# Patient Record
Sex: MALE | Race: BLACK OR AFRICAN AMERICAN | Employment: UNEMPLOYED | URBAN - METROPOLITAN AREA
[De-identification: names, ages, dates, MRNs, and addresses within clinical notes are randomized per-mention and may not be internally consistent; named-entity substitution may affect disease eponyms.]

---

## 2017-10-13 ENCOUNTER — APPOINTMENT (OUTPATIENT)
Dept: CT IMAGING | Age: 47
End: 2017-10-13
Attending: EMERGENCY MEDICINE
Payer: MEDICAID

## 2017-10-13 ENCOUNTER — APPOINTMENT (OUTPATIENT)
Dept: GENERAL RADIOLOGY | Age: 47
End: 2017-10-13
Attending: EMERGENCY MEDICINE
Payer: MEDICAID

## 2017-10-13 ENCOUNTER — HOSPITAL ENCOUNTER (EMERGENCY)
Age: 47
Discharge: HOME | End: 2017-10-13
Attending: EMERGENCY MEDICINE
Payer: MEDICAID

## 2017-10-13 VITALS
RESPIRATION RATE: 9 BRPM | TEMPERATURE: 98.2 F | DIASTOLIC BLOOD PRESSURE: 69 MMHG | WEIGHT: 258 LBS | BODY MASS INDEX: 36.12 KG/M2 | OXYGEN SATURATION: 98 % | HEIGHT: 71 IN | SYSTOLIC BLOOD PRESSURE: 123 MMHG | HEART RATE: 66 BPM

## 2017-10-13 DIAGNOSIS — J18.9 PNEUMONIA OF RIGHT LUNG DUE TO INFECTIOUS ORGANISM, UNSPECIFIED PART OF LUNG: Primary | ICD-10-CM

## 2017-10-13 LAB
ANION GAP SERPL CALC-SCNC: 8 MMOL/L (ref 3–18)
ATRIAL RATE: 56 BPM
ATRIAL RATE: 74 BPM
BASOPHILS # BLD: 0 K/UL (ref 0–0.06)
BASOPHILS NFR BLD: 0 % (ref 0–2)
BUN SERPL-MCNC: 14 MG/DL (ref 7–18)
BUN/CREAT SERPL: 9 (ref 12–20)
CALCIUM SERPL-MCNC: 8.9 MG/DL (ref 8.5–10.1)
CALCULATED P AXIS, ECG09: 36 DEGREES
CALCULATED P AXIS, ECG09: 37 DEGREES
CALCULATED R AXIS, ECG10: -3 DEGREES
CALCULATED R AXIS, ECG10: 6 DEGREES
CALCULATED T AXIS, ECG11: 10 DEGREES
CALCULATED T AXIS, ECG11: 8 DEGREES
CHLORIDE SERPL-SCNC: 107 MMOL/L (ref 100–108)
CK MB CFR SERPL CALC: NORMAL % (ref 0–4)
CK MB CFR SERPL CALC: NORMAL % (ref 0–4)
CK MB SERPL-MCNC: <1 NG/ML (ref 5–25)
CK MB SERPL-MCNC: <1 NG/ML (ref 5–25)
CK SERPL-CCNC: 85 U/L (ref 39–308)
CK SERPL-CCNC: 99 U/L (ref 39–308)
CO2 SERPL-SCNC: 28 MMOL/L (ref 21–32)
CREAT SERPL-MCNC: 1.55 MG/DL (ref 0.6–1.3)
D DIMER PPP FEU-MCNC: 0.97 UG/ML(FEU)
DIAGNOSIS, 93000: NORMAL
DIAGNOSIS, 93000: NORMAL
DIFFERENTIAL METHOD BLD: ABNORMAL
EOSINOPHIL # BLD: 0.3 K/UL (ref 0–0.4)
EOSINOPHIL NFR BLD: 3 % (ref 0–5)
ERYTHROCYTE [DISTWIDTH] IN BLOOD BY AUTOMATED COUNT: 15.3 % (ref 11.6–14.5)
GLUCOSE SERPL-MCNC: 97 MG/DL (ref 74–99)
HCT VFR BLD AUTO: 32.9 % (ref 36–48)
HGB BLD-MCNC: 11.1 G/DL (ref 13–16)
LYMPHOCYTES # BLD: 1.8 K/UL (ref 0.9–3.6)
LYMPHOCYTES NFR BLD: 20 % (ref 21–52)
MAGNESIUM SERPL-MCNC: 1.6 MG/DL (ref 1.6–2.6)
MCH RBC QN AUTO: 27.8 PG (ref 24–34)
MCHC RBC AUTO-ENTMCNC: 33.7 G/DL (ref 31–37)
MCV RBC AUTO: 82.3 FL (ref 74–97)
MONOCYTES # BLD: 0.8 K/UL (ref 0.05–1.2)
MONOCYTES NFR BLD: 8 % (ref 3–10)
NEUTS SEG # BLD: 6.1 K/UL (ref 1.8–8)
NEUTS SEG NFR BLD: 69 % (ref 40–73)
P-R INTERVAL, ECG05: 176 MS
P-R INTERVAL, ECG05: 188 MS
PLATELET # BLD AUTO: 182 K/UL (ref 135–420)
PMV BLD AUTO: 10 FL (ref 9.2–11.8)
POTASSIUM SERPL-SCNC: 4.3 MMOL/L (ref 3.5–5.5)
Q-T INTERVAL, ECG07: 364 MS
Q-T INTERVAL, ECG07: 408 MS
QRS DURATION, ECG06: 82 MS
QRS DURATION, ECG06: 86 MS
QTC CALCULATION (BEZET), ECG08: 393 MS
QTC CALCULATION (BEZET), ECG08: 404 MS
RBC # BLD AUTO: 4 M/UL (ref 4.7–5.5)
SODIUM SERPL-SCNC: 143 MMOL/L (ref 136–145)
TROPONIN I SERPL-MCNC: <0.02 NG/ML (ref 0–0.06)
TROPONIN I SERPL-MCNC: <0.02 NG/ML (ref 0–0.06)
VENTRICULAR RATE, ECG03: 56 BPM
VENTRICULAR RATE, ECG03: 74 BPM
WBC # BLD AUTO: 9 K/UL (ref 4.6–13.2)

## 2017-10-13 PROCEDURE — 71020 XR CHEST PA LAT: CPT

## 2017-10-13 PROCEDURE — 83735 ASSAY OF MAGNESIUM: CPT | Performed by: EMERGENCY MEDICINE

## 2017-10-13 PROCEDURE — 82550 ASSAY OF CK (CPK): CPT | Performed by: EMERGENCY MEDICINE

## 2017-10-13 PROCEDURE — 93005 ELECTROCARDIOGRAM TRACING: CPT

## 2017-10-13 PROCEDURE — 74011250637 HC RX REV CODE- 250/637: Performed by: EMERGENCY MEDICINE

## 2017-10-13 PROCEDURE — 80048 BASIC METABOLIC PNL TOTAL CA: CPT | Performed by: EMERGENCY MEDICINE

## 2017-10-13 PROCEDURE — 99284 EMERGENCY DEPT VISIT MOD MDM: CPT

## 2017-10-13 PROCEDURE — 85025 COMPLETE CBC W/AUTO DIFF WBC: CPT | Performed by: EMERGENCY MEDICINE

## 2017-10-13 PROCEDURE — 74011250636 HC RX REV CODE- 250/636: Performed by: EMERGENCY MEDICINE

## 2017-10-13 PROCEDURE — 96361 HYDRATE IV INFUSION ADD-ON: CPT

## 2017-10-13 PROCEDURE — 74011636320 HC RX REV CODE- 636/320: Performed by: EMERGENCY MEDICINE

## 2017-10-13 PROCEDURE — 71275 CT ANGIOGRAPHY CHEST: CPT

## 2017-10-13 PROCEDURE — 96374 THER/PROPH/DIAG INJ IV PUSH: CPT

## 2017-10-13 PROCEDURE — 85379 FIBRIN DEGRADATION QUANT: CPT | Performed by: EMERGENCY MEDICINE

## 2017-10-13 RX ORDER — GUAIFENESIN 100 MG/5ML
162 LIQUID (ML) ORAL
Status: DISCONTINUED | OUTPATIENT
Start: 2017-10-13 | End: 2017-10-13

## 2017-10-13 RX ORDER — DOXYCYCLINE 100 MG/1
100 CAPSULE ORAL 2 TIMES DAILY
Qty: 14 CAP | Refills: 0 | Status: SHIPPED | OUTPATIENT
Start: 2017-10-13 | End: 2017-10-20

## 2017-10-13 RX ORDER — DOXYCYCLINE 100 MG/1
100 CAPSULE ORAL
Status: COMPLETED | OUTPATIENT
Start: 2017-10-13 | End: 2017-10-13

## 2017-10-13 RX ORDER — MORPHINE SULFATE 4 MG/ML
4 INJECTION, SOLUTION INTRAMUSCULAR; INTRAVENOUS
Status: COMPLETED | OUTPATIENT
Start: 2017-10-13 | End: 2017-10-13

## 2017-10-13 RX ORDER — ASPIRIN 325 MG
325 TABLET ORAL
Status: COMPLETED | OUTPATIENT
Start: 2017-10-13 | End: 2017-10-13

## 2017-10-13 RX ADMIN — MORPHINE SULFATE 4 MG: 4 INJECTION, SOLUTION INTRAMUSCULAR; INTRAVENOUS at 12:22

## 2017-10-13 RX ADMIN — SODIUM CHLORIDE 1000 ML: 900 INJECTION, SOLUTION INTRAVENOUS at 12:25

## 2017-10-13 RX ADMIN — ASPIRIN 325 MG ORAL TABLET 325 MG: 325 PILL ORAL at 12:58

## 2017-10-13 RX ADMIN — IOPAMIDOL 72 ML: 755 INJECTION, SOLUTION INTRAVENOUS at 12:08

## 2017-10-13 RX ADMIN — DOXYCYCLINE HYCLATE 100 MG: 100 CAPSULE ORAL at 15:15

## 2017-10-13 NOTE — DISCHARGE INSTRUCTIONS
IF YOU HAVE NEW OR WORSENING SYMPTOMS, TROUBLE BREATHING, HIGH FEVER, FEELING LIGHTHEADED OR PASSING OUT, OR ANY OTHER WORRYING SIGNS THEN RETURN TO THE ER RIGHT AWAY. Pneumonia: Care Instructions  Your Care Instructions    Pneumonia is an infection of the lungs. Most cases are caused by infections from bacteria or viruses. Pneumonia may be mild or very severe. If it is caused by bacteria, you will be treated with antibiotics. It may take a few weeks to a few months to recover fully from pneumonia, depending on how sick you were and whether your overall health is good. Follow-up care is a key part of your treatment and safety. Be sure to make and go to all appointments, and call your doctor if you are having problems. Its also a good idea to know your test results and keep a list of the medicines you take. How can you care for yourself at home? · Take your antibiotics exactly as directed. Do not stop taking the medicine just because you are feeling better. You need to take the full course of antibiotics. · Take your medicines exactly as prescribed. Call your doctor if you think you are having a problem with your medicine. · Get plenty of rest and sleep. You may feel weak and tired for a while, but your energy level will improve with time. · To prevent dehydration, drink plenty of fluids, enough so that your urine is light yellow or clear like water. Choose water and other caffeine-free clear liquids until you feel better. If you have kidney, heart, or liver disease and have to limit fluids, talk with your doctor before you increase the amount of fluids you drink. · Take care of your cough so you can rest. A cough that brings up mucus from your lungs is common with pneumonia. It is one way your body gets rid of the infection. But if coughing keeps you from resting or causes severe fatigue and chest-wall pain, talk to your doctor. He or she may suggest that you take a medicine to reduce the cough.   · Use a vaporizer or humidifier to add moisture to your bedroom. Follow the directions for cleaning the machine. · Do not smoke or allow others to smoke around you. Smoke will make your cough last longer. If you need help quitting, talk to your doctor about stop-smoking programs and medicines. These can increase your chances of quitting for good. · Take an over-the-counter pain medicine, such as acetaminophen (Tylenol), ibuprofen (Advil, Motrin), or naproxen (Aleve). Read and follow all instructions on the label. · Do not take two or more pain medicines at the same time unless the doctor told you to. Many pain medicines have acetaminophen, which is Tylenol. Too much acetaminophen (Tylenol) can be harmful. · If you were given a spirometer to measure how well your lungs are working, use it as instructed. This can help your doctor tell how your recovery is going. · To prevent pneumonia in the future, talk to your doctor about getting a flu vaccine (once a year) and a pneumococcal vaccine (one time only for most people). When should you call for help? Call 911 anytime you think you may need emergency care. For example, call if:  · You have severe trouble breathing. Call your doctor now or seek immediate medical care if:  · You cough up dark brown or bloody mucus (sputum). · You have new or worse trouble breathing. · You are dizzy or lightheaded, or you feel like you may faint. Watch closely for changes in your health, and be sure to contact your doctor if:  · You have a new or higher fever. · You are coughing more deeply or more often. · You are not getting better after 2 days (48 hours). · You do not get better as expected. Where can you learn more? Go to http://martha-margaret.info/. Enter [id number] in the search box to learn more about \"Pneumonia: Care Instructions. \"  Current as of: March 25, 2017  Content Version: 11.3  © 8927-6314 [employer].  Care instructions adapted under license by [address] (which disclaims liability or warranty for this information). If you have questions about a medical condition or this instruction, always ask your healthcare professional. [address] any warranty or liability for your use of this information. Chest Pain: Care Instructions  Your Care Instructions  There are many things that can cause chest pain. Some are not serious and will get better on their own in a few days. But some kinds of chest pain need more testing and treatment. Your doctor may have recommended a follow-up visit in the next 8 to 12 hours. If you are not getting better, you may need more tests or treatment. Even though your doctor has released you, you still need to watch for any problems. The doctor carefully checked you, but sometimes problems can develop later. If you have new symptoms or if your symptoms do not get better, get medical care right away. If you have worse or different chest pain or pressure that lasts more than 5 minutes or you passed out (lost consciousness), call 911 or seek other emergency help right away. A medical visit is only one step in your treatment. Even if you feel better, you still need to do what your doctor recommends, such as going to all suggested follow-up appointments and taking medicines exactly as directed. This will help you recover and help prevent future problems. How can you care for yourself at home? · Rest until you feel better. · Take your medicine exactly as prescribed. Call your doctor if you think you are having a problem with your medicine. · Do not drive after taking a prescription pain medicine. When should you call for help? Call 911 if:  · You passed out (lost consciousness). · You have severe difficulty breathing. · You have symptoms of a heart attack. These may include:  ¨ Chest pain or pressure, or a strange feeling in your chest.  ¨ Sweating. ¨ Shortness of breath.   ¨ Nausea or vomiting. ¨ Pain, pressure, or a strange feeling in your back, neck, jaw, or upper belly or in one or both shoulders or arms. ¨ Lightheadedness or sudden weakness. ¨ A fast or irregular heartbeat. After you call 911, the  may tell you to chew 1 adult-strength or 2 to 4 low-dose aspirin. Wait for an ambulance. Do not try to drive yourself. Call your doctor today if:  · You have any trouble breathing. · Your chest pain gets worse. · You are dizzy or lightheaded, or you feel like you may faint. · You are not getting better as expected. · You are having new or different chest pain. Where can you learn more? Go to http://martha-margaret.info/. Enter A120 in the search box to learn more about \"Chest Pain: Care Instructions. \"  Current as of: March 20, 2017  Content Version: 11.3  © 6160-0152 [employer]. Care instructions adapted under license by [employer] (which disclaims liability or warranty for this information). If you have questions about a medical condition or this instruction, always ask your healthcare professional. [address] any warranty or liability for your use of this information.

## 2017-10-13 NOTE — ED PROVIDER NOTES
HPI Comments: 11:10 AM Kristina Grimm is a 52 y.o. male with PMHx of GERD presenting to the ED c/o constant left chest pain and shortness of breath for the past 5 days. Describes chest pain as stabbing and states pain radiates to back and to the center of his chest.  He endorses a nonproductive cough, but denies fever or sputum. States he believed his symptoms were due to his PMHx of GERD but he took his medication with no relief. Notes symptoms worsen with eating, drinking, lying down, and walking. States he recently traveled to [place]. States he had an appointment to see his PCP 3 days ago but was rescheduled to today. [facility] he called his PCP and requested to come in earlier and described his symptoms and PCP told him to go to the ED. Reports family history of diabetes. Denies fever, PMHx of HTN or diabetes, and any other symptoms or complaints. Patient is a 52 y.o. male presenting with chest pain and shortness of breath. Chest Pain (Angina)    Associated symptoms include shortness of breath. Pertinent negatives include no abdominal pain, no back pain, no cough, no dizziness, no fever, no headaches, no nausea, no palpitations, no vomiting and no weakness. Shortness of Breath   Associated symptoms include chest pain (left). Pertinent negatives include no fever, no headaches, no rhinorrhea, no sore throat, no cough, no wheezing, no vomiting, no abdominal pain, no rash and no leg swelling. Past Medical History:   Diagnosis Date    [facility] ([facility] [address]     GERD (gastroesophageal reflux disease)     Schizophrenia ([facility] [address]     Suicidal behavior        History reviewed. No pertinent surgical history. History reviewed. No pertinent family history. Social History     Social History    Marital status: UNKNOWN     Spouse name: N/A    Number of children: N/A    Years of education: N/A     Occupational History    Not on file.      Social History Main Topics    Smoking status: Current Every Day Smoker    Smokeless tobacco: Not on file    Alcohol use No    Drug use: No    Sexual activity: Not on file     Other Topics Concern    Not on file     Social History Narrative         ALLERGIES: Onion    Review of Systems   Constitutional: Negative for chills, fatigue and fever. HENT: Negative for congestion, rhinorrhea and sore throat. Eyes: Negative for pain, redness, itching and visual disturbance. Respiratory: Positive for shortness of breath. Negative for cough, chest tightness and wheezing. Cardiovascular: Positive for chest pain (left). Negative for palpitations and leg swelling. Gastrointestinal: Negative for abdominal pain, diarrhea, nausea and vomiting. Genitourinary: Negative for decreased urine volume, dysuria, flank pain and urgency. Musculoskeletal: Negative for arthralgias, back pain, gait problem and myalgias. Skin: Negative for color change, rash and wound. Allergic/Immunologic: Negative for environmental allergies, food allergies and immunocompromised state. Neurological: Negative for dizziness, weakness and headaches. All other systems reviewed and are negative. Vitals:    10/13/17 1207 10/13/17 1212 10/13/17 1214 10/13/17 1215   BP:  110/63  123/69   Pulse: 72  67 66   Resp: 18  12 9   Temp:       SpO2: 97%  97% 98%   Weight:       Height:                Physical Exam   Constitutional: He appears well-developed and well-nourished. No distress. HENT:   Head: Normocephalic and atraumatic. Mouth/Throat: Oropharynx is clear and moist.   Eyes: Conjunctivae and EOM are normal. Pupils are equal, round, and reactive to light. Neck: Normal range of motion. Neck supple. Cardiovascular: Normal rate, regular rhythm and normal heart sounds. No murmur heard. Pulmonary/Chest: Effort normal and breath sounds normal. He has no wheezes. He has no rales. Abdominal: Soft. Bowel sounds are normal. He exhibits no distension. There is no tenderness.    Musculoskeletal: Normal range of motion. He exhibits no edema or deformity. Lymphadenopathy:     He has no cervical adenopathy. Neurological: He is alert. He exhibits normal muscle tone. Coordination normal.   Skin: Skin is warm and dry. No rash noted. No erythema. Psychiatric: He has a normal mood and affect. His behavior is normal.   Nursing note and vitals reviewed.        [facility]  ED Course       Procedures      Vitals:  Patient Vitals for the past 12 hrs:   Temp Pulse Resp BP SpO2   10/13/17 1215 - 66 9 123/69 98 %   10/13/17 1214 - 67 12 - 97 %   10/13/17 1212 - - - 110/63 -   10/13/17 1207 - 72 18 - 97 %   10/13/17 1206 - 71 16 - 97 %   10/13/17 1205 - 72 18 - 97 %   10/13/17 1204 - 76 21 - 97 %   10/13/17 1203 - 77 20 - 97 %   10/13/17 1100 98.2 °F (36.8 °C) 80 19 140/80 97 %       Medications Ordered:  Medications   morphine injection 4 mg (4 mg IntraVENous Given 10/13/17 1222)   sodium chloride 0.9 % bolus infusion 1,000 mL (1,000 mL IntraVENous New Bag 10/13/17 1225)   iopamidol (ISOVUE-370) 76 % injection  mL (72 mL IntraVENous Given 10/13/17 1208)   aspirin (ASPIRIN) tablet 325 mg (325 mg Oral Given 10/13/17 1258)       Lab Findings:  Recent Results (from the past 12 hour(s))   EKG, 12 LEAD, INITIAL    Collection Time: 10/13/17 11:05 AM   Result Value Ref Range    Ventricular Rate 74 BPM    Atrial Rate 74 BPM    P-R Interval 176 ms    QRS Duration 82 ms    Q-T Interval 364 ms    QTC Calculation (Bezet) 404 ms    Calculated P Axis 36 degrees    Calculated R Axis -3 degrees    Calculated T Axis 8 degrees    Diagnosis       Normal sinus rhythm  Cannot rule out Anterior infarct (cited on or before 25-DEC-2016)  Abnormal ECG  When compared with ECG of 25-DEC-2016 21:03,  T wave inversion less evident in Inferior leads  Nonspecific T wave abnormality no longer evident in Lateral leads     METABOLIC PANEL, BASIC    Collection Time: 10/13/17 11:20 AM   Result Value Ref Range    Sodium 143 136 - 145 mmol/L    Potassium 4.3 3.5 - 5.5 mmol/L    Chloride 107 100 - 108 mmol/L    CO2 28 21 - 32 mmol/L    Anion gap 8 3.0 - 18 mmol/L    Glucose 97 74 - 99 mg/dL    BUN 14 7.0 - 18 MG/DL    Creatinine 1.55 (H) 0.6 - 1.3 MG/DL    BUN/Creatinine ratio 9 (L) 12 - 20      GFR est AA 59 (L) >60 ml/min/1.73m2    GFR est non-AA 48 (L) >60 ml/min/1.73m2    Calcium 8.9 8.5 - 10.1 MG/DL   MAGNESIUM    Collection Time: 10/13/17 11:20 AM   Result Value Ref Range    Magnesium 1.6 1.6 - 2.6 mg/dL   CARDIAC PANEL,(CK, CKMB & TROPONIN)    Collection Time: 10/13/17 11:20 AM   Result Value Ref Range    CK 99 39 - 308 U/L    CK - MB <1.0 <3.6 ng/ml    CK-MB Index  0.0 - 4.0 %     CALCULATION NOT PERFORMED WHEN RESULT IS BELOW LINEAR LIMIT    Troponin-I, Qt. <0.02 0.00 - 0.06 NG/ML   D DIMER    Collection Time: 10/13/17 11:20 AM   Result Value Ref Range    D DIMER 0.97 (H) <0.46 ug/ml(FEU)       EKG Interpretation by ED physician:    (11:05) NSR 74, no acute ST changes, TWI in lead III, similar to previous from 12/2016 and 07/2016    (14:27) Sinus bradycardia 54, no acute ST changes, TWI in lead III, unchanged from initial study    X-ray, CT or radiology findings or impressions:  XR CHEST PA LAT   Final Result   IMPRESSION:     No acute diagnostic abnormality. CTA CHEST W OR W WO CONT    IMPRESSION:     Negative for pulmonary emboli. Danny Spies Patchy groundglass infiltrate in the posterior segment right upper lobe. Probable pneumonia; pulmonary hemorrhage and edema would be the primary  differential considerations       Progress notes, consult notes, or additional procedure notes:    3:18 PM HEART Score 3, cardiac enzymes neg x 2 sets with ongoing sx. EKG unchanged from prior. CTA shows likely pneumonia, no other acute pathology. No evidence of ACS/PE/dissection. Oxygenating well, no sepsis, no significant medical comorbidities. Will treat for PNA, instructed to f/u with primary care, return if symptoms worsen or progress. Diagnosis:   1.  Pneumonia of right lung due to infectious organism, unspecified part of lung        Disposition: Discharge    Follow-up Information     None           Patient's Medications   Start Taking    No medications on file   Continue Taking    CITALOPRAM HYDROBROMIDE (CELEXA PO)    Take  by mouth. DIVALPROEX ER (DEPAKOTE ER) 250 MG ER TABLET    Take 3 Tabs by mouth nightly. Indications: MIXED BIPOLAR I DISORDER    DOXAZOSIN (CARDURA) 1 MG TABLET    Take 1 mg by mouth nightly. GABAPENTIN (NEURONTIN) 400 MG CAPSULE    Take 400 mg by mouth two (2) times a day. HYDROXYZINE PAMOATE (VISTARIL) 50 MG CAPSULE    Take 50 mg by mouth two (2) times daily as needed for Itching. OMEPRAZOLE DELAYED RELEASE (PRILOSEC D/R) 20 MG TABLET    1 tab daily to BID    PALIPERIDONE (INVEGA) 6 MG SR TABLET    Take 1 Tab by mouth daily. POLYETHYLENE GLYCOL (MIRALAX) 17 GRAM PACKET    Take 1 Packet by mouth daily. TRAZODONE (DESYREL) 50 MG TABLET    Take 1 Tab by mouth nightly. Indications: Insomnia   These Medications have changed    No medications on file   Stop Taking    No medications on file       Scribe Attestation     Crystal Ruperto acting as a scribe for and in the presence of Lawyer Pily MD      October 13, 2017 at 1:35 PM       Provider Attestation:      I personally performed the services described in the documentation, reviewed the documentation, as recorded by the scribe in my presence, and it accurately and completely records my words and actions.  October 13, 2017 at 1:35 PM - Lawyer Pily MD

## 2017-10-13 NOTE — ED NOTES
Pt presents to the ED with left chest pain onset x1 week. Pt reports \"feels like my chest is going to explode. \" Pt states pain constant and radiating to the back. Pt states feeling SOB.

## 2017-11-01 ENCOUNTER — OFFICE VISIT (OUTPATIENT)
Dept: PULMONOLOGY | Age: 47
End: 2017-11-01

## 2017-11-01 VITALS
WEIGHT: 268 LBS | BODY MASS INDEX: 37.52 KG/M2 | SYSTOLIC BLOOD PRESSURE: 120 MMHG | RESPIRATION RATE: 18 BRPM | DIASTOLIC BLOOD PRESSURE: 70 MMHG | OXYGEN SATURATION: 97 % | HEART RATE: 96 BPM | TEMPERATURE: 98.4 F | HEIGHT: 71 IN

## 2017-11-01 DIAGNOSIS — R93.89 ABNORMAL CT OF THE CHEST: Primary | ICD-10-CM

## 2017-11-01 DIAGNOSIS — J41.8 MIXED SIMPLE AND MUCOPURULENT CHRONIC BRONCHITIS (HCC): ICD-10-CM

## 2017-11-01 DIAGNOSIS — F31.9 BIPOLAR 1 DISORDER (HCC): ICD-10-CM

## 2017-11-01 DIAGNOSIS — R06.02 SOB (SHORTNESS OF BREATH) ON EXERTION: ICD-10-CM

## 2017-11-01 PROBLEM — Z72.0 BRONCHITIS DUE TO TOBACCO USE: Status: ACTIVE | Noted: 2017-11-01

## 2017-11-01 PROBLEM — R91.8 ABNORMAL CT SCAN OF LUNG: Status: ACTIVE | Noted: 2017-11-01

## 2017-11-01 PROBLEM — J40 BRONCHITIS DUE TO TOBACCO USE: Status: ACTIVE | Noted: 2017-11-01

## 2017-11-01 NOTE — LETTER
2017 4:22 PM 
 
Patient:  Maya Marcus YOB: 1970 Date of Visit: 2017 Dear Gabriella Buenrostro MD 
[address] [place] [address] [address] [address] VIA Facsimile: [phone] 
 : Thank you for referring Mr. Zack Ocasio to me for evaluation/treatment. Below are the relevant portions of my assessment and plan of care. [facility] Pulmonary, Critical Care, and Sleep Medicine Pulmonary Office Initial Consultation Name: Maya Marcus : 1970 Date: 2017 Subjective:  
Patient has been referred for evaluation of:  RUL pneumonia. Patient is a 52 y.o. male  with PMHx of GERD presenting to the ED on 10/13/2017 with c/o constant left chest pain and shortness of breath for the past 5 days. Describes chest pain as stabbing and states pain radiates to back and to the center of his chest.  He endorses a nonproductive cough, but denies fever or sputum. States he believed his symptoms were due to his PMHx of GERD but he took his medication with no relief. Notes symptoms worsen with eating, drinking, lying down, and walking. States he recently traveled to [place]. A CTA done to evaluate for PE- no PE but noted to have RUL infiltrate. Was diagnosed with Pneumonia- based on CTA and discharged on doxycycline which he completed. Feels better overall. SOB:  
Symptoms occur at rest and exertion. Able to walk on flat ground but cannot climb stairs. Activities of daily living are not affected Denies orthopnea/ paroxysmal nocturnal dyspnea SOB relieved with rest and now improved after treatment Cough: 
Has been present since 10/13/2017 Cough is described as productive of mucus. Mucus- white to green. Does not have hemoptysis. Cough occurs daily No c/o Associated wheezing, chest pain, fever, chills, night sweats dyspepsia, reflux. Treatment so far - doxycycline x10 days Imaging studies- Ct scan chest CTA Smoker- 3 packs/week Occupational exposure-none. Disabled. Past Medical History:  
Diagnosis Date  Bipolar disorder ([facility] [address]  GERD (gastroesophageal reflux disease)  Schizophrenia ([facility]  Suicidal behavior History reviewed. No pertinent surgical history. Social History Social History  Marital status: UNKNOWN Spouse name: N/A  
 Number of children: N/A  
 Years of education: N/A Social History Main Topics  Smoking status: Current Every Day Smoker  Smokeless tobacco: None  Alcohol use No  
 Drug use: No  
 Sexual activity: Not Asked Other Topics Concern  None Social History Narrative History reviewed. No pertinent family history. Allergies Allergen Reactions  Onion Rash Current Outpatient Prescriptions Medication Sig Dispense Refill  divalproex ER (DEPAKOTE ER) 250 mg ER tablet Take 3 Tabs by mouth nightly. Indications: MIXED BIPOLAR I DISORDER 90 Tab 1  
 paliperidone (INVEGA) 6 mg SR tablet Take 1 Tab by mouth daily. 30 Tab 1  
 traZODone (DESYREL) 50 mg tablet Take 1 Tab by mouth nightly. Indications: Insomnia 30 Tab 0  
 gabapentin (NEURONTIN) 400 mg capsule Take 400 mg by mouth two (2) times a day.  hydrOXYzine pamoate (VISTARIL) 50 mg capsule Take 50 mg by mouth two (2) times daily as needed for Itching.  CITALOPRAM HYDROBROMIDE (CELEXA PO) Take  by mouth.  Omeprazole delayed release (PRILOSEC D/R) 20 mg tablet 1 tab daily to BID 60 Tab 1  polyethylene glycol (MIRALAX) 17 gram packet Take 1 Packet by mouth daily. 30 Packet 1  
 doxazosin (CARDURA) 1 mg tablet Take 1 mg by mouth nightly. Review of Systems: 
HEENT: No epistaxis, no nasal drainage, no difficulty in swallowing, no redness in eyes Respiratory: as above Cardiovascular: no chest pain, no palpitations, no chronic leg edema, no syncope Gastrointestinal: no abd pain, no vomiting, no diarrhea, no bleeding symptoms Genitourinary: No urinary symptoms or hematuria Integument/breast: No ulcers or rashes Musculoskeletal:Neg 
Neurological: No focal weakness, no seizures, no headaches Behvioral/Psych: No anxiety, no depression Constitutional: No fever, no chills, no weight loss, no night sweats Objective:  
 
Visit Vitals  /70 (BP 1 Location: Left arm, BP Patient Position: Sitting)  Pulse 96  Temp 98.4 °F (36.9 °C) (Oral)  Resp 18  Ht 5' 11\" (1.803 m)  Wt 121.6 kg (268 lb)  SpO2 97% Comment: RA Rest  
 BMI 37.38 kg/m2 Physical Exam:  
General: comfortable, no acute distress HEENT: pupils reactive, sclera anicteric, EOM intact Neck: No adenopathy or thyroid swelling, no lymphadenopathy or JVD, supple CVS: S1S2 no murmurs RS: Mod AE bilaterally, no tactile fremitus or egophony, no accessory muscle use Abd: soft, non tender, no hepatosplenomegaly Neuro: non focal, awake, alert Extrm: no leg edema, clubbing or cyanosis Skin: no rash Data review:  
Pertinent labs: CBC, BMP, LFT's 
 
Imaging: 
I have personally reviewed the patients radiographs and have reviewed the reports: CTA chest 10/13/2017 The lungs show patchy infiltrate in the posterior right upper lobe. The lungs are otherwise clear. Pulmonary vascular opacification is satisfactory. There are no pulmonary emboli. There is no aneurysm or dissection of the thoracic aorta. Julieta Rashaad No pleural fluid is evident. Julieta Rashaad There is no mediastinal mass or lymphadenopathy. 
  
IMPRESSION: 
Negative for pulmonary emboli. Julieta Rashaad Patchy groundglass infiltrate in the posterior segment right upper lobe. Probable pneumonia; pulmonary hemorrhage and edema would be the primary 
differential considerations CXR Results  (Last 48 hours) None CT Results  (Last 48 hours) None Julieta Rashaad Patient Active Problem List  
Diagnosis Code  Debility R53.81  Bipolar 1 disorder (HCC) F31.9 IMPRESSION:  
 RUL infiltrate -Patchy groundglass infiltrate in the posterior segment right upper lobe. Probable pneumonia; had some nausea ? Aspiration. less likely pulmonary hemorrhage. Clinically still with bronchitis symptoms Acute on chronic bronchitis Current cigarette smoker- committed to quit after discussion Bipolar disorder RECOMMENDATIONS:  
· Continue with airway clearance measures- no indication for additional antibiotics · No indication for bronchodilators · Will check PFT's · Pneumovax- declined by patient Smoking cessation-The patient was counseled on the dangers of tobacco use, and was advised to quit. Reviewed strategies to maximize success, including removing cigarettes and smoking materials from environment, stress management, substitution of other forms of reinforcement, support of family/friends and written materials Will follow up in 3-4 months with PFT Discussed questions concerns with patient and mother Health maintenance screens deferred to Primary care provider. Mulugeta Walton MD 
 
 
 
 
 
If you have questions, please do not hesitate to call me. I look forward to following Mr. Sujata Cota along with you.  
 
 
 
Sincerely, 
 
 
Mulugeta Walton MD

## 2017-11-01 NOTE — PROGRESS NOTES
[facility]  Pulmonary, Critical Care, and Sleep Medicine      Pulmonary Office Initial Consultation    Name: Bria Montero     : 1970     Date: 2017        Subjective:   Patient has been referred for evaluation of:  RUL pneumonia. Patient is a 52 y.o. male  with PMHx of GERD presenting to the ED on 10/13/2017 with c/o constant left chest pain and shortness of breath for the past 5 days. Describes chest pain as stabbing and states pain radiates to back and to the center of his chest.  He endorses a nonproductive cough, but denies fever or sputum. States he believed his symptoms were due to his PMHx of GERD but he took his medication with no relief. Notes symptoms worsen with eating, drinking, lying down, and walking. States he recently traveled to [place]. A CTA done to evaluate for PE- no PE but noted to have RUL infiltrate. Was diagnosed with Pneumonia- based on CTA and discharged on doxycycline which he completed. Feels better overall. SOB:   Symptoms occur at rest and exertion. Able to walk on flat ground but cannot climb stairs. Activities of daily living are not affected  Denies orthopnea/ paroxysmal nocturnal dyspnea  SOB relieved with rest and now improved after treatment    Cough:  Has been present since 10/13/2017  Cough is described as productive of mucus. Mucus- white to green. Does not have hemoptysis. Cough occurs daily    No c/o Associated wheezing, chest pain, fever, chills, night sweats dyspepsia, reflux. Treatment so far - doxycycline x10 days  Imaging studies- Ct scan chest CTA   Smoker- 3 packs/week  Occupational exposure-none. Disabled. Past Medical History:   Diagnosis Date    B[facility] disorder ([facility]     GERD (gastroesophageal reflux disease)     Schizophrenia ([facility]     Suicidal behavior      History reviewed. No pertinent surgical history.     Social History     Social History    Marital status: UNKNOWN     Spouse name: N/A    Number of children: N/A    Years of education: N/A     Social History Main Topics    Smoking status: Current Every Day Smoker    Smokeless tobacco: None    Alcohol use No    Drug use: No    Sexual activity: Not Asked     Other Topics Concern    None     Social History Narrative       History reviewed. No pertinent family history. Allergies   Allergen Reactions    Onion Rash     Current Outpatient Prescriptions   Medication Sig Dispense Refill    divalproex ER (DEPAKOTE ER) 250 mg ER tablet Take 3 Tabs by mouth nightly. Indications: MIXED BIPOLAR I DISORDER 90 Tab 1    paliperidone (INVEGA) 6 mg SR tablet Take 1 Tab by mouth daily. 30 Tab 1    traZODone (DESYREL) 50 mg tablet Take 1 Tab by mouth nightly. Indications: Insomnia 30 Tab 0    gabapentin (NEURONTIN) 400 mg capsule Take 400 mg by mouth two (2) times a day.  hydrOXYzine pamoate (VISTARIL) 50 mg capsule Take 50 mg by mouth two (2) times daily as needed for Itching.  CITALOPRAM HYDROBROMIDE (CELEXA PO) Take  by mouth.  Omeprazole delayed release (PRILOSEC D/R) 20 mg tablet 1 tab daily to BID 60 Tab 1    polyethylene glycol (MIRALAX) 17 gram packet Take 1 Packet by mouth daily. 30 Packet 1    doxazosin (CARDURA) 1 mg tablet Take 1 mg by mouth nightly.            Review of Systems:  HEENT: No epistaxis, no nasal drainage, no difficulty in swallowing, no redness in eyes  Respiratory: as above  Cardiovascular: no chest pain, no palpitations, no chronic leg edema, no syncope  Gastrointestinal: no abd pain, no vomiting, no diarrhea, no bleeding symptoms  Genitourinary: No urinary symptoms or hematuria  Integument/breast: No ulcers or rashes  Musculoskeletal:Neg  Neurological: No focal weakness, no seizures, no headaches  Behvioral/Psych: No anxiety, no depression  Constitutional: No fever, no chills, no weight loss, no night sweats     Objective:     Visit Vitals    /70 (BP 1 Location: Left arm, BP Patient Position: Sitting)    Pulse 96  Temp 98.4 °F (36.9 °C) (Oral)    Resp 18    Ht 5' 11\" (1.803 m)    Wt 121.6 kg (268 lb)    SpO2 97%  Comment: RA Rest    BMI 37.38 kg/m2        Physical Exam:   General: comfortable, no acute distress  HEENT: pupils reactive, sclera anicteric, EOM intact  Neck: No adenopathy or thyroid swelling, no lymphadenopathy or JVD, supple  CVS: S1S2 no murmurs  RS: Mod AE bilaterally, no tactile fremitus or egophony, no accessory muscle use  Abd: soft, non tender, no hepatosplenomegaly  Neuro: non focal, awake, alert  Extrm: no leg edema, clubbing or cyanosis  Skin: no rash    Data review:   Pertinent labs: CBC, BMP, LFT's    Imaging:  I have personally reviewed the patients radiographs and have reviewed the reports:    CTA chest 10/13/2017  The lungs show patchy infiltrate in the posterior right upper lobe. The lungs are otherwise clear. Pulmonary vascular opacification is satisfactory. There are no pulmonary emboli. There is no aneurysm or dissection of the thoracic aorta. .  No pleural fluid is evident. .  There is no mediastinal mass or lymphadenopathy.     IMPRESSION:  Negative for pulmonary emboli. [place] Rm Patchy groundglass infiltrate in the posterior segment right upper lobe. Probable pneumonia; pulmonary hemorrhage and edema would be the primary  differential considerations    CXR Results  (Last 48 hours)    None        CT Results  (Last 48 hours)    None        . Patient Active Problem List   Diagnosis Code    Debility R53.81    Bipolar 1 disorder ([facility] F31.9     IMPRESSION:   RUL infiltrate -Patchy groundglass infiltrate in the posterior segment right upper lobe. Probable pneumonia; had some nausea ? Aspiration. less likely pulmonary hemorrhage.  Clinically still with bronchitis symptoms  Acute on chronic bronchitis  Current cigarette smoker- committed to quit after discussion  Bipolar disorder      RECOMMENDATIONS:   · Continue with airway clearance measures- no indication for additional antibiotics  · No indication for bronchodilators  · Will check PFT's  · Pneumovax- declined by patient  Smoking cessation-The patient was counseled on the dangers of tobacco use, and was advised to quit. Reviewed strategies to maximize success, including removing cigarettes and smoking materials from environment, stress management, substitution of other forms of reinforcement, support of family/friends and written materials   Will follow up in 3-4 months with PFT  Discussed questions concerns with patient and mother     Health maintenance screens deferred to Primary care provider.      Lawanda Snow MD

## 2017-11-01 NOTE — PATIENT INSTRUCTIONS

## 2017-11-01 NOTE — COMMUNICATION BODY
[facility]  Pulmonary, Critical Care, and Sleep Medicine      Pulmonary Office Initial Consultation    Name: Bashir Murphy     : 1970     Date: 2017        Subjective:   Patient has been referred for evaluation of:  RUL pneumonia. Patient is a 52 y.o. male  with PMHx of GERD presenting to the ED on 10/13/2017 with c/o constant left chest pain and shortness of breath for the past 5 days. Describes chest pain as stabbing and states pain radiates to back and to the center of his chest.  He endorses a nonproductive cough, but denies fever or sputum. States he believed his symptoms were due to his PMHx of GERD but he took his medication with no relief. Notes symptoms worsen with eating, drinking, lying down, and walking. States he recently traveled to [place]. A CTA done to evaluate for PE- no PE but noted to have RUL infiltrate. Was diagnosed with Pneumonia- based on CTA and discharged on doxycycline which he completed. Feels better overall. SOB:   Symptoms occur at rest and exertion. Able to walk on flat ground but cannot climb stairs. Activities of daily living are not affected  Denies orthopnea/ paroxysmal nocturnal dyspnea  SOB relieved with rest and now improved after treatment    Cough:  Has been present since 10/13/2017  Cough is described as productive of mucus. Mucus- white to green. Does not have hemoptysis. Cough occurs daily    No c/o Associated wheezing, chest pain, fever, chills, night sweats dyspepsia, reflux. Treatment so far - doxycycline x10 days  Imaging studies- Ct scan chest CTA   Smoker- 3 packs/week  Occupational exposure-none. Disabled. Past Medical History:   Diagnosis Date    B[facility] ([facility]     GERD (gastroesophageal reflux disease)     Schizophrenia ([facility]     Suicidal behavior      History reviewed. No pertinent surgical history.     Social History     Social History    Marital status: UNKNOWN     Spouse name: N/A    Number of children: N/A    Years of education: N/A     Social History Main Topics    Smoking status: Current Every Day Smoker    Smokeless tobacco: None    Alcohol use No    Drug use: No    Sexual activity: Not Asked     Other Topics Concern    None     Social History Narrative       History reviewed. No pertinent family history. Allergies   Allergen Reactions    Onion Rash     Current Outpatient Prescriptions   Medication Sig Dispense Refill    divalproex ER (DEPAKOTE ER) 250 mg ER tablet Take 3 Tabs by mouth nightly. Indications: MIXED BIPOLAR I DISORDER 90 Tab 1    paliperidone (INVEGA) 6 mg SR tablet Take 1 Tab by mouth daily. 30 Tab 1    traZODone (DESYREL) 50 mg tablet Take 1 Tab by mouth nightly. Indications: Insomnia 30 Tab 0    gabapentin (NEURONTIN) 400 mg capsule Take 400 mg by mouth two (2) times a day.  hydrOXYzine pamoate (VISTARIL) 50 mg capsule Take 50 mg by mouth two (2) times daily as needed for Itching.  CITALOPRAM HYDROBROMIDE (CELEXA PO) Take  by mouth.  Omeprazole delayed release (PRILOSEC D/R) 20 mg tablet 1 tab daily to BID 60 Tab 1    polyethylene glycol (MIRALAX) 17 gram packet Take 1 Packet by mouth daily. 30 Packet 1    doxazosin (CARDURA) 1 mg tablet Take 1 mg by mouth nightly.            Review of Systems:  HEENT: No epistaxis, no nasal drainage, no difficulty in swallowing, no redness in eyes  Respiratory: as above  Cardiovascular: no chest pain, no palpitations, no chronic leg edema, no syncope  Gastrointestinal: no abd pain, no vomiting, no diarrhea, no bleeding symptoms  Genitourinary: No urinary symptoms or hematuria  Integument/breast: No ulcers or rashes  Musculoskeletal:Neg  Neurological: No focal weakness, no seizures, no headaches  Behvioral/Psych: No anxiety, no depression  Constitutional: No fever, no chills, no weight loss, no night sweats     Objective:     Visit Vitals    /70 (BP 1 Location: Left arm, BP Patient Position: Sitting)    Pulse 96  Temp 98.4 °F (36.9 °C) (Oral)    Resp 18    Ht 5' 11\" (1.803 m)    Wt 121.6 kg (268 lb)    SpO2 97%  Comment: RA Rest    BMI 37.38 kg/m2        Physical Exam:   General: comfortable, no acute distress  HEENT: pupils reactive, sclera anicteric, EOM intact  Neck: No adenopathy or thyroid swelling, no lymphadenopathy or JVD, supple  CVS: S1S2 no murmurs  RS: Mod AE bilaterally, no tactile fremitus or egophony, no accessory muscle use  Abd: soft, non tender, no hepatosplenomegaly  Neuro: non focal, awake, alert  Extrm: no leg edema, clubbing or cyanosis  Skin: no rash    Data review:   Pertinent labs: CBC, BMP, LFT's    Imaging:  I have personally reviewed the patients radiographs and have reviewed the reports:    CTA chest 10/13/2017  The lungs show patchy infiltrate in the posterior right upper lobe. The lungs are otherwise clear. Pulmonary vascular opacification is satisfactory. There are no pulmonary emboli. There is no aneurysm or dissection of the thoracic aorta. .  No pleural fluid is evident. .  There is no mediastinal mass or lymphadenopathy.     IMPRESSION:  Negative for pulmonary emboli. Peggyann Gang Patchy groundglass infiltrate in the posterior segment right upper lobe. Probable pneumonia; pulmonary hemorrhage and edema would be the primary  differential considerations    CXR Results  (Last 48 hours)    None        CT Results  (Last 48 hours)    None        . Patient Active Problem List   Diagnosis Code    Debility R53.81    Bipolar 1 disorder ([facility] F31.9     IMPRESSION:   RUL infiltrate -Patchy groundglass infiltrate in the posterior segment right upper lobe. Probable pneumonia; had some nausea ? Aspiration. less likely pulmonary hemorrhage.  Clinically still with bronchitis symptoms  Acute on chronic bronchitis  Current cigarette smoker- committed to quit after discussion  Bipolar disorder      RECOMMENDATIONS:   · Continue with airway clearance measures- no indication for additional antibiotics  · No indication for bronchodilators  · Will check PFT's  · Pneumovax- declined by patient  Smoking cessation-The patient was counseled on the dangers of tobacco use, and was advised to quit. Reviewed strategies to maximize success, including removing cigarettes and smoking materials from environment, stress management, substitution of other forms of reinforcement, support of family/friends and written materials   Will follow up in 3-4 months with PFT  Discussed questions concerns with patient and mother     Health maintenance screens deferred to Primary care provider.      Blake Wen MD

## 2017-11-01 NOTE — MR AVS SNAPSHOT
Visit Information Date & Time Provider Department Dept. Phone Encounter #  
 11/1/2017 10:00 AM Seb Sandoval MD [facility] [id number] Follow-up Instructions Return in about 3 months (around 2/1/2018). Upcoming Health Maintenance Date Due DTaP/Tdap/Td series (1 - Tdap) 5/2/1991 INFLUENZA AGE 9 TO ADULT 8/1/2017 Pneumococcal 19-64 Medium Risk (1 of 1 - PPSV23) 2/1/2018* *Topic was postponed. The date shown is not the original due date. Allergies as of 11/1/2017  Review Complete On: 11/1/2017 By: Jessica Moreno LPN Severity Noted Reaction Type Reactions Onion  12/30/2016    Rash Current Immunizations  Reviewed on 11/1/2017 Name Date Influenza Vaccine 10/1/2017 Reviewed by Jessica Moreno LPN on 75/9/3458 at 90:29 AM  
You Were Diagnosed With   
  
 Codes Comments Mixed simple and mucopurulent chronic bronchitis ([facility]    -  Primary ICD-10-CM: J41.8 ICD-9-CM: 491.1 SOB (shortness of breath) on exertion     ICD-10-CM: R06.02 
ICD-9-CM: 786.05 Bipolar 1 disorder (HCC)     ICD-10-CM: F31.9 ICD-9-CM: 296.7 Vitals BP Pulse Temp Resp Height(growth percentile) Weight(growth percentile) 120/70 (BP 1 Location: Left arm, BP Patient Position: Sitting) 96 98.4 °F (36.9 °C) (Oral) 18 5' 11\" (1.803 m) 268 lb (121.6 kg) SpO2 BMI Smoking Status 97% 37.38 kg/m2 Current Every Day Smoker BMI and BSA Data Body Mass Index Body Surface Area  
 37.38 kg/m 2 2.47 m 2 Preferred Pharmacy Pharmacy Name Phone [facility] [address] [facility] [address] [facility] [phone] Your Updated Medication List  
  
   
This list is accurate as of: 11/1/17 10:36 AM.  Always use your most recent med list.  
  
  
  
  
 CARDURA 1 mg tablet Generic drug:  doxazosin Take 1 mg by mouth nightly. CELEXA PO Take  by mouth. divalproex  mg ER tablet Commonly known as:  DEPAKOTE ER Take 3 Tabs by mouth nightly. Indications: MIXED BIPOLAR I DISORDER  
  
 gabapentin 400 mg capsule Commonly known as:  NEURONTIN Take 400 mg by mouth two (2) times a day. Omeprazole delayed release 20 mg tablet Commonly known as:  PRILOSEC D/R  
1 tab daily to BID  
  
 paliperidone 6 mg SR tablet Commonly known as:  INVEGA Take 1 Tab by mouth daily. polyethylene glycol 17 gram packet Commonly known as:  Ronne Lois Take 1 Packet by mouth daily. traZODone 50 mg tablet Commonly known as:  [place] Cower Take 1 Tab by mouth nightly. Indications: Insomnia VISTARIL 50 mg capsule Generic drug:  hydrOXYzine pamoate Take 50 mg by mouth two (2) times daily as needed for Itching. Follow-up Instructions Return in about 3 months (around 2/1/2018). To-Do List   
 11/02/2017 Procedures: AMB POC PFT COMPLETE W/BRONCHODILATOR   
  
 11/02/2017 Procedures: AMB POC PFT COMPLETE W/O BRONCHODILATOR   
  
 11/02/2017 Procedures:  DIFFUSING CAPACITY   
  
 11/02/2017 Procedures:  GAS DILUT/WASHOUT LUNG VOL W/WO DISTRIB VENT&VOL Patient Instructions Stopping Smoking: Care Instructions Your Care Instructions Cigarette smokers crave the nicotine in cigarettes. Giving it up is much harder than simply changing a habit. Your body has to stop craving the nicotine. It is hard to quit, but you can do it. There are many tools that people use to quit smoking. You may find that combining tools works best for you. There are several steps to quitting. First you get ready to quit. Then you get support to help you. After that, you learn new skills and behaviors to become a nonsmoker. For many people, a necessary step is getting and using medicine. Your doctor will help you set up the plan that best meets your needs.  You may want to attend a smoking cessation program to help you quit smoking. When you choose a program, look for one that has proven success. Ask your doctor for ideas. You will greatly increase your chances of success if you take medicine as well as get counseling or join a cessation program. 
Some of the changes you feel when you first quit tobacco are uncomfortable. Your body will miss the nicotine at first, and you may feel short-tempered and grumpy. You may have trouble sleeping or concentrating. Medicine can help you deal with these symptoms. You may struggle with changing your smoking habits and rituals. The last step is the tricky one: Be prepared for the smoking urge to continue for a time. This is a lot to deal with, but keep at it. You will feel better. Follow-up care is a key part of your treatment and safety. Be sure to make and go to all appointments, and call your doctor if you are having problems. It's also a good idea to know your test results and keep a list of the medicines you take. How can you care for yourself at home? · Ask your family, friends, and coworkers for support. You have a better chance of quitting if you have help and support. · Join a support group, such as Nicotine Anonymous, for people who are trying to quit smoking. · Consider signing up for a smoking cessation program, such as the American Lung Association's Freedom from Smoking program. 
· Set a quit date. Pick your date carefully so that it is not right in the middle of a big deadline or stressful time. Once you quit, do not even take a puff. Get rid of all ashtrays and lighters after your last cigarette. Clean your house and your clothes so that they do not smell of smoke. · Learn how to be a nonsmoker. Think about ways you can avoid those things that make you reach for a cigarette. ¨ Avoid situations that put you at greatest risk for smoking. For some people, it is hard to have a drink with friends without smoking.  For others, they might skip a coffee break with coworkers who smoke. ¨ Change your daily routine. Take a different route to work or eat a meal in a different place. · Cut down on stress. Calm yourself or release tension by doing an activity you enjoy, such as reading a book, taking a hot bath, or gardening. · Talk to your doctor or pharmacist about nicotine replacement therapy, which replaces the nicotine in your body. You still get nicotine but you do not use tobacco. Nicotine replacement products help you slowly reduce the amount of nicotine you need. These products come in several forms, many of them available over-the-counter: ¨ Nicotine patches ¨ Nicotine gum and lozenges ¨ Nicotine inhaler · Ask your doctor about bupropion (Wellbutrin) or varenicline (Chantix), which are prescription medicines. They do not contain nicotine. They help you by reducing withdrawal symptoms, such as stress and anxiety. · Some people find hypnosis, acupuncture, and massage helpful for ending the smoking habit. · Eat a healthy diet and get regular exercise. Having healthy habits will help your body move past its craving for nicotine. · Be prepared to keep trying. Most people are not successful the first few times they try to quit. Do not get mad at yourself if you smoke again. Make a list of things you learned and think about when you want to try again, such as next week, next month, or next year. Where can you learn more? Go to http://martha-margaret.info/. Enter A491 in the search box to learn more about \"Stopping Smoking: Care Instructions. \" Current as of: March 20, 2017 Content Version: 11.4 © 9009-4485 [employer]. Care instructions adapted under license by [employer] (which disclaims liability or warranty for this information).  If you have questions about a medical condition or this instruction, always ask your healthcare professional. [employer] disclaims any warranty or liability for your use of this information. Introducing [facility]! Cristobal Moreno introduces [employer] patient portal. Now you can access parts of your medical record, email your doctor's office, and request medication refills online. 1. In your internet browser, go to [email or web address] 2. Click on the First Time User? Click Here link in the Sign In box. You will see the New Member Sign Up page. 3. Enter your [employer] Access Code exactly as it appears below. You will not need to use this code after youve completed the sign-up process. If you do not sign up before the expiration date, you must request a new code. · [employer] Access Code: [email or web address] Expires: 11/26/2017  9:33 AM 
 
4. Enter the last four digits of your Social Security Number (xxxx) and Date of Birth (mm/dd/yyyy) as indicated and click Submit. You will be taken to the next sign-up page. 5. Create a [employer] ID. This will be your [employer] login ID and cannot be changed, so think of one that is secure and easy to remember. 6. Create a [employer] password. You can change your password at any time. 7. Enter your Password Reset Question and Answer. This can be used at a later time if you forget your password. 8. Enter your e-mail address. You will receive e-mail notification when new information is available in [address] 9. Click Sign Up. You can now view and download portions of your medical record. 10. Click the Download Summary menu link to download a portable copy of your medical information. If you have questions, please visit the Frequently Asked Questions section of the [employer] website. Remember, [employer] is NOT to be used for urgent needs. For medical emergencies, dial 911. Now available from your iPhone and Android! Please provide this summary of care documentation to your next provider. Your primary care clinician is listed as Saúl Almaraz. If you have any questions after today's visit, please call [phone].

## 2017-12-16 ENCOUNTER — HOSPITAL ENCOUNTER (EMERGENCY)
Age: 47
Discharge: HOME | End: 2017-12-16
Attending: EMERGENCY MEDICINE
Payer: MEDICAID

## 2017-12-16 ENCOUNTER — APPOINTMENT (OUTPATIENT)
Dept: CT IMAGING | Age: 47
End: 2017-12-16
Attending: EMERGENCY MEDICINE
Payer: MEDICAID

## 2017-12-16 VITALS
SYSTOLIC BLOOD PRESSURE: 109 MMHG | WEIGHT: 258 LBS | DIASTOLIC BLOOD PRESSURE: 54 MMHG | RESPIRATION RATE: 20 BRPM | TEMPERATURE: 97.9 F | HEART RATE: 65 BPM | BODY MASS INDEX: 36.12 KG/M2 | OXYGEN SATURATION: 95 % | HEIGHT: 71 IN

## 2017-12-16 DIAGNOSIS — K42.9 UMBILICAL HERNIA WITHOUT OBSTRUCTION AND WITHOUT GANGRENE: ICD-10-CM

## 2017-12-16 DIAGNOSIS — K40.90 UNILATERAL INGUINAL HERNIA WITHOUT OBSTRUCTION OR GANGRENE, RECURRENCE NOT SPECIFIED: ICD-10-CM

## 2017-12-16 DIAGNOSIS — K44.9 HIATAL HERNIA: Primary | ICD-10-CM

## 2017-12-16 LAB
ALBUMIN SERPL-MCNC: 3.5 G/DL (ref 3.4–5)
ALBUMIN/GLOB SERPL: 0.9 {RATIO} (ref 0.8–1.7)
ALP SERPL-CCNC: 116 U/L (ref 45–117)
ALT SERPL-CCNC: 30 U/L (ref 16–61)
ANION GAP SERPL CALC-SCNC: 8 MMOL/L (ref 3–18)
APPEARANCE UR: CLEAR
AST SERPL-CCNC: 16 U/L (ref 15–37)
BASOPHILS # BLD: 0 K/UL (ref 0–0.1)
BASOPHILS NFR BLD: 0 % (ref 0–2)
BILIRUB SERPL-MCNC: 0.3 MG/DL (ref 0.2–1)
BILIRUB UR QL: NEGATIVE
BUN SERPL-MCNC: 17 MG/DL (ref 7–18)
BUN/CREAT SERPL: 12 (ref 12–20)
CALCIUM SERPL-MCNC: 8.5 MG/DL (ref 8.5–10.1)
CHLORIDE SERPL-SCNC: 107 MMOL/L (ref 100–108)
CO2 SERPL-SCNC: 25 MMOL/L (ref 21–32)
COLOR UR: YELLOW
CREAT SERPL-MCNC: 1.41 MG/DL (ref 0.6–1.3)
DIFFERENTIAL METHOD BLD: ABNORMAL
EOSINOPHIL # BLD: 0.3 K/UL (ref 0–0.4)
EOSINOPHIL NFR BLD: 4 % (ref 0–5)
ERYTHROCYTE [DISTWIDTH] IN BLOOD BY AUTOMATED COUNT: 14.6 % (ref 11.6–14.5)
GLOBULIN SER CALC-MCNC: 3.9 G/DL (ref 2–4)
GLUCOSE SERPL-MCNC: 81 MG/DL (ref 74–99)
GLUCOSE UR STRIP.AUTO-MCNC: NEGATIVE MG/DL
HCT VFR BLD AUTO: 37.6 % (ref 36–48)
HGB BLD-MCNC: 13.1 G/DL (ref 13–16)
HGB UR QL STRIP: NEGATIVE
KETONES UR QL STRIP.AUTO: NEGATIVE MG/DL
LEUKOCYTE ESTERASE UR QL STRIP.AUTO: NEGATIVE
LIPASE SERPL-CCNC: 108 U/L (ref 73–393)
LYMPHOCYTES # BLD: 2.3 K/UL (ref 0.9–3.6)
LYMPHOCYTES NFR BLD: 30 % (ref 21–52)
MCH RBC QN AUTO: 28.3 PG (ref 24–34)
MCHC RBC AUTO-ENTMCNC: 34.8 G/DL (ref 31–37)
MCV RBC AUTO: 81.2 FL (ref 74–97)
MONOCYTES # BLD: 0.7 K/UL (ref 0.05–1.2)
MONOCYTES NFR BLD: 9 % (ref 3–10)
NEUTS SEG # BLD: 4.4 K/UL (ref 1.8–8)
NEUTS SEG NFR BLD: 57 % (ref 40–73)
NITRITE UR QL STRIP.AUTO: NEGATIVE
PH UR STRIP: 6 [PH] (ref 5–8)
PLATELET # BLD AUTO: 188 K/UL (ref 135–420)
PMV BLD AUTO: 10.6 FL (ref 9.2–11.8)
POTASSIUM SERPL-SCNC: 4 MMOL/L (ref 3.5–5.5)
PROT SERPL-MCNC: 7.4 G/DL (ref 6.4–8.2)
PROT UR STRIP-MCNC: NEGATIVE MG/DL
RBC # BLD AUTO: 4.63 M/UL (ref 4.7–5.5)
SODIUM SERPL-SCNC: 140 MMOL/L (ref 136–145)
SP GR UR REFRACTOMETRY: 1.02 (ref 1–1.03)
UROBILINOGEN UR QL STRIP.AUTO: 1 EU/DL (ref 0.2–1)
WBC # BLD AUTO: 7.7 K/UL (ref 4.6–13.2)

## 2017-12-16 PROCEDURE — 85025 COMPLETE CBC W/AUTO DIFF WBC: CPT | Performed by: EMERGENCY MEDICINE

## 2017-12-16 PROCEDURE — 99284 EMERGENCY DEPT VISIT MOD MDM: CPT

## 2017-12-16 PROCEDURE — 74011250636 HC RX REV CODE- 250/636: Performed by: EMERGENCY MEDICINE

## 2017-12-16 PROCEDURE — 74177 CT ABD & PELVIS W/CONTRAST: CPT

## 2017-12-16 PROCEDURE — 80053 COMPREHEN METABOLIC PANEL: CPT | Performed by: EMERGENCY MEDICINE

## 2017-12-16 PROCEDURE — 96374 THER/PROPH/DIAG INJ IV PUSH: CPT

## 2017-12-16 PROCEDURE — 81003 URINALYSIS AUTO W/O SCOPE: CPT | Performed by: EMERGENCY MEDICINE

## 2017-12-16 PROCEDURE — 96375 TX/PRO/DX INJ NEW DRUG ADDON: CPT

## 2017-12-16 PROCEDURE — 83690 ASSAY OF LIPASE: CPT | Performed by: EMERGENCY MEDICINE

## 2017-12-16 PROCEDURE — 74011636320 HC RX REV CODE- 636/320: Performed by: EMERGENCY MEDICINE

## 2017-12-16 RX ORDER — MORPHINE SULFATE 4 MG/ML
4 INJECTION, SOLUTION INTRAMUSCULAR; INTRAVENOUS
Status: COMPLETED | OUTPATIENT
Start: 2017-12-16 | End: 2017-12-16

## 2017-12-16 RX ORDER — OXYCODONE AND ACETAMINOPHEN 5; 325 MG/1; MG/1
1 TABLET ORAL
Qty: 12 TAB | Refills: 0 | Status: SHIPPED | OUTPATIENT
Start: 2017-12-16 | End: 2017-12-18

## 2017-12-16 RX ORDER — OXYCODONE AND ACETAMINOPHEN 5; 325 MG/1; MG/1
1 TABLET ORAL
Qty: 12 TAB | Refills: 0 | Status: SHIPPED | OUTPATIENT
Start: 2017-12-16 | End: 2017-12-16

## 2017-12-16 RX ORDER — ONDANSETRON 2 MG/ML
4 INJECTION INTRAMUSCULAR; INTRAVENOUS
Status: COMPLETED | OUTPATIENT
Start: 2017-12-16 | End: 2017-12-16

## 2017-12-16 RX ADMIN — IOPAMIDOL 100 ML: 612 INJECTION, SOLUTION INTRAVENOUS at 10:25

## 2017-12-16 RX ADMIN — ONDANSETRON 4 MG: 2 INJECTION INTRAMUSCULAR; INTRAVENOUS at 09:28

## 2017-12-16 RX ADMIN — MORPHINE SULFATE 4 MG: 4 INJECTION, SOLUTION INTRAMUSCULAR; INTRAVENOUS at 09:28

## 2017-12-16 NOTE — DISCHARGE INSTRUCTIONS
Hernia: Care Instructions  Your Care Instructions    A hernia develops when tissue bulges through a weak spot in the wall of your belly. The groin area and the navel are common areas for a hernia. A hernia can also develop near the area of a surgery you had before. Pressure from lifting, straining, or coughing can tear the weak area, causing the hernia to bulge and be painful. If you cannot push a hernia back into place, the tissue may become trapped outside the belly wall. If the hernia gets twisted and loses its blood supply, it will swell and die. This is called a strangulated hernia. It usually causes a lot of pain. It needs treatment right away. Some hernias need to be repaired to prevent a strangulated hernia. If your hernia causes symptoms or is large, you may need surgery. Follow-up care is a key part of your treatment and safety. Be sure to make and go to all appointments, and call your doctor if you are having problems. It's also a good idea to know your test results and keep a list of the medicines you take. How can you care for yourself at home? · Take care when lifting heavy objects. · Stay at a healthy weight. · Do not smoke. Smoking can cause coughing, which can cause your hernia to bulge. If you need help quitting, talk to your doctor about stop-smoking programs and medicines. These can increase your chances of quitting for good. · Talk with your doctor before wearing a corset or truss for a hernia. These devices are not recommended for treating hernias and sometimes can do more harm than good. There may be certain situations when your doctor thinks a truss would work, but these are rare. When should you call for help? Call your doctor now or seek immediate medical care if:  ? · You have new or worse belly pain. ? · You are vomiting. ? · You cannot pass stools or gas. ? · You cannot push the hernia back into place with gentle pressure when you are lying down.    ? · The area over the hernia turns red or becomes tender. ? Watch closely for changes in your health, and be sure to contact your doctor if you have any problems. Where can you learn more? Go to http://martha-margaret.info/. Enter C129 in the search box to learn more about \"Hernia: Care Instructions. \"  Current as of: May 12, 2017  Content Version: 11.4  © 3774-3041 [employer]. Care instructions adapted under license by [employer] (which disclaims liability or warranty for this information). If you have questions about a medical condition or this instruction, always ask your healthcare professional. [address] any warranty or liability for your use of this information.

## 2017-12-16 NOTE — ED NOTES
[facility] Pt currently [facility] on stretcher with no complaints. Pt updated on plan of care. Call  Tracie Villar is in reach. Will continue to monitor.

## 2017-12-16 NOTE — ED PROVIDER NOTES
EMERGENCY DEPARTMENT HISTORY AND PHYSICAL EXAM    9:34 AM      Date: 12/16/2017  Patient Name: Deven Martinez    History of Presenting Illness     Chief Complaint   Patient presents with    Abdominal Pain         History Provided By: Patient    Chief Complaint: Abdominal pain  Duration: 6  Days  Timing:  Constant  Location: Lower abdomen  Quality: moderate  Severity: N/A  Modifying Factors: Urinating or defecating increases pain  Associated Symptoms: dysuria and nausea      Additional History (Context): Deven Martinez is a 52 y.o. male with GERD who presents with constant moderate lower abdominal pain that started last Sunday morning. Pt states that he was hospitalized roughly a year ago for abdominal pain and that this pain \"feels the same\". He denied having blood in his stool and affirmed that he was experiencing nausea and as well as dysuria. The Pt noted that upon urinating or defecating his abdominal pain increased. The last bowel movement that the Pt had was this morning and normal. No other concerns or symptoms expressed by the patient at this time. PCP: Nestor Calix MD    Current Outpatient Prescriptions   Medication Sig Dispense Refill    oxyCODONE-acetaminophen (PERCOCET) 5-325 mg per tablet Take 1 Tab by mouth every four (4) hours as needed for Pain. Max Daily Amount: 6 Tabs. 12 Tab 0    divalproex ER (DEPAKOTE ER) 250 mg ER tablet Take 3 Tabs by mouth nightly. Indications: MIXED BIPOLAR I DISORDER 90 Tab 1    paliperidone (INVEGA) 6 mg SR tablet Take 1 Tab by mouth daily. 30 Tab 1    polyethylene glycol (MIRALAX) 17 gram packet Take 1 Packet by mouth daily. 30 Packet 1    traZODone (DESYREL) 50 mg tablet Take 1 Tab by mouth nightly. Indications: Insomnia 30 Tab 0    gabapentin (NEURONTIN) 400 mg capsule Take 400 mg by mouth two (2) times a day.  doxazosin (CARDURA) 1 mg tablet Take 1 mg by mouth nightly.       hydrOXYzine pamoate (VISTARIL) 50 mg capsule Take 50 mg by mouth two (2) times daily as needed for Itching.  CITALOPRAM HYDROBROMIDE (CELEXA PO) Take  by mouth.  Omeprazole delayed release (PRILOSEC D/R) 20 mg tablet 1 tab daily to BID 60 Tab 1       Past History     Past Medical History:  Past Medical History:   Diagnosis Date    Bipolar disorder ([facility]     GERD (gastroesophageal reflux disease)     Schizophrenia ([facility]     Suicidal behavior        Past Surgical History:  No past surgical history on file. Family History:  Family History   Problem Relation Age of Onset    Diabetes Mother        Social History:  Social History   Substance Use Topics    Smoking status: Current Every Day Smoker    Smokeless tobacco: Not on file    Alcohol use No       Allergies: Allergies   Allergen Reactions    Onion Rash         Review of Systems       Review of Systems   Gastrointestinal: Positive for abdominal pain and nausea. Negative for blood in stool. Genitourinary: Positive for dysuria. All other systems reviewed and are negative. Physical Exam     Visit Vitals    /67    Pulse 65    Temp 97.9 °F (36.6 °C)    Resp 20    Ht 5' 11\" (1.803 m)    Wt 117 kg (258 lb)    SpO2 94%    BMI 35.98 kg/m2         Physical Exam   Constitutional: He is oriented to person, place, and time. He appears well-developed. HENT:   Head: Normocephalic and atraumatic. Eyes: Conjunctivae and EOM are normal.   Neck: Normal range of motion. Cardiovascular: Normal heart sounds. Exam reveals no gallop and no friction rub. No murmur heard. Pulmonary/Chest: Effort normal and breath sounds normal. No stridor. Abdominal: Soft. There is tenderness in the right lower quadrant and left lower quadrant. No hernia. Right groin pain as well. No hernia found. Musculoskeletal: Normal range of motion. He exhibits no tenderness. Neurological: He is alert and oriented to person, place, and time. Skin: Skin is warm and dry. He is not diaphoretic.    Psychiatric: He has a normal mood and affect. His behavior is normal.   Nursing note and vitals reviewed. Diagnostic Study Results     Labs -  Recent Results (from the past 12 hour(s))   CBC WITH AUTOMATED DIFF    Collection Time: 12/16/17  8:40 AM   Result Value Ref Range    WBC 7.7 4.6 - 13.2 K/uL    RBC 4.63 (L) 4.70 - 5.50 M/uL    HGB 13.1 13.0 - 16.0 g/dL    HCT 37.6 36.0 - 48.0 %    MCV 81.2 74.0 - 97.0 FL    MCH 28.3 24.0 - 34.0 PG    MCHC 34.8 31.0 - 37.0 g/dL    RDW 14.6 (H) 11.6 - 14.5 %    PLATELET 339 734 - 697 K/uL    MPV 10.6 9.2 - 11.8 FL    NEUTROPHILS 57 40 - 73 %    LYMPHOCYTES 30 21 - 52 %    MONOCYTES 9 3 - 10 %    EOSINOPHILS 4 0 - 5 %    BASOPHILS 0 0 - 2 %    ABS. NEUTROPHILS 4.4 1.8 - 8.0 K/UL    ABS. LYMPHOCYTES 2.3 0.9 - 3.6 K/UL    ABS. MONOCYTES 0.7 0.05 - 1.2 K/UL    ABS. EOSINOPHILS 0.3 0.0 - 0.4 K/UL    ABS. BASOPHILS 0.0 0.0 - 0.1 K/UL    DF AUTOMATED     METABOLIC PANEL, COMPREHENSIVE    Collection Time: 12/16/17  8:40 AM   Result Value Ref Range    Sodium 140 136 - 145 mmol/L    Potassium 4.0 3.5 - 5.5 mmol/L    Chloride 107 100 - 108 mmol/L    CO2 25 21 - 32 mmol/L    Anion gap 8 3.0 - 18 mmol/L    Glucose 81 74 - 99 mg/dL    BUN 17 7.0 - 18 MG/DL    Creatinine 1.41 (H) 0.6 - 1.3 MG/DL    BUN/Creatinine ratio 12 12 - 20      GFR est AA >60 >60 ml/min/1.73m2    GFR est non-AA 54 (L) >60 ml/min/1.73m2    Calcium 8.5 8.5 - 10.1 MG/DL    Bilirubin, total 0.3 0.2 - 1.0 MG/DL    ALT (SGPT) 30 16 - 61 U/L    AST (SGOT) 16 15 - 37 U/L    Alk.  phosphatase 116 45 - 117 U/L    Protein, total 7.4 6.4 - 8.2 g/dL    Albumin 3.5 3.4 - 5.0 g/dL    Globulin 3.9 2.0 - 4.0 g/dL    A-G Ratio 0.9 0.8 - 1.7     LIPASE    Collection Time: 12/16/17  8:40 AM   Result Value Ref Range    Lipase 108 73 - 393 U/L   URINALYSIS W/ RFLX MICROSCOPIC    Collection Time: 12/16/17  9:30 AM   Result Value Ref Range    Color YELLOW      Appearance CLEAR      Specific gravity 1.016 1.005 - 1.030      pH (UA) 6.0 5.0 - 8.0      Protein NEGATIVE  NEG mg/dL    Glucose NEGATIVE  NEG mg/dL    Ketone NEGATIVE  NEG mg/dL    Bilirubin NEGATIVE  NEG      Blood NEGATIVE  NEG      Urobilinogen 1.0 0.2 - 1.0 EU/dL    Nitrites NEGATIVE  NEG      Leukocyte Esterase NEGATIVE  NEG         Radiologic Studies -   CT ABD PELV W CONT    (Results Pending)   IMPRESSION: No acute abnormalities. Left direct inguinal hernia containing fat. Small umbilical hernia. Small hiatal hernia. Medical Decision Making   Provider Notes (Medical Decision Making):  Pt here with lower abdominal pain. Pain is similar to prior pain when he had medication issues however will need to check for infection or kidney stone and run an abdominal CT for diverticulosis vs diverticulitis. Otherwise reassuring exam, surgical pathology such as SBO, other obstruction, or abscess given lack of fever. I am the first provider for this patient. I reviewed the vital signs, available nursing notes, past medical history, past surgical history, family history and social history. Vital Signs-Reviewed the patient's vital signs. Records Reviewed: Nursing Notes and Old Medical Records (Time of Review: 9:34 AM)    ED Course: Progress Notes, Reevaluation, and Consults:  11:15 AM Progress Note: 3 hernias revealed on abdominal CT scan    -Re-evaluted the patient -feeling better after meds  -Results including CT and labs were discussed and reviewed with pt who understood the implications. Otherwise reassuring results     -We discussed the diagnosis, treatment, and plan. Next steps in close outpt care include: close PMD follow up    -They verbally convey understanding and agreement of the signs, symptoms, diagnosis, treatment and prognosis and additionally agree to follow up as discussed. All questions were answered, and we reviewed pertinent return precautions as seen in the discharge paperwork. Pt understood follow up instructions, and would return to the ED if any worsening or concerns.      Kadie Melo Gus Olivera MD  11:32 AM    Diagnosis     Clinical Impression:   1. Hiatal hernia    2. Unilateral inguinal hernia without obstruction or gangrene, recurrence not specified    3. Umbilical hernia without obstruction and without gangrene        Disposition:  Discharge    Follow-up Information     Follow up With Details Comments Contact Kaylie Najera MD Schedule an appointment as soon as possible for a visit for re evaluation of your hernias [address]      [facility] EMERGENCY DEPT  If symptoms worsen [address]  [phone]           Patient's Medications   Start Taking    OXYCODONE-ACETAMINOPHEN (PERCOCET) 5-325 MG PER TABLET    Take 1 Tab by mouth every four (4) hours as needed for Pain. Max Daily Amount: 6 Tabs. Continue Taking    CITALOPRAM HYDROBROMIDE (CELEXA PO)    Take  by mouth. DIVALPROEX ER (DEPAKOTE ER) 250 MG ER TABLET    Take 3 Tabs by mouth nightly. Indications: MIXED BIPOLAR I DISORDER    DOXAZOSIN (CARDURA) 1 MG TABLET    Take 1 mg by mouth nightly. GABAPENTIN (NEURONTIN) 400 MG CAPSULE    Take 400 mg by mouth two (2) times a day. HYDROXYZINE PAMOATE (VISTARIL) 50 MG CAPSULE    Take 50 mg by mouth two (2) times daily as needed for Itching. OMEPRAZOLE DELAYED RELEASE (PRILOSEC D/R) 20 MG TABLET    1 tab daily to BID    PALIPERIDONE (INVEGA) 6 MG SR TABLET    Take 1 Tab by mouth daily. POLYETHYLENE GLYCOL (MIRALAX) 17 GRAM PACKET    Take 1 Packet by mouth daily. TRAZODONE (DESYREL) 50 MG TABLET    Take 1 Tab by mouth nightly. Indications:  Insomnia   These Medications have changed    No medications on file   Stop Taking    No medications on file     _______________________________    Attestations:  Scribe Attestation     Deysi Winchester and Gwen Burrell acting as a scribe for and in the presence of Bolivar Garcia MD      December 16, 2017 at 9:34 AM       Provider Attestation:      I personally performed the services described in the documentation, reviewed the documentation, as recorded by the scribe in my presence, and it accurately and completely records my words and actions.  December 16, 2017 at 9:34 AM - Magdalena Ordoñez MD    _______________________________

## 2017-12-18 ENCOUNTER — HOSPITAL ENCOUNTER (EMERGENCY)
Age: 47
Discharge: HOME | End: 2017-12-18
Attending: EMERGENCY MEDICINE
Payer: MEDICAID

## 2017-12-18 VITALS
TEMPERATURE: 98.4 F | RESPIRATION RATE: 20 BRPM | OXYGEN SATURATION: 96 % | DIASTOLIC BLOOD PRESSURE: 77 MMHG | HEART RATE: 90 BPM | SYSTOLIC BLOOD PRESSURE: 114 MMHG | WEIGHT: 268 LBS | HEIGHT: 71 IN | BODY MASS INDEX: 37.52 KG/M2

## 2017-12-18 DIAGNOSIS — K42.9 UMBILICAL HERNIA WITHOUT OBSTRUCTION AND WITHOUT GANGRENE: ICD-10-CM

## 2017-12-18 DIAGNOSIS — R10.84 ABDOMINAL PAIN, GENERALIZED: Primary | ICD-10-CM

## 2017-12-18 DIAGNOSIS — K40.90 UNILATERAL INGUINAL HERNIA WITHOUT OBSTRUCTION OR GANGRENE, RECURRENCE NOT SPECIFIED: ICD-10-CM

## 2017-12-18 DIAGNOSIS — K44.9 HIATAL HERNIA: ICD-10-CM

## 2017-12-18 PROCEDURE — 99281 EMR DPT VST MAYX REQ PHY/QHP: CPT

## 2017-12-18 RX ORDER — PANTOPRAZOLE SODIUM 40 MG/1
40 TABLET, DELAYED RELEASE ORAL DAILY
COMMUNITY
End: 2018-01-19

## 2017-12-18 RX ORDER — ONDANSETRON 4 MG/1
4 TABLET, FILM COATED ORAL
Qty: 12 TAB | Refills: 0 | Status: SHIPPED | OUTPATIENT
Start: 2017-12-18 | End: 2018-06-21 | Stop reason: ALTCHOICE

## 2017-12-18 RX ORDER — FAMOTIDINE 20 MG/1
20 TABLET, FILM COATED ORAL 2 TIMES DAILY
Qty: 30 TAB | Refills: 0 | Status: SHIPPED | OUTPATIENT
Start: 2017-12-18 | End: 2018-01-02

## 2017-12-18 RX ORDER — FAMOTIDINE 40 MG/1
40 TABLET, FILM COATED ORAL DAILY
COMMUNITY
End: 2017-12-18 | Stop reason: RX

## 2017-12-18 NOTE — DISCHARGE INSTRUCTIONS
Call today to make followup appointment with surgeon and primary care physician. Return if fevers, persistent vomiting, unable to eat/drink, or other worrisome symptoms. You may require surgical repair of your hernias.

## 2017-12-18 NOTE — ED TRIAGE NOTES
Pt was seen at [facility] on Saturday and dx'd with a hiatal hernia. Saw his doctor today and states he tried to get him an appt with a GI doc but they couldn't find anyone that take s his insurance. States he was given percocet on Saturday and that helped his pain. Was given 12 and has none left.  States his stomach is still hurting

## 2017-12-18 NOTE — ED PROVIDER NOTES
EMERGENCY DEPARTMENT HISTORY AND PHYSICAL EXAM    4:09 PM      Date: 12/18/2017  Patient Name: Katlin Kent    History of Presenting Illness     Chief Complaint   Patient presents with    Abdominal Pain         History Provided By: Patient    Chief Complaint: abd pain  Duration:  Days  Timing:  Acute and Worsening  Location: diffuse  Quality: Aching  Severity: Mild  Modifying Factors: n/a  Associated Symptoms: All other sx denied. No other complaints at this time. Additional History (Context): 4:09 PM Katlin Kent is a 52 y.o. male with a h/o GERD, Bipolar disorder, and Smoking presents to the ED with a family member with c/o  abd pain onset several days ago that he is unable to schedule a f/u appointment with a GI physician that takes his insurance. Pt was evaluated at [facility] 2 days ago when a NL CBC, negative UA, CMP with mild renal insufficiency, creatinine of 1.4, CT abd pelvis that demonstrated left inguinal hernia that contained some fat, small umbilical hernia, and a small hiatal hernia were obtained. Pt stated he feels that his pain has gotten worse. Pt denied n/v/d, CP, SOB, fever, urinary sx's, numbness, weakness, or cough. All other sx denied. No other complaints at this time. PCP: Konrad Sicard, MD        Past History     Past Medical History:  Past Medical History:   Diagnosis Date    Bipolar disorder ([facility] [address]     GERD (gastroesophageal reflux disease)     Schizophrenia ([facility] [facility]     Suicidal behavior        Past Surgical History:  History reviewed. No pertinent surgical history. Family History:  Family History   Problem Relation Age of Onset    Diabetes Mother        Social History:  Social History   Substance Use Topics    Smoking status: Current Every Day Smoker    Smokeless tobacco: Never Used    Alcohol use No       Allergies: Allergies   Allergen Reactions    Onion Rash         Review of Systems     Review of Systems   Constitutional: Negative for fever.    HENT: Negative for sore throat. Eyes: Negative for redness and visual disturbance. Respiratory: Negative for shortness of breath and wheezing. Cardiovascular: Negative for chest pain. Gastrointestinal: Positive for abdominal pain. Negative for diarrhea, nausea and vomiting. Endocrine: Negative for polyuria. Genitourinary: Positive for dysuria. Musculoskeletal: Negative for arthralgias and neck stiffness. Skin: Negative for rash. Neurological: Negative for headaches. All other systems reviewed and are negative. Physical Exam     Visit Vitals    /77 (BP 1 Location: Left arm)    Pulse 90    Temp 98.4 °F (36.9 °C)    Resp 20    Ht 5' 11\" (1.803 m)    Wt 121.6 kg (268 lb)    SpO2 96%    BMI 37.38 kg/m2       Physical Exam   Constitutional: He appears well-developed and well-nourished. No distress. HENT:   Head: Normocephalic and atraumatic. Eyes: Conjunctivae are normal. Right eye exhibits no discharge. Left eye exhibits no discharge. Neck: Neck supple. Cardiovascular: Normal rate and normal heart sounds. Pulmonary/Chest: Effort normal. No respiratory distress. Abdominal: Soft. Bowel sounds are normal. He exhibits no distension. Small hernia at umbilicus, mildly tender. No evidence of incarceration or other complication  Left inguinal hernia, mildly tender, no evidence of complication  No testicular tenderness or edema. No erythema. No penile discharge. No inguinal adenopathy   Musculoskeletal: Normal range of motion. He exhibits no edema. Neurological: He is alert. He exhibits normal muscle tone. Skin: Skin is warm and dry. He is not diaphoretic. Psychiatric: He has a normal mood and affect. His behavior is normal.   Nursing note and vitals reviewed. Medical Decision Making   I am the first provider for this patient.     I reviewed the vital signs, available nursing notes, past medical history, past surgical history, family history and social history. Vital Signs-Reviewed the patient's vital signs. Pulse Oximetry Analysis -  96 on room air (Interpretation)NL. Records Reviewed: Nursing Notes and Old Medical Records (Time of Review: 4:09 PM)    Provider Notes (Medical Decision Making): MDM  Pt with abdominal pain, ongoing. Unable to establish FU appt d/t insurance issues. No evidence of acute sx abdomen, complication related to hernia, or other emergent process. Will refer to surgery. DC home with strict FU and RTED Instructions. Pt agrees and plan. Diagnosis     Clinical Impression:   1. Abdominal pain, generalized    2. Umbilical hernia without obstruction and without gangrene    3. Unilateral inguinal hernia without obstruction or gangrene, recurrence not specified    4. Hiatal hernia        Disposition: d/c    Follow-up Information     Follow up With Details Comments Perma Del Toro MD Call in 1 day  [address]            Patient's Medications   Start Taking    FAMOTIDINE (PEPCID) 20 MG TABLET    Take 1 Tab by mouth two (2) times a day for 15 days. ONDANSETRON HCL (ZOFRAN, AS HYDROCHLORIDE,) 4 MG TABLET    Take 1 Tab by mouth every six (6) hours as needed for Nausea. Continue Taking    CITALOPRAM HYDROBROMIDE (CELEXA PO)    Take  by mouth. DIVALPROEX ER (DEPAKOTE ER) 250 MG ER TABLET    Take 3 Tabs by mouth nightly. Indications: MIXED BIPOLAR I DISORDER    DOXAZOSIN (CARDURA) 1 MG TABLET    Take 1 mg by mouth nightly. GABAPENTIN (NEURONTIN) 400 MG CAPSULE    Take 400 mg by mouth two (2) times a day. HYDROXYZINE PAMOATE (VISTARIL) 50 MG CAPSULE    Take 50 mg by mouth two (2) times daily as needed for Itching. OMEPRAZOLE DELAYED RELEASE (PRILOSEC D/R) 20 MG TABLET    1 tab daily to BID    PALIPERIDONE (INVEGA) 6 MG SR TABLET    Take 1 Tab by mouth daily. PANTOPRAZOLE (PROTONIX) 40 MG TABLET    Take 40 mg by mouth daily.     POLYETHYLENE GLYCOL (MIRALAX) 17 GRAM PACKET    Take 1 Packet by mouth daily. TRAZODONE (DESYREL) 50 MG TABLET    Take 1 Tab by mouth nightly. Indications: Insomnia   These Medications have changed    No medications on file   Stop Taking    FAMOTIDINE (PEPCID) 40 MG TABLET    Take 40 mg by mouth daily. OXYCODONE-ACETAMINOPHEN (PERCOCET) 5-325 MG PER TABLET    Take 1 Tab by mouth every four (4) hours as needed for Pain. Max Daily Amount: 6 Tabs.       _______________________________    Attestations:  [facility] acting as a scribe for and in the presence of Faisal Dickinson DO      December 18, 2017 at 4:28 PM       Provider Attestation:      I personally performed the services described in the documentation, reviewed the documentation, as recorded by the scribe in my presence, and it accurately and completely records my words and actions.  December 18, 2017 at 4:28 PM - [facility], DO    _______________________________

## 2018-01-12 ENCOUNTER — OFFICE VISIT (OUTPATIENT)
Dept: SURGERY | Age: 48
End: 2018-01-12

## 2018-01-12 VITALS — RESPIRATION RATE: 20 BRPM | SYSTOLIC BLOOD PRESSURE: 118 MMHG | DIASTOLIC BLOOD PRESSURE: 84 MMHG

## 2018-01-12 DIAGNOSIS — K40.90 LEFT INGUINAL HERNIA: Primary | ICD-10-CM

## 2018-01-12 RX ORDER — FAMOTIDINE 40 MG/1
40 TABLET, FILM COATED ORAL DAILY
COMMUNITY
End: 2018-06-21 | Stop reason: ALTCHOICE

## 2018-01-12 NOTE — PROGRESS NOTES
Progress Note    Patient: Loren Maki  MRN: [id number]  SSN: [id number]   YOB: 1970  Age: 52 y.o. Sex: male     Chief Complaint   Patient presents with    Hernia (Non Specific)     ct results       HPI    Mr. Sapna Juarez is a 78-year-old gentleman who presents with his mom for multiple hernias. He has a small hiatal hernia, a very small umbilical hernia, and a nonpalpable CT discovered small left inguinal hernia. None of these hernias are symptomatic and in fact he knew of none of them prior to his CT scan. Think given this and his other mental health issues I would not elect to fix any of them at this point. I discussed all them with him and his mother and they are quite happy about that. Past Medical History:   Diagnosis Date    Bipolar disorder ([facility]     GERD (gastroesophageal reflux disease)     Schizophrenia ([facility]     Suicidal behavior      History reviewed. No pertinent surgical history. Allergies   Allergen Reactions    Onion Rash     Current Outpatient Prescriptions   Medication Sig Dispense Refill    famotidine (PEPCID) 40 mg tablet Take 40 mg by mouth daily.  pantoprazole (PROTONIX) 40 mg tablet Take 40 mg by mouth daily.  divalproex ER (DEPAKOTE ER) 250 mg ER tablet Take 3 Tabs by mouth nightly. Indications: MIXED BIPOLAR I DISORDER 90 Tab 1    paliperidone (INVEGA) 6 mg SR tablet Take 1 Tab by mouth daily. 30 Tab 1    traZODone (DESYREL) 50 mg tablet Take 1 Tab by mouth nightly. Indications: Insomnia 30 Tab 0    gabapentin (NEURONTIN) 400 mg capsule Take 400 mg by mouth two (2) times a day.  hydrOXYzine pamoate (VISTARIL) 50 mg capsule Take 50 mg by mouth two (2) times daily as needed for Itching.  ondansetron hcl (ZOFRAN, AS HYDROCHLORIDE,) 4 mg tablet Take 1 Tab by mouth every six (6) hours as needed for Nausea. 12 Tab 0    polyethylene glycol (MIRALAX) 17 gram packet Take 1 Packet by mouth daily.  30 Packet 1    doxazosin (CARDURA) 1 mg tablet Take 1 mg by mouth nightly.  CITALOPRAM HYDROBROMIDE (CELEXA PO) Take  by mouth.  Omeprazole delayed release (PRILOSEC D/R) 20 mg tablet 1 tab daily to BID 60 Tab 1     Social History     Social History    Marital status: UNKNOWN     Spouse name: N/A    Number of children: N/A    Years of education: N/A     Occupational History    Not on file. Social History Main Topics    Smoking status: Current Every Day Smoker    Smokeless tobacco: Never Used    Alcohol use No    Drug use: No    Sexual activity: Not on file     Other Topics Concern    Not on file     Social History Narrative     Family History   Problem Relation Age of Onset    Diabetes Mother          Review of systems:  Patient denies any reflux, emesis, abdominal pain, change in bowel habits, hematochezia, melena, fever, weight loss, fatigue chills, dermatitis, abnormal moles, change in vision, vertigo, epistaxis, dysphagia, hoarseness, chest pain, palpitations, hypertension, edema, cough, shortness of breath, wheezing, hemoptysis, snoring, hematuria, diabetes, thyroid disease, anemia, bruising, history of blood transfusion, dizziness, headache, or fainting.     Physical Examination    Well developed well nourished male in no apparent distress  Visit Vitals    /84    Resp 20      Head: normocephalic, atraumatic  Mouth: Clear, no overt lesions, oral mucosa pink and moist  Neck: supple, no masses, no adenopathy or carotid bruits, trachea midline  Resp: clear to auscultation bilaterally, no wheeze, rhonchi or rales, excursions normal and symmetrical  Cardio: Regular rate and rhythm, no murmurs, clicks, gallops or rubs, no edema or varicosities  Abdomen: soft, nontender, nondistended, normoactive bowel sounds, no hernias, no hepatosplenomegaly,   Back: Deferred  Extremeties: warm, well-perfused, no tenderness or swelling, normal gait/station  Neuro: sensation and strength grossly intact and symmetrical  Psych: alert and oriented to person, place and time  Breast exam deferred    IMPRESSION  Hernias as discussed above    PLAN  Orders Placed This Encounter    famotidine (PEPCID) 40 mg tablet     Sig: Take 40 mg by mouth daily.      Follow-up as needed  Karo Prakash MD

## 2018-01-12 NOTE — MR AVS SNAPSHOT
Visit Information Date & Time Provider Department Dept. Phone Encounter #  
 1/12/2018  1:00 PM [facility] [phone] Upcoming Health Maintenance Date Due DTaP/Tdap/Td series (1 - Tdap) 5/2/1991 Pneumococcal 19-64 Medium Risk (1 of 1 - PPSV23) 2/1/2018* *Topic was postponed. The date shown is not the original due date. Allergies as of 1/12/2018  Review Complete On: 1/12/2018 By: Martha Gonzalez MD  
  
 Severity Noted Reaction Type Reactions Onion  12/30/2016    Rash Current Immunizations  Reviewed on 11/1/2017 Name Date Influenza Vaccine 10/1/2017 Not reviewed this visit Vitals BP Resp Smoking Status 118/84 20 Current Every Day Smoker Preferred Pharmacy Pharmacy Name Phone [facility], [address] [place] [address] [phone] Your Updated Medication List  
  
   
This list is accurate as of: 1/12/18  1:23 PM.  Always use your most recent med list.  
  
  
  
  
 CARDURA 1 mg tablet Generic drug:  doxazosin Take 1 mg by mouth nightly. CELEXA PO Take  by mouth. divalproex  mg ER tablet Commonly known as:  DEPAKOTE ER Take 3 Tabs by mouth nightly. Indications: MIXED BIPOLAR I DISORDER  
  
 famotidine 40 mg tablet Commonly known as:  PEPCID Take 40 mg by mouth daily. gabapentin 400 mg capsule Commonly known as:  NEURONTIN Take 400 mg by mouth two (2) times a day. Omeprazole delayed release 20 mg tablet Commonly known as:  PRILOSEC D/R  
1 tab daily to BID  
  
 ondansetron hcl 4 mg tablet Commonly known as:  ZOFRAN (AS HYDROCHLORIDE) Take 1 Tab by mouth every six (6) hours as needed for Nausea. paliperidone 6 mg SR tablet Commonly known as:  INVEGA Take 1 Tab by mouth daily. pantoprazole 40 mg tablet Commonly known as:  PROTONIX Take 40 mg by mouth daily. polyethylene glycol 17 gram packet Commonly known as:  Jason Mano Take 1 Packet by mouth daily. traZODone 50 mg tablet Commonly known as:  Huey Haring Take 1 Tab by mouth nightly. Indications: Insomnia VISTARIL 50 mg capsule Generic drug:  hydrOXYzine pamoate Take 50 mg by mouth two (2) times daily as needed for Itching. Introducing [facility]! [employer] introduces [employer] patient portal. Now you can access parts of your medical record, email your doctor's office, and request medication refills online. 1. In your internet browser, go to [email or web address] 2. Click on the First Time User? Click Here link in the Sign In box. You will see the New Member Sign Up page. 3. Enter your [employer] Access Code exactly as it appears below. You will not need to use this code after youve completed the sign-up process. If you do not sign up before the expiration date, you must request a new code. · [employer] Access Code: [email or web address] Expires: 3/16/2018 11:19 AM 
 
4. Enter the last four digits of your Social Security Number (xxxx) and Date of Birth (mm/dd/yyyy) as indicated and click Submit. You will be taken to the next sign-up page. 5. Create a [employer] ID. This will be your [employer] login ID and cannot be changed, so think of one that is secure and easy to remember. 6. Create a [employer] password. You can change your password at any time. 7. Enter your Password Reset Question and Answer. This can be used at a later time if you forget your password. 8. Enter your e-mail address. You will receive e-mail notification when new information is available in [address] 9. Click Sign Up. You can now view and download portions of your medical record. 10. Click the Download Summary menu link to download a portable copy of your medical information.  
 
If you have questions, please visit the Frequently Asked Questions section of the Core[employer]. Remember, [employer] is NOT to be used for urgent needs. For medical emergencies, dial 911. Now available from your iPhone and Android! Please provide this summary of care documentation to your next provider. Your primary care clinician is listed as Teetee Avila. If you have any questions after today's visit, please call [phone].

## 2018-01-19 ENCOUNTER — APPOINTMENT (OUTPATIENT)
Dept: GENERAL RADIOLOGY | Age: 48
End: 2018-01-19
Attending: PHYSICIAN ASSISTANT
Payer: MEDICAID

## 2018-01-19 ENCOUNTER — HOSPITAL ENCOUNTER (EMERGENCY)
Age: 48
Discharge: HOME | End: 2018-01-19
Attending: EMERGENCY MEDICINE
Payer: MEDICAID

## 2018-01-19 VITALS
HEART RATE: 75 BPM | BODY MASS INDEX: 37.52 KG/M2 | DIASTOLIC BLOOD PRESSURE: 93 MMHG | OXYGEN SATURATION: 97 % | TEMPERATURE: 97.9 F | WEIGHT: 268 LBS | RESPIRATION RATE: 18 BRPM | SYSTOLIC BLOOD PRESSURE: 138 MMHG | HEIGHT: 71 IN

## 2018-01-19 DIAGNOSIS — K27.9 PUD (PEPTIC ULCER DISEASE): Primary | ICD-10-CM

## 2018-01-19 LAB
ANION GAP SERPL CALC-SCNC: 7 MMOL/L (ref 3–18)
APPEARANCE UR: CLEAR
BACTERIA URNS QL MICRO: ABNORMAL /HPF
BASOPHILS # BLD: 0 K/UL (ref 0–0.1)
BASOPHILS NFR BLD: 0 % (ref 0–2)
BILIRUB UR QL: NEGATIVE
BUN SERPL-MCNC: 5 MG/DL (ref 7–18)
BUN/CREAT SERPL: 4 (ref 12–20)
CALCIUM SERPL-MCNC: 8.6 MG/DL (ref 8.5–10.1)
CHLORIDE SERPL-SCNC: 107 MMOL/L (ref 100–108)
CO2 SERPL-SCNC: 27 MMOL/L (ref 21–32)
COLOR UR: YELLOW
CREAT SERPL-MCNC: 1.42 MG/DL (ref 0.6–1.3)
DIFFERENTIAL METHOD BLD: ABNORMAL
EOSINOPHIL # BLD: 0.3 K/UL (ref 0–0.4)
EOSINOPHIL NFR BLD: 4 % (ref 0–5)
EPITH CASTS URNS QL MICRO: ABNORMAL /LPF (ref 0–5)
ERYTHROCYTE [DISTWIDTH] IN BLOOD BY AUTOMATED COUNT: 14.9 % (ref 11.6–14.5)
GLUCOSE SERPL-MCNC: 94 MG/DL (ref 74–99)
GLUCOSE UR STRIP.AUTO-MCNC: NEGATIVE MG/DL
HCT VFR BLD AUTO: 37.7 % (ref 36–48)
HGB BLD-MCNC: 13 G/DL (ref 13–16)
HGB UR QL STRIP: NEGATIVE
KETONES UR QL STRIP.AUTO: NEGATIVE MG/DL
LEUKOCYTE ESTERASE UR QL STRIP.AUTO: ABNORMAL
LIPASE SERPL-CCNC: 81 U/L (ref 73–393)
LYMPHOCYTES # BLD: 2 K/UL (ref 0.9–3.6)
LYMPHOCYTES NFR BLD: 31 % (ref 21–52)
MCH RBC QN AUTO: 28.3 PG (ref 24–34)
MCHC RBC AUTO-ENTMCNC: 34.5 G/DL (ref 31–37)
MCV RBC AUTO: 82 FL (ref 74–97)
MONOCYTES # BLD: 0.7 K/UL (ref 0.05–1.2)
MONOCYTES NFR BLD: 11 % (ref 3–10)
NEUTS SEG # BLD: 3.4 K/UL (ref 1.8–8)
NEUTS SEG NFR BLD: 54 % (ref 40–73)
NITRITE UR QL STRIP.AUTO: NEGATIVE
PH UR STRIP: 6 [PH] (ref 5–8)
PLATELET # BLD AUTO: 173 K/UL (ref 135–420)
PMV BLD AUTO: 10.8 FL (ref 9.2–11.8)
POTASSIUM SERPL-SCNC: 4 MMOL/L (ref 3.5–5.5)
PROT UR STRIP-MCNC: NEGATIVE MG/DL
RBC # BLD AUTO: 4.6 M/UL (ref 4.7–5.5)
SODIUM SERPL-SCNC: 141 MMOL/L (ref 136–145)
SP GR UR REFRACTOMETRY: 1.01 (ref 1–1.03)
UROBILINOGEN UR QL STRIP.AUTO: 0.2 EU/DL (ref 0.2–1)
WBC # BLD AUTO: 6.4 K/UL (ref 4.6–13.2)
WBC URNS QL MICRO: ABNORMAL /HPF (ref 0–4)

## 2018-01-19 PROCEDURE — 74011250636 HC RX REV CODE- 250/636: Performed by: PHYSICIAN ASSISTANT

## 2018-01-19 PROCEDURE — 96374 THER/PROPH/DIAG INJ IV PUSH: CPT

## 2018-01-19 PROCEDURE — 74011000250 HC RX REV CODE- 250: Performed by: PHYSICIAN ASSISTANT

## 2018-01-19 PROCEDURE — 99283 EMERGENCY DEPT VISIT LOW MDM: CPT

## 2018-01-19 PROCEDURE — 96361 HYDRATE IV INFUSION ADD-ON: CPT

## 2018-01-19 PROCEDURE — 81001 URINALYSIS AUTO W/SCOPE: CPT

## 2018-01-19 PROCEDURE — 80048 BASIC METABOLIC PNL TOTAL CA: CPT

## 2018-01-19 PROCEDURE — 96375 TX/PRO/DX INJ NEW DRUG ADDON: CPT

## 2018-01-19 PROCEDURE — 74011250637 HC RX REV CODE- 250/637: Performed by: PHYSICIAN ASSISTANT

## 2018-01-19 PROCEDURE — 85025 COMPLETE CBC W/AUTO DIFF WBC: CPT

## 2018-01-19 PROCEDURE — 74018 RADEX ABDOMEN 1 VIEW: CPT

## 2018-01-19 PROCEDURE — 83690 ASSAY OF LIPASE: CPT

## 2018-01-19 RX ORDER — MORPHINE SULFATE 4 MG/ML
4 INJECTION INTRAVENOUS
Status: COMPLETED | OUTPATIENT
Start: 2018-01-19 | End: 2018-01-19

## 2018-01-19 RX ORDER — ONDANSETRON 2 MG/ML
4 INJECTION INTRAMUSCULAR; INTRAVENOUS
Status: COMPLETED | OUTPATIENT
Start: 2018-01-19 | End: 2018-01-19

## 2018-01-19 RX ORDER — LIDOCAINE HYDROCHLORIDE 20 MG/ML
15 SOLUTION OROPHARYNGEAL AS NEEDED
Status: DISCONTINUED | OUTPATIENT
Start: 2018-01-19 | End: 2018-01-19

## 2018-01-19 RX ORDER — OMEPRAZOLE 20 MG/1
20 CAPSULE, DELAYED RELEASE ORAL 2 TIMES DAILY
Qty: 60 CAP | Refills: 0 | Status: SHIPPED | OUTPATIENT
Start: 2018-01-19 | End: 2018-02-18

## 2018-01-19 RX ORDER — LIDOCAINE HYDROCHLORIDE 20 MG/ML
15 SOLUTION OROPHARYNGEAL AS NEEDED
Status: COMPLETED | OUTPATIENT
Start: 2018-01-19 | End: 2018-01-19

## 2018-01-19 RX ADMIN — LIDOCAINE HYDROCHLORIDE 15 ML: 20 SOLUTION ORAL; TOPICAL at 14:21

## 2018-01-19 RX ADMIN — SODIUM CHLORIDE 1000 ML: 900 INJECTION, SOLUTION INTRAVENOUS at 12:02

## 2018-01-19 RX ADMIN — MORPHINE SULFATE 4 MG: 4 INJECTION INTRAVENOUS at 12:27

## 2018-01-19 RX ADMIN — PHENOBARBITAL 30 ML: 16.2; .1037; .0065; .0194 ELIXIR ORAL at 14:21

## 2018-01-19 RX ADMIN — ONDANSETRON 4 MG: 2 INJECTION INTRAMUSCULAR; INTRAVENOUS at 12:02

## 2018-01-19 NOTE — DISCHARGE INSTRUCTIONS
Peptic Ulcer Disease: Care Instructions  Your Care Instructions    Peptic ulcers are sores on the inside of the stomach or the small intestine. They are usually caused by an infection with bacteria or from use of nonsteroidal anti-inflammatory drugs (NSAIDs). NSAIDs include aspirin, ibuprofen (Advil), and naproxen (Aleve). Your doctor may have prescribed medicine to reduce stomach acid. You also may need to take antibiotics if your peptic ulcers are caused by an infection. You can help your stomach heal and keep ulcers from coming back by making some changes in your lifestyle. Quit smoking, limit caffeine and alcohol, and reduce stress. Follow-up care is a key part of your treatment and safety. Be sure to make and go to all appointments, and call your doctor if you are having problems. It's also a good idea to know your test results and keep a list of the medicines you take. How can you care for yourself at home? · Take your medicines exactly as prescribed. Call your doctor if you think you are having a problem with your medicine. · Do not take aspirin or other NSAIDs such as ibuprofen (Advil or Motrin) or naproxen (Aleve). Ask your doctor what you can take for pain. · Do not smoke. Smoking can make ulcers worse. If you need help quitting, talk to your doctor about stop-smoking programs and medicines. These can increase your chances of quitting for good. · Drink in moderation or avoid drinking alcohol. · Do not drink beverages that have caffeine if they bother your stomach. These include coffee, tea, and soda. · Eat a balanced diet of small, frequent meals. Make an appointment with a dietitian if you need help planning your meals. · Reduce stress. Avoid people and places that make you feel anxious, if you can. Learn ways to reduce stress, such as biofeedback, guided imagery, and meditation. When should you call for help? Call 911 anytime you think you may need emergency care.  For example, call if:  ? · You vomit blood or what looks like coffee grounds. ? · You pass maroon or very bloody stools. ?Call your doctor now or seek immediate medical care if:  ? · You have new or worse belly pain. ? · Your stools are black and look like tar, or they have streaks of blood. ? · You are vomiting. ? Watch closely for changes in your health, and be sure to contact your doctor if:  ? · You do not feel better as expected. Where can you learn more? Go to http://martha-margaret.info/. Enter X272 in the search box to learn more about \"Peptic Ulcer Disease: Care Instructions. \"  Current as of: May 12, 2017  Content Version: 11.4  © 6326-5099 Healthwise, [employer]. Care instructions adapted under license by [employer] (which disclaims liability or warranty for this information). If you have questions about a medical condition or this instruction, always ask your healthcare professional. [place] [address] any warranty or liability for your use of this information.

## 2018-01-19 NOTE — ED PROVIDER NOTES
HPI Comments: Patient is a 51 y/o male w/ PMH GERD, Bipolar D/O, Schizophrenia who presents to the ER c/o abdominal pain and nausea. Patient states he has had intermittent abdominal pain for several months. He has been seen for the same pain several times, however states he has not gotten any better. Patient has followed up with his PCP, however has not been referred to GI yet. He reports associated feeling of feeling hungry, however he eats regularly. He has also had some nausea, but no vomiting. He rates his pain 6/10, and states it sometimes radiates to his back. He denied any fevers, chills, SOB, chest pain, palpations, vomiting, constipation and has no other complaints. Patient is a 52 y.o. male presenting with abdominal pain. The history is provided by the patient. Abdominal Pain    Associated symptoms include nausea. Pertinent negatives include no fever, no vomiting, no dysuria, no headaches and no chest pain. Past Medical History:   Diagnosis Date    Bipolar disorder ([facility] [address]     GERD (gastroesophageal reflux disease)     Schizophrenia ([facility]     Suicidal behavior        No past surgical history on file. Family History:   Problem Relation Age of Onset    Diabetes Mother        Social History     Social History    Marital status: UNKNOWN     Spouse name: N/A    Number of children: N/A    Years of education: N/A     Occupational History    Not on file. Social History Main Topics    Smoking status: Current Every Day Smoker    Smokeless tobacco: Never Used    Alcohol use No    Drug use: No    Sexual activity: Not on file     Other Topics Concern    Not on file     Social History Narrative         ALLERGIES: Onion    Review of Systems   Constitutional: Negative for chills, fatigue and fever. HENT: Negative. Negative for sore throat. Eyes: Negative. Respiratory: Negative for cough and shortness of breath. Cardiovascular: Negative for chest pain and palpitations. Gastrointestinal: Positive for abdominal pain and nausea. Negative for vomiting. Genitourinary: Negative for dysuria. Musculoskeletal: Negative. Skin: Negative. Neurological: Negative for dizziness, weakness, light-headedness and headaches. Psychiatric/Behavioral: Negative. All other systems reviewed and are negative. Vitals:    01/19/18 1034   BP: 131/84   Pulse: 87   Resp: 14   Temp: 97.9 °F (36.6 °C)   SpO2: 99%   Weight: 121.6 kg (268 lb)   Height: 5' 11\" (1.803 m)            Physical Exam   Constitutional: He is oriented to person, place, and time. He appears well-developed and well-nourished. No distress. HENT:   Head: Normocephalic and atraumatic. Mouth/Throat: Oropharynx is clear and moist.   Eyes: Conjunctivae are normal. No scleral icterus. Neck: Neck supple. No JVD present. No tracheal deviation present. Cardiovascular: Normal rate, regular rhythm and normal heart sounds. Pulmonary/Chest: Effort normal and breath sounds normal. No respiratory distress. He has no wheezes. Abdominal: Soft. Bowel sounds are normal. He exhibits no distension and no mass. There is tenderness in the epigastric area. There is no rebound, no guarding and no CVA tenderness. Musculoskeletal: Normal range of motion. Neurological: He is alert and oriented to person, place, and time. He has normal strength. Gait normal. GCS eye subscore is 4. GCS verbal subscore is 5. GCS motor subscore is 6. Skin: Skin is warm and dry. He is not diaphoretic. Psychiatric: He has a normal mood and affect. Nursing note and vitals reviewed. MDM  Number of Diagnoses or Management Options  Diagnosis management comments: 12:27 PM  53 y/o male c/o abd pain x 1 month. Previously seen with normal scan, labs. Has followed up with PCP, however not seen GI. Symptoms most likely related to possible ulcer due to hx of GERD.   Will plan on labs, meds and will reeval.  No signs of acute abd on exam.  Aleta ZAIDI Gerardo Patterson PA-C    2:41 PM  Pt feeling much better after GI cocktail. Discussed with pt his symptoms most likely consistent with suspected gastric or peptic ulcer. Will give GI follow up and prescribe GERD meds. Advised to limit diet containing acidic foods, caffeine until f/u. All questions answered and patient in agreement with plan of care. Will plan for discharge.   Kandace Monahan PA-C    Clinical Impression:  Peptic ulcer disease         Amount and/or Complexity of Data Reviewed  Clinical lab tests: ordered and reviewed  Tests in the radiology section of CPT®: ordered and reviewed    Risk of Complications, Morbidity, and/or Mortality  Presenting problems: moderate  Diagnostic procedures: moderate  Management options: moderate    Patient Progress  Patient progress: stable    ED Course       Procedures           Vitals:  Patient Vitals for the past 12 hrs:   Temp Pulse Resp BP SpO2   01/19/18 1034 97.9 °F (36.6 °C) 87 14 131/84 99 %         Medications ordered:   Medications   ondansetron (ZOFRAN) injection 4 mg (4 mg IntraVENous Given 1/19/18 1202)   sodium chloride 0.9 % bolus infusion 1,000 mL (1,000 mL IntraVENous New Bag 1/19/18 1202)   morphine 4 mg (4 mg IntraVENous Given 1/19/18 1227)   maalox/donnatal (GI COCKTAIL COMPOUND) oral liquid (30 mL Oral Given 1/19/18 1421)   lidocaine (XYLOCAINE) 2 % viscous solution 15 mL (15 mL Mouth/Throat Given 1/19/18 1421)         Lab findings:  Recent Results (from the past 12 hour(s))   URINALYSIS W/ RFLX MICROSCOPIC    Collection Time: 01/19/18 11:52 AM   Result Value Ref Range    Color YELLOW      Appearance CLEAR      Specific gravity 1.006 1.005 - 1.030      pH (UA) 6.0 5.0 - 8.0      Protein NEGATIVE  NEG mg/dL    Glucose NEGATIVE  NEG mg/dL    Ketone NEGATIVE  NEG mg/dL    Bilirubin NEGATIVE  NEG      Blood NEGATIVE  NEG      Urobilinogen 0.2 0.2 - 1.0 EU/dL    Nitrites NEGATIVE  NEG      Leukocyte Esterase TRACE (A) NEG     CBC WITH AUTOMATED DIFF    Collection Time: 01/19/18 11:52 AM   Result Value Ref Range    WBC 6.4 4.6 - 13.2 K/uL    RBC 4.60 (L) 4.70 - 5.50 M/uL    HGB 13.0 13.0 - 16.0 g/dL    HCT 37.7 36.0 - 48.0 %    MCV 82.0 74.0 - 97.0 FL    MCH 28.3 24.0 - 34.0 PG    MCHC 34.5 31.0 - 37.0 g/dL    RDW 14.9 (H) 11.6 - 14.5 %    PLATELET 023 306 - 944 K/uL    MPV 10.8 9.2 - 11.8 FL    NEUTROPHILS 54 40 - 73 %    LYMPHOCYTES 31 21 - 52 %    MONOCYTES 11 (H) 3 - 10 %    EOSINOPHILS 4 0 - 5 %    BASOPHILS 0 0 - 2 %    ABS. NEUTROPHILS 3.4 1.8 - 8.0 K/UL    ABS. LYMPHOCYTES 2.0 0.9 - 3.6 K/UL    ABS. MONOCYTES 0.7 0.05 - 1.2 K/UL    ABS. EOSINOPHILS 0.3 0.0 - 0.4 K/UL    ABS. BASOPHILS 0.0 0.0 - 0.1 K/UL    DF AUTOMATED     METABOLIC PANEL, BASIC    Collection Time: 01/19/18 11:52 AM   Result Value Ref Range    Sodium 141 136 - 145 mmol/L    Potassium 4.0 3.5 - 5.5 mmol/L    Chloride 107 100 - 108 mmol/L    CO2 27 21 - 32 mmol/L    Anion gap 7 3.0 - 18 mmol/L    Glucose 94 74 - 99 mg/dL    BUN 5 (L) 7.0 - 18 MG/DL    Creatinine 1.42 (H) 0.6 - 1.3 MG/DL    BUN/Creatinine ratio 4 (L) 12 - 20      GFR est AA >60 >60 ml/min/1.73m2    GFR est non-AA 53 (L) >60 ml/min/1.73m2    Calcium 8.6 8.5 - 10.1 MG/DL   LIPASE    Collection Time: 01/19/18 11:52 AM   Result Value Ref Range    Lipase 81 73 - 393 U/L   URINE MICROSCOPIC ONLY    Collection Time: 01/19/18 11:52 AM   Result Value Ref Range    WBC 0 to 3 0 - 4 /hpf    Epithelial cells FEW 0 - 5 /lpf    Bacteria FEW (A) NEG /hpf       EKG interpretation by ED Physician:      X-Ray, CT or other radiology findings or impressions:  XR ABD (KUB)   Final Result          Progress notes, Consult notes or additional Procedure notes:       Reevaluation of patient:       Disposition:  Diagnosis:   1.  PUD (peptic ulcer disease)        Disposition: Discharged    Follow-up Information     Follow up With Details Comments Contact Info    [facility] EMERGENCY DEPT  If symptoms worsen [address] [place] [address] [id number] [phone]    Sergey Ashley Benedict MD Call today ER follow up for abdominal pain and suspected pepcid ulcer disease [address]  [facility]  [phone]             Patient's Medications   Start Taking    OMEPRAZOLE (PRILOSEC) 20 MG CAPSULE    Take 1 Cap by mouth two (2) times a day for 30 days. Indications: Duodenal Ulcer, gastroesophageal reflux disease   Continue Taking    CITALOPRAM HYDROBROMIDE (CELEXA PO)    Take  by mouth. DIVALPROEX ER (DEPAKOTE ER) 250 MG ER TABLET    Take 3 Tabs by mouth nightly. Indications: MIXED BIPOLAR I DISORDER    DOXAZOSIN (CARDURA) 1 MG TABLET    Take 1 mg by mouth nightly. FAMOTIDINE (PEPCID) 40 MG TABLET    Take 40 mg by mouth daily. GABAPENTIN (NEURONTIN) 400 MG CAPSULE    Take 400 mg by mouth two (2) times a day. HYDROXYZINE PAMOATE (VISTARIL) 50 MG CAPSULE    Take 50 mg by mouth two (2) times daily as needed for Itching. ONDANSETRON HCL (ZOFRAN, AS HYDROCHLORIDE,) 4 MG TABLET    Take 1 Tab by mouth every six (6) hours as needed for Nausea. PALIPERIDONE (INVEGA) 6 MG SR TABLET    Take 1 Tab by mouth daily. POLYETHYLENE GLYCOL (MIRALAX) 17 GRAM PACKET    Take 1 Packet by mouth daily. TRAZODONE (DESYREL) 50 MG TABLET    Take 1 Tab by mouth nightly. Indications: Insomnia   These Medications have changed    No medications on file   Stop Taking    OMEPRAZOLE DELAYED RELEASE (PRILOSEC D/R) 20 MG TABLET    1 tab daily to BID    PANTOPRAZOLE (PROTONIX) 40 MG TABLET    Take 40 mg by mouth daily.

## 2018-01-19 NOTE — ED TRIAGE NOTES
Pt presented to ED with c/o of lower abdominal pain x 6 months. Pt stated he called PCP today with c/o pain and was told to come ED. Pt stated he has 3 hernias but there is no treatment. Pt has been seen multiple times with same c/o. Pt denies fever, N/V/D. Pt denies SOB and CP.

## 2018-02-21 ENCOUNTER — HOSPITAL ENCOUNTER (OUTPATIENT)
Dept: MAMMOGRAPHY | Age: 48
Discharge: HOME | End: 2018-02-21
Attending: FAMILY MEDICINE
Payer: MEDICAID

## 2018-02-21 ENCOUNTER — HOSPITAL ENCOUNTER (OUTPATIENT)
Dept: ULTRASOUND IMAGING | Age: 48
Discharge: HOME | End: 2018-02-21
Attending: FAMILY MEDICINE
Payer: MEDICAID

## 2018-02-21 DIAGNOSIS — N64.4 BREAST PAIN: ICD-10-CM

## 2018-02-21 DIAGNOSIS — N64.4 MASTODYNIA: ICD-10-CM

## 2018-02-21 PROCEDURE — 77066 DX MAMMO INCL CAD BI: CPT

## 2018-05-23 ENCOUNTER — OFFICE VISIT (OUTPATIENT)
Dept: SURGERY | Age: 48
End: 2018-05-23

## 2018-05-23 VITALS
SYSTOLIC BLOOD PRESSURE: 118 MMHG | DIASTOLIC BLOOD PRESSURE: 80 MMHG | HEIGHT: 71 IN | HEART RATE: 64 BPM | WEIGHT: 268 LBS | TEMPERATURE: 97.9 F | OXYGEN SATURATION: 98 % | BODY MASS INDEX: 37.52 KG/M2 | RESPIRATION RATE: 18 BRPM

## 2018-05-23 DIAGNOSIS — N62 GYNECOMASTIA, MALE: Primary | ICD-10-CM

## 2018-05-23 NOTE — PROGRESS NOTES
Progress Note    Patient: Desiree Santana  MRN: [id number]  SSN: [id number]   YOB: 1970  Age: 50 y.o. Sex: male     Chief Complaint   Patient presents with    Breast Problem     gynecomastia       HPI    Mr. Lukas Thompson is a 46-year-old gentleman on no from previous surgery who presents with gynecomastia and a normal mammogram.  He has no mass oral and has minimal pain today. We discussed gynecomastia and its treatment being symptomatic. I have discussed with him nonsteroidals and warm packs. He will return if he develops a mass. Past Medical History:   Diagnosis Date    Bipolar disorder ([facility]     GERD (gastroesophageal reflux disease)     [facility]     Suicidal behavior      History reviewed. No pertinent surgical history. Allergies   Allergen Reactions    Onion Rash     Current Outpatient Prescriptions   Medication Sig Dispense Refill    famotidine (PEPCID) 40 mg tablet Take 40 mg by mouth daily.  divalproex ER (DEPAKOTE ER) 250 mg ER tablet Take 3 Tabs by mouth nightly. Indications: MIXED BIPOLAR I DISORDER 90 Tab 1    paliperidone (INVEGA) 6 mg SR tablet Take 1 Tab by mouth daily. 30 Tab 1    polyethylene glycol (MIRALAX) 17 gram packet Take 1 Packet by mouth daily. 30 Packet 1    traZODone (DESYREL) 50 mg tablet Take 1 Tab by mouth nightly. Indications: Insomnia 30 Tab 0    gabapentin (NEURONTIN) 400 mg capsule Take 400 mg by mouth two (2) times a day.  hydrOXYzine pamoate (VISTARIL) 50 mg capsule Take 50 mg by mouth two (2) times daily as needed for Itching.  ondansetron hcl (ZOFRAN, AS HYDROCHLORIDE,) 4 mg tablet Take 1 Tab by mouth every six (6) hours as needed for Nausea. 12 Tab 0    doxazosin (CARDURA) 1 mg tablet Take 1 mg by mouth nightly.  CITALOPRAM HYDROBROMIDE (CELEXA PO) Take  by mouth.        Social History     Social History    Marital status: UNKNOWN     Spouse name: N/A    Number of children: N/A    Years of education: N/A Occupational History    Not on file. Social History Main Topics    Smoking status: Current Every Day Smoker    Smokeless tobacco: Never Used    Alcohol use No    Drug use: No    Sexual activity: Not on file     Other Topics Concern    Not on file     Social History Narrative     Family History   Problem Relation Age of Onset    Diabetes Mother          Review of systems:  Patient denies any reflux, emesis, abdominal pain, change in bowel habits, hematochezia, melena, fever, weight loss, fatigue chills, dermatitis, abnormal moles, change in vision, vertigo, epistaxis, dysphagia, hoarseness, chest pain, palpitations, hypertension, edema, cough, shortness of breath, wheezing, hemoptysis, snoring, hematuria, diabetes, thyroid disease, anemia, bruising, history of blood transfusion, dizziness, headache, or fainting. Physical Examination    Well developed well nourished male in no apparent distress  Visit Vitals    /80    Pulse 64    Temp 97.9 °F (36.6 °C) (Oral)    Resp 18    Ht 5' 11\" (1.803 m)    Wt 121.6 kg (268 lb)    SpO2 98%    BMI 37.38 kg/m2      Head: normocephalic, atraumatic  Mouth: Clear, no overt lesions, oral mucosa pink and moist  Neck: supple, no masses, no adenopathy or carotid bruits, trachea midline  Resp: clear to auscultation bilaterally, no wheeze, rhonchi or rales, excursions normal and symmetrical  Cardio: Regular rate and rhythm, no murmurs, clicks, gallops or rubs, no edema or varicosities  Abdomen: soft, nontender, nondistended, normoactive bowel sounds, no hernias, no hepatosplenomegaly,   Back: Deferred  Extremeties: warm, well-perfused, no tenderness or swelling, normal gait/station  Neuro: sensation and strength grossly intact and symmetrical  Psych: alert and oriented to person, place and time  Breast exam bilateral breast exam shows gynecomastia bilaterally left greater than right without skin change or lymphadenopathy.   There is no dominant mass in either breast    IMPRESSION  Gynecomastia    PLAN  No orders of the defined types were placed in this encounter.     follow up as needed  Hallie Garrison MD

## 2018-06-21 ENCOUNTER — OFFICE VISIT (OUTPATIENT)
Dept: FAMILY MEDICINE CLINIC | Age: 48
End: 2018-06-21

## 2018-06-21 ENCOUNTER — HOSPITAL ENCOUNTER (OUTPATIENT)
Dept: LAB | Age: 48
Discharge: HOME | End: 2018-06-21

## 2018-06-21 VITALS
HEART RATE: 63 BPM | HEIGHT: 71 IN | TEMPERATURE: 98.1 F | BODY MASS INDEX: 35.03 KG/M2 | RESPIRATION RATE: 16 BRPM | OXYGEN SATURATION: 98 % | SYSTOLIC BLOOD PRESSURE: 108 MMHG | DIASTOLIC BLOOD PRESSURE: 72 MMHG | WEIGHT: 250.2 LBS

## 2018-06-21 DIAGNOSIS — N64.4 BREAST PAIN IN MALE: ICD-10-CM

## 2018-06-21 DIAGNOSIS — N62 GYNECOMASTIA, MALE: Primary | ICD-10-CM

## 2018-06-21 DIAGNOSIS — Z12.5 SCREENING FOR PROSTATE CANCER: ICD-10-CM

## 2018-06-21 DIAGNOSIS — Z13.89 SCREENING FOR CARDIOVASCULAR, RESPIRATORY, AND GENITOURINARY DISEASES: ICD-10-CM

## 2018-06-21 DIAGNOSIS — Z79.899 CURRENT USE OF PROTON PUMP INHIBITOR: ICD-10-CM

## 2018-06-21 DIAGNOSIS — Z23 ENCOUNTER FOR IMMUNIZATION: ICD-10-CM

## 2018-06-21 DIAGNOSIS — Z13.83 SCREENING FOR CARDIOVASCULAR, RESPIRATORY, AND GENITOURINARY DISEASES: ICD-10-CM

## 2018-06-21 DIAGNOSIS — Z13.6 SCREENING FOR CARDIOVASCULAR, RESPIRATORY, AND GENITOURINARY DISEASES: ICD-10-CM

## 2018-06-21 PROCEDURE — 99001 SPECIMEN HANDLING PT-LAB: CPT | Performed by: NURSE PRACTITIONER

## 2018-06-21 RX ORDER — DIVALPROEX SODIUM 250 MG/1
250 TABLET, DELAYED RELEASE ORAL
COMMUNITY
End: 2018-10-09

## 2018-06-21 RX ORDER — TRAZODONE HYDROCHLORIDE 100 MG/1
100 TABLET ORAL
Refills: 0 | COMMUNITY
Start: 2018-06-01 | End: 2018-10-09

## 2018-06-21 RX ORDER — OMEPRAZOLE 20 MG/1
CAPSULE, DELAYED RELEASE ORAL
Refills: 0 | COMMUNITY
Start: 2018-06-01 | End: 2018-06-21 | Stop reason: ALTCHOICE

## 2018-06-21 RX ORDER — PALIPERIDONE 3 MG/1
TABLET, EXTENDED RELEASE ORAL
Refills: 0 | COMMUNITY
Start: 2018-06-13 | End: 2018-06-21 | Stop reason: ALTCHOICE

## 2018-06-21 RX ORDER — GABAPENTIN 600 MG/1
TABLET ORAL
Refills: 0 | COMMUNITY
Start: 2018-06-01 | End: 2018-10-09

## 2018-06-21 RX ORDER — MELOXICAM 15 MG/1
15 TABLET ORAL
Qty: 30 TAB | Refills: 1 | Status: SHIPPED | OUTPATIENT
Start: 2018-06-21 | End: 2018-10-09

## 2018-06-21 RX ORDER — HYDROXYZINE 50 MG/1
50 TABLET, FILM COATED ORAL
Refills: 0 | COMMUNITY
Start: 2018-06-01 | End: 2018-10-09

## 2018-06-21 RX ORDER — PANTOPRAZOLE SODIUM 40 MG/1
TABLET, DELAYED RELEASE ORAL
Refills: 0 | COMMUNITY
Start: 2018-06-01 | End: 2018-10-09

## 2018-06-21 NOTE — MR AVS SNAPSHOT
[address] [place] [address] 
 
 
 [address] [address] [address] [facility] [address] 
[phone] Patient: Fernando Christianson MRN: [id number] Visit Information Date & Time Provider Department Dept. Phone Encounter #  
 6/21/2018  8:30 AM [facility] [phone] [id number] Follow-up Instructions Return in about 4 weeks (around 7/19/2018) for pain since change in medication . Upcoming Health Maintenance Date Due Pneumococcal 19-64 Medium Risk (1 of 1 - PPSV23) 10/4/2018* Influenza Age 5 to Adult 8/1/2018 DTaP/Tdap/Td series (2 - Td) 6/21/2028 *Topic was postponed. The date shown is not the original due date. Allergies as of 6/21/2018  Review Complete On: 6/21/2018 By: Radha Burton NP Severity Noted Reaction Type Reactions Onion  12/30/2016    Rash Current Immunizations  Reviewed on 11/1/2017 Name Date Influenza Vaccine 10/1/2017 Tdap 6/21/2018 Not reviewed this visit You Were Diagnosed With   
  
 Codes Comments Gynecomastia, male    -  Primary ICD-10-CM: N62 
ICD-9-CM: 611.1 Encounter for immunization     ICD-10-CM: B95 ICD-9-CM: V03.89 Screening for cardiovascular, respiratory, and genitourinary diseases     ICD-10-CM: Z13.6, Z13.89, Z13.83 ICD-9-CM: V81.2, V81.6, V81.4 Current use of proton pump inhibitor     ICD-10-CM: I14.598 ICD-9-CM: V58.69 Screening for prostate cancer     ICD-10-CM: Z12.5 ICD-9-CM: V76.44 Breast pain in male     ICD-10-CM: N64.4 ICD-9-CM: 611.71 Vitals BP Pulse Temp Resp Height(growth percentile) Weight(growth percentile) 108/72 (BP 1 Location: Left arm, BP Patient Position: Sitting) 63 98.1 °F (36.7 °C) (Oral) 16 5' 11\" (1.803 m) 250 lb 3.2 oz (113.5 kg) SpO2 BMI Smoking Status 98% 34.9 kg/m2 Current Every Day Smoker Vitals History BMI and BSA Data Body Mass Index Body Surface Area 34.9 kg/m 2 2.38 m 2 Preferred Pharmacy Pharmacy Name Phone [phone], [address] [phone] Your Updated Medication List  
  
   
This list is accurate as of 6/21/18  8:45 AM.  Always use your most recent med list.  
  
  
  
  
 DEPAKOTE 250 mg tablet Generic drug:  divalproex DR Take 250 mg by mouth nightly. Indications: 3 tabs  
  
 gabapentin 600 mg tablet Commonly known as:  NEURONTIN  
take 1 tablet by mouth twice a day  
  
 hydrOXYzine HCl 50 mg tablet Commonly known as:  ATARAX Take 50 mg by mouth nightly. meloxicam 15 mg tablet Commonly known as:  MOBIC Take 1 Tab by mouth daily as needed for Pain.  
  
 pantoprazole 40 mg tablet Commonly known as:  PROTONIX  
take 1 tablet by mouth at bedtime  
  
 traZODone 100 mg tablet Commonly known as:   Screven Take 100 mg by mouth nightly. Prescriptions Sent to Pharmacy Refills  
 meloxicam (MOBIC) 15 mg tablet 1 Sig: Take 1 Tab by mouth daily as needed for Pain. Class: Normal  
 Pharmacy: [id number] [address] [place] [address], [facility] Ph #: [phone] Route: Oral  
  
We Performed the Following TETANUS, DIPHTHERIA TOXOIDS AND ACELLULAR PERTUSSIS VACCINE (TDAP), IN INDIVIDS. >=7, IM [id number] CPT(R)] Follow-up Instructions Return in about 4 weeks (around 7/19/2018) for pain since change in medication . To-Do List   
 06/21/2018 Lab:  HEMOGLOBIN A1C WITH EAG   
  
 06/21/2018 Lab:  LIPID PANEL   
  
 06/21/2018 Lab:  MAGNESIUM   
  
 06/21/2018 Lab:  METABOLIC PANEL, COMPREHENSIVE   
  
 06/21/2018 Lab:  PSA, TOTAL &  FREE   
  
 06/21/2018 Lab:  TSH 3RD GENERATION Patient Instructions A Healthy Lifestyle: Care Instructions Your Care Instructions A healthy lifestyle can help you feel good, stay at a healthy weight, and have plenty of energy for both work and play. A healthy lifestyle is something you can share with your whole family. A healthy lifestyle also can lower your risk for serious health problems, such as high blood pressure, heart disease, and diabetes. You can follow a few steps listed below to improve your health and the health of your family. Follow-up care is a key part of your treatment and safety. Be sure to make and go to all appointments, and call your doctor if you are having problems. It's also a good idea to know your test results and keep a list of the medicines you take. How can you care for yourself at home? · Do not eat too much sugar, fat, or fast foods. You can still have dessert and treats now and then. The goal is moderation. · Start small to improve your eating habits. Pay attention to portion sizes, drink less juice and soda pop, and eat more fruits and vegetables. ¨ Eat a healthy amount of food. A 3-ounce serving of meat, for example, is about the size of a deck of cards. Fill the rest of your plate with vegetables and whole grains. ¨ Limit the amount of soda and sports drinks you have every day. Drink more water when you are thirsty. ¨ Eat at least 5 servings of fruits and vegetables every day. It may seem like a lot, but it is not hard to reach this goal. A serving or helping is 1 piece of fruit, 1 cup of vegetables, or 2 cups of leafy, raw vegetables. Have an apple or some carrot sticks as an afternoon snack instead of a candy bar. Try to have fruits and/or vegetables at every meal. 
· Make exercise part of your daily routine. You may want to start with simple activities, such as walking, bicycling, or slow swimming. Try to be active 30 to 60 minutes every day. You do not need to do all 30 to 60 minutes all at once. For example, you can exercise 3 times a day for 10 or 20 minutes.  Moderate exercise is safe for most people, but it is always a good idea to talk to your doctor before starting an exercise program. 
· Keep moving. Isabel Banchikis the lawn, work in the garden, or [employer]. Take the stairs instead of the elevator at work. · If you smoke, quit. People who smoke have an increased risk for heart attack, stroke, cancer, and other lung illnesses. Quitting is hard, but there are ways to boost your chance of quitting tobacco for good. ¨ Use nicotine gum, patches, or lozenges. ¨ Ask your doctor about stop-smoking programs and medicines. ¨ Keep trying. In addition to reducing your risk of diseases in the future, you will notice some benefits soon after you stop using tobacco. If you have shortness of breath or asthma symptoms, they will likely get better within a few weeks after you quit. · Limit how much alcohol you drink. Moderate amounts of alcohol (up to 2 drinks a day for men, 1 drink a day for women) are okay. But drinking too much can lead to liver problems, high blood pressure, and other health problems. Family health If you have a family, there are many things you can do together to improve your health. · Eat meals together as a family as often as possible. · Eat healthy foods. This includes fruits, vegetables, lean meats and dairy, and whole grains. · Include your family in your fitness plan. Most people think of activities such as jogging or tennis as the way to fitness, but there are many ways you and your family can be more active. Anything that makes you breathe hard and gets your heart pumping is exercise. Here are some tips: 
¨ Walk to do errands or to take your child to school or the bus. ¨ Go for a family bike ride after dinner instead of watching TV. Where can you learn more? Go to http://martha-margaret.info/. Enter T241 in the search box to learn more about \"A Healthy Lifestyle: Care Instructions. \" Current as of: May 12, 2017 Content Version: 11.4 © 4204-0916 Healthwise, Incorporated. Care instructions adapted under license by [employer] (which disclaims liability or warranty for this information). If you have questions about a medical condition or this instruction, always ask your healthcare professional. [address] any warranty or liability for your use of this information. Introducing \[facility]\"" & HEALTH SERVICES! [employer] introduces [employer] patient portal. Now you can access parts of your medical record, email your doctor's office, and request medication refills online. 1. In your internet browser, go to [email or web address] 2. Click on the First Time User? Click Here link in the Sign In box. You will see the New Member Sign Up page. 3. Enter your [employer] Access Code exactly as it appears below. You will not need to use this code after youve completed the sign-up process. If you do not sign up before the expiration date, you must request a new code. · [employer] Access Code: [email or web address] Expires: 9/19/2018  7:52 AM 
 
4. Enter the last four digits of your Social Security Number (xxxx) and Date of Birth (mm/dd/yyyy) as indicated and click Submit. You will be taken to the next sign-up page. 5. Create a [employer] ID. This will be your [employer] login ID and cannot be changed, so think of one that is secure and easy to remember. 6. Create a [employer] password. You can change your password at any time. 7. Enter your Password Reset Question and Answer. This can be used at a later time if you forget your password. 8. Enter your e-mail address. You will receive e-mail notification when new information is available in [address] 9. Click Sign Up. You can now view and download portions of your medical record. 10. Click the Download Summary menu link to download a portable copy of your medical information.  
 
If you have questions, please visit the Frequently Asked Questions section of the [employer]. Remember, [employer] is NOT to be used for urgent needs. For medical emergencies, dial 911. Now available from your iPhone and Android! Please provide this summary of care documentation to your next provider. Your primary care clinician is listed as Andra Mauricio. If you have any questions after today's visit, please call [phone].

## 2018-06-21 NOTE — PROGRESS NOTES
Chief Complaint   Patient presents with   [facility].     Patient is here today as a New Patient.

## 2018-06-21 NOTE — PATIENT INSTRUCTIONS

## 2018-06-21 NOTE — PROGRESS NOTES
HISTORY OF PRESENT ILLNESS    Vane Salas is a 50y.o. year old male who comes in today as a new patient to our practice to be seen for: left sided breast pain    HPI:     Patients symptoms have been present for 2 years. Pain level 8/10 left breast,  It is described as hurting to touch and also to lay on the affected side. It worsen with increased pain. Patient has tried:  Taking ibuprofen 800 mg without improvement. States he has had a mammogram and informed he had gynecomastia. Comments he was seen by the surgeon and per patient was informed he didn't need surgery at this time and keep an eye on the area. Also advised to use heat, comments he did try heat without improvement. Patient also reports he would like to have prostate and diabetes screening. Current Outpatient Prescriptions   Medication Sig Dispense Refill    gabapentin (NEURONTIN) 600 mg tablet take 1 tablet by mouth twice a day  0    hydrOXYzine HCl (ATARAX) 50 mg tablet Take 50 mg by mouth nightly. 0    pantoprazole (PROTONIX) 40 mg tablet take 1 tablet by mouth at bedtime  0    divalproex DR (DEPAKOTE) 250 mg tablet Take 250 mg by mouth nightly. Indications: 3 tabs      traZODone (DESYREL) 100 mg tablet Take 100 mg by mouth nightly.   0     Past Medical History:   Diagnosis Date    Bipolar disorder ([address]     GERD (gastroesophageal reflux disease)     Schizophrenia ([facility]     Suicidal behavior      Family History   Problem Relation Age of Onset    Diabetes Mother      Social History     Social History    Marital status: UNKNOWN     Spouse name: N/A    Number of children: N/A    Years of education: N/A     Social History Main Topics    Smoking status: Current Every Day Smoker    Smokeless tobacco: Never Used    Alcohol use No    Drug use: No    Sexual activity: Not on file     Other Topics Concern    Not on file     Social History Narrative       ROS:  Review of Systems - General ROS: negative for - chills or fever  Endocrine ROS: positive for - malaise/lethargy and polydipsia/polyuria  Breast ROS: positive for - left sided breast pain   Respiratory ROS: no cough, shortness of breath, or wheezing  Cardiovascular ROS: no chest pain or dyspnea on exertion  Gastrointestinal ROS: positive for - abdominal pain, change in bowel habits and heartburn      Objective:  Visit Vitals    /72 (BP 1 Location: Left arm, BP Patient Position: Sitting)    Pulse 63    Temp 98.1 °F (36.7 °C) (Oral)    Resp 16    Ht 5' 11\" (1.803 m)    Wt 250 lb 3.2 oz (113.5 kg)    SpO2 98%    BMI 34.9 kg/m2     General appearance - alert, well appearing, and in no distress  Neck - supple, no significant adenopathy  Chest - clear to auscultation, no wheezes, rales or rhonchi, symmetric air entry  Heart - normal rate, regular rhythm, normal S1, S2, no murmurs, rubs, clicks or gallops  Abdomen: soft, non-tender. Bowel sounds normal. No masses,  no organomegaly, abnormal findings:  tenderness mild in the epigastrium  Extremities: extremities normal, atraumatic, no cyanosis or edema  Breasts: breasts appear normal, no suspicious masses, no skin or nipple changes or axillary nodes, generalized tenderness on left        Assessment/Plan:     ICD-10-CM ICD-9-CM    1. Gynecomastia, male N62 611.1    2. Encounter for immunization Z23 V03.89 TETANUS, DIPHTHERIA TOXOIDS AND ACELLULAR PERTUSSIS VACCINE (TDAP), IN INDIVIDS. >=7, IM   3. Screening for cardiovascular, respiratory, and genitourinary diseases Z13.6 V81.2 LIPID PANEL    Z13.89 V81.6 HEMOGLOBIN A1C WITH EAG    Q36.37 V54.4 METABOLIC PANEL, COMPREHENSIVE      TSH 3RD GENERATION      LIPID PANEL      HEMOGLOBIN A1C WITH EAG      METABOLIC PANEL, COMPREHENSIVE      TSH 3RD GENERATION   4. Current use of proton pump inhibitor Z79.899 V58.69 MAGNESIUM      MAGNESIUM   5. Screening for prostate cancer Z12.5 V76.44 PSA, TOTAL &  FREE      PSA, TOTAL &  FREE   6.  Breast pain in male N64.4 611.71 meloxicam (MOBIC) 15 mg tablet   reinforced use of warm compress to area and begin mobic as above for pain   I have discussed the diagnosis with the patient and the intended plan as seen in the above orders. The patient has received an after-visit summary and questions were answered concerning future plans. I have discussed medication side effects and warnings with the patient as well. Follow-up Disposition:  Return in about 4 weeks (around 7/19/2018) for pain since change in medication .

## 2018-06-21 NOTE — LETTER
6/22/2018 2:49 PM 
 
Mr. Abdi Britton [address] [address] Dear Abdi Britton I've reviewed your test results as listed below. Results are abnormal unless otherwise noted. Lipids: Total Cholesterol: NORMAL    
       HDL (good Cholesterol): NORMAL    
       LDL (bad Cholesterol): ABNORMAL Triglycerides (bad Cholesterol): NORMAL Kidney function is slightly decreased but stable Please call me if you have any questions: [phone] Sincerely, 
 
 
Jose Landa NP

## 2018-06-22 LAB
ALBUMIN SERPL-MCNC: 4.5 G/DL (ref 3.5–5.5)
ALBUMIN/GLOB SERPL: 1.7 {RATIO} (ref 1.2–2.2)
ALP SERPL-CCNC: 110 IU/L (ref 39–117)
ALT SERPL-CCNC: 18 IU/L (ref 0–44)
AST SERPL-CCNC: 21 IU/L (ref 0–40)
BILIRUB SERPL-MCNC: 0.6 MG/DL (ref 0–1.2)
BUN SERPL-MCNC: 13 MG/DL (ref 6–24)
BUN/CREAT SERPL: 8 (ref 9–20)
CALCIUM SERPL-MCNC: 9.8 MG/DL (ref 8.7–10.2)
CHLORIDE SERPL-SCNC: 102 MMOL/L (ref 96–106)
CHOLEST SERPL-MCNC: 201 MG/DL (ref 100–199)
CO2 SERPL-SCNC: 23 MMOL/L (ref 20–29)
CREAT SERPL-MCNC: 1.55 MG/DL (ref 0.76–1.27)
EST. AVERAGE GLUCOSE BLD GHB EST-MCNC: 105 MG/DL
GFR SERPLBLD CREATININE-BSD FMLA CKD-EPI: 52 ML/MIN/1.73
GFR SERPLBLD CREATININE-BSD FMLA CKD-EPI: 60 ML/MIN/1.73
GLOBULIN SER CALC-MCNC: 2.7 G/DL (ref 1.5–4.5)
GLUCOSE SERPL-MCNC: 85 MG/DL (ref 65–99)
HBA1C MFR BLD: 5.3 % (ref 4.8–5.6)
HDLC SERPL-MCNC: 46 MG/DL
LDLC SERPL CALC-MCNC: 137 MG/DL (ref 0–99)
MAGNESIUM SERPL-MCNC: 2 MG/DL (ref 1.6–2.3)
POTASSIUM SERPL-SCNC: 5 MMOL/L (ref 3.5–5.2)
PROT SERPL-MCNC: 7.2 G/DL (ref 6–8.5)
PSA FREE MFR SERPL: 17.1 %
PSA FREE SERPL-MCNC: 0.12 NG/ML
PSA SERPL-MCNC: 0.7 NG/ML (ref 0–4)
SODIUM SERPL-SCNC: 139 MMOL/L (ref 134–144)
TRIGL SERPL-MCNC: 91 MG/DL (ref 0–149)
TSH SERPL DL<=0.005 MIU/L-ACNC: 1.04 UIU/ML (ref 0.45–4.5)
VLDLC SERPL CALC-MCNC: 18 MG/DL (ref 5–40)

## 2018-06-22 NOTE — PROGRESS NOTES
Please advise patient his total cholesterol and LDL are elevated, he needs to decrease his intake of cholesterol/fatty foods. Also his kidney function is slightly decreased but has been stable over the last year. I will monitor this closely.   Thanks, Do Meredith, ADRIANE

## 2018-06-22 NOTE — PROGRESS NOTES
I called Pt in regards to lab results. Home# and Mobile# are D/C. Letter sent  In regards to lab results.

## 2018-10-04 ENCOUNTER — HOSPITAL ENCOUNTER (INPATIENT)
Age: 48
LOS: 5 days | Discharge: HOME | DRG: 750 | End: 2018-10-09
Attending: EMERGENCY MEDICINE | Admitting: PSYCHIATRY & NEUROLOGY
Payer: MEDICAID

## 2018-10-04 DIAGNOSIS — R45.851 SUICIDAL IDEATION: Primary | ICD-10-CM

## 2018-10-04 PROBLEM — F32.A DEPRESSION: Status: ACTIVE | Noted: 2018-10-04

## 2018-10-04 LAB
ALBUMIN SERPL-MCNC: 3.6 G/DL (ref 3.4–5)
ALBUMIN/GLOB SERPL: 1 {RATIO} (ref 0.8–1.7)
ALP SERPL-CCNC: 112 U/L (ref 45–117)
ALT SERPL-CCNC: 25 U/L (ref 16–61)
AMPHET UR QL SCN: NEGATIVE
ANION GAP SERPL CALC-SCNC: 9 MMOL/L (ref 3–18)
APAP SERPL-MCNC: <2 UG/ML (ref 10–30)
APPEARANCE UR: ABNORMAL
APPEARANCE UR: CLEAR
AST SERPL-CCNC: 17 U/L (ref 15–37)
ATRIAL RATE: 66 BPM
BARBITURATES UR QL SCN: NEGATIVE
BASOPHILS # BLD: 0 K/UL (ref 0–0.1)
BASOPHILS NFR BLD: 0 % (ref 0–2)
BENZODIAZ UR QL: NEGATIVE
BILIRUB SERPL-MCNC: 0.8 MG/DL (ref 0.2–1)
BILIRUB UR QL: ABNORMAL
BILIRUB UR QL: NEGATIVE
BUN SERPL-MCNC: 16 MG/DL (ref 7–18)
BUN/CREAT SERPL: 12 (ref 12–20)
CALCIUM SERPL-MCNC: 9.3 MG/DL (ref 8.5–10.1)
CALCULATED P AXIS, ECG09: 31 DEGREES
CALCULATED R AXIS, ECG10: 20 DEGREES
CALCULATED T AXIS, ECG11: 15 DEGREES
CANNABINOIDS UR QL SCN: NEGATIVE
CHLORIDE SERPL-SCNC: 108 MMOL/L (ref 100–108)
CO2 SERPL-SCNC: 27 MMOL/L (ref 21–32)
COCAINE UR QL SCN: POSITIVE
COLOR UR: ABNORMAL
COLOR UR: YELLOW
CREAT SERPL-MCNC: 1.39 MG/DL (ref 0.6–1.3)
DIAGNOSIS, 93000: NORMAL
DIFFERENTIAL METHOD BLD: ABNORMAL
EOSINOPHIL # BLD: 0.3 K/UL (ref 0–0.4)
EOSINOPHIL NFR BLD: 4 % (ref 0–5)
ERYTHROCYTE [DISTWIDTH] IN BLOOD BY AUTOMATED COUNT: 15 % (ref 11.6–14.5)
ETHANOL SERPL-MCNC: <3 MG/DL (ref 0–3)
GLOBULIN SER CALC-MCNC: 3.6 G/DL (ref 2–4)
GLUCOSE SERPL-MCNC: 94 MG/DL (ref 74–99)
GLUCOSE UR STRIP.AUTO-MCNC: ABNORMAL MG/DL
GLUCOSE UR STRIP.AUTO-MCNC: NEGATIVE MG/DL
HCT VFR BLD AUTO: 40 % (ref 36–48)
HDSCOM,HDSCOM: ABNORMAL
HGB BLD-MCNC: 14.4 G/DL (ref 13–16)
HGB UR QL STRIP: ABNORMAL
HGB UR QL STRIP: NEGATIVE
KETONES UR QL STRIP.AUTO: ABNORMAL MG/DL
KETONES UR QL STRIP.AUTO: NEGATIVE MG/DL
LEUKOCYTE ESTERASE UR QL STRIP.AUTO: ABNORMAL
LEUKOCYTE ESTERASE UR QL STRIP.AUTO: NEGATIVE
LYMPHOCYTES # BLD: 1.9 K/UL (ref 0.9–3.6)
LYMPHOCYTES NFR BLD: 25 % (ref 21–52)
MCH RBC QN AUTO: 29.1 PG (ref 24–34)
MCHC RBC AUTO-ENTMCNC: 36 G/DL (ref 31–37)
MCV RBC AUTO: 80.8 FL (ref 74–97)
METHADONE UR QL: NEGATIVE
MONOCYTES # BLD: 0.7 K/UL (ref 0.05–1.2)
MONOCYTES NFR BLD: 9 % (ref 3–10)
NEUTS SEG # BLD: 4.7 K/UL (ref 1.8–8)
NEUTS SEG NFR BLD: 62 % (ref 40–73)
NITRITE UR QL STRIP.AUTO: ABNORMAL
NITRITE UR QL STRIP.AUTO: NEGATIVE
OPIATES UR QL: NEGATIVE
P-R INTERVAL, ECG05: 154 MS
PCP UR QL: NEGATIVE
PH UR STRIP: 6 [PH] (ref 5–8)
PH UR STRIP: ABNORMAL [PH] (ref 5–8)
PLATELET # BLD AUTO: 201 K/UL (ref 135–420)
PMV BLD AUTO: 10.4 FL (ref 9.2–11.8)
POTASSIUM SERPL-SCNC: 3.9 MMOL/L (ref 3.5–5.5)
PROT SERPL-MCNC: 7.2 G/DL (ref 6.4–8.2)
PROT UR STRIP-MCNC: ABNORMAL MG/DL
PROT UR STRIP-MCNC: NEGATIVE MG/DL
Q-T INTERVAL, ECG07: 398 MS
QRS DURATION, ECG06: 80 MS
QTC CALCULATION (BEZET), ECG08: 417 MS
RBC # BLD AUTO: 4.95 M/UL (ref 4.7–5.5)
SALICYLATES SERPL-MCNC: <2.8 MG/DL (ref 2.8–20)
SODIUM SERPL-SCNC: 144 MMOL/L (ref 136–145)
SP GR UR REFRACTOMETRY: 1.01 (ref 1–1.03)
SP GR UR REFRACTOMETRY: ABNORMAL (ref 1–1.03)
SP GR UR REFRACTOMETRY: ABNORMAL (ref 1–1.03)
UROBILINOGEN UR QL STRIP.AUTO: 1 EU/DL (ref 0.2–1)
UROBILINOGEN UR QL STRIP.AUTO: ABNORMAL EU/DL (ref 0.2–1)
VALPROATE SERPL-MCNC: 34 UG/ML (ref 50–100)
VENTRICULAR RATE, ECG03: 66 BPM
WBC # BLD AUTO: 7.6 K/UL (ref 4.6–13.2)

## 2018-10-04 PROCEDURE — 80164 ASSAY DIPROPYLACETIC ACD TOT: CPT | Performed by: EMERGENCY MEDICINE

## 2018-10-04 PROCEDURE — 80307 DRUG TEST PRSMV CHEM ANLYZR: CPT | Performed by: EMERGENCY MEDICINE

## 2018-10-04 PROCEDURE — 65220000005 HC RM SEMIPRIVATE PSYCH 3 OR 4 BED

## 2018-10-04 PROCEDURE — 93005 ELECTROCARDIOGRAM TRACING: CPT

## 2018-10-04 PROCEDURE — 81003 URINALYSIS AUTO W/O SCOPE: CPT | Performed by: EMERGENCY MEDICINE

## 2018-10-04 PROCEDURE — 80053 COMPREHEN METABOLIC PANEL: CPT | Performed by: EMERGENCY MEDICINE

## 2018-10-04 PROCEDURE — 85025 COMPLETE CBC W/AUTO DIFF WBC: CPT | Performed by: EMERGENCY MEDICINE

## 2018-10-04 PROCEDURE — 99285 EMERGENCY DEPT VISIT HI MDM: CPT

## 2018-10-04 PROCEDURE — 74011250637 HC RX REV CODE- 250/637: Performed by: EMERGENCY MEDICINE

## 2018-10-04 RX ORDER — LORAZEPAM 1 MG/1
1 TABLET ORAL
Status: DISCONTINUED | OUTPATIENT
Start: 2018-10-04 | End: 2018-10-09 | Stop reason: HOSPADM

## 2018-10-04 RX ORDER — HALOPERIDOL 5 MG/1
5 TABLET ORAL
Status: DISCONTINUED | OUTPATIENT
Start: 2018-10-04 | End: 2018-10-09 | Stop reason: HOSPADM

## 2018-10-04 RX ORDER — HALOPERIDOL 5 MG/ML
5 INJECTION INTRAMUSCULAR
Status: DISCONTINUED | OUTPATIENT
Start: 2018-10-04 | End: 2018-10-05

## 2018-10-04 RX ORDER — IBUPROFEN 400 MG/1
400 TABLET ORAL
Status: DISCONTINUED | OUTPATIENT
Start: 2018-10-04 | End: 2018-10-09 | Stop reason: HOSPADM

## 2018-10-04 RX ORDER — PANTOPRAZOLE SODIUM 40 MG/1
40 TABLET, DELAYED RELEASE ORAL
Status: COMPLETED | OUTPATIENT
Start: 2018-10-04 | End: 2018-10-04

## 2018-10-04 RX ORDER — LORAZEPAM 2 MG/ML
2 INJECTION INTRAMUSCULAR
Status: DISCONTINUED | OUTPATIENT
Start: 2018-10-04 | End: 2018-10-09 | Stop reason: HOSPADM

## 2018-10-04 RX ADMIN — PANTOPRAZOLE SODIUM 40 MG: 40 TABLET, DELAYED RELEASE ORAL at 11:03

## 2018-10-04 NOTE — ED NOTES
Patient laying in bed alert and oriented x3, no signs of distress noted.  Sitter at bedside Will continue to monitor,

## 2018-10-04 NOTE — IP AVS SNAPSHOT
[address] 66 Patient: Chasity Cardoso MRN: [id number] OYE:[birth year] About your hospitalization You were admitted on:  October 4, 2018 You last received care in the:  [facility] 1 ADULT CHEM DEP You were discharged on:  October 9, 2018 Why you were hospitalized Your primary diagnosis was:  Bipolar 1 Disorder (Hcc) Your diagnoses also included:  Depression, Cocaine Abuse With Cocaine-Induced Mood Disorder (Hcc) Follow-up Information Follow up With Details Comments Contact Info [address] [place]   [address] [place]  [address] [address] 
[phone] The patient has a appointment at [address] [place] [address] on 10/26/18 at 8:20 am with Dr. Marcy Blue. The address and contact number is [address] [place] [address]; Phone: [phone]. Numbers to remember [facility] 36 Emergency Services [phone] Suicide Prevention [phone](talk) The patient has a appointment at [address] [place] [address] on 10/26/18 at 8:20 am with Dr. Marcy Blue. The address and contact number is [address] [place] [address]; Phone: [phone]. Discharge Orders None A check mariza indicates which time of day the medication should be taken. My Medications START taking these medications Instructions Each Dose to Equal  
 Morning Noon Evening Bedtime ARIPiprazole 10 mg tablet Commonly known as:  ABILIFY Start taking on:  10/10/2018 Your last dose was: Your next dose is: Take 1 Tab by mouth daily. Indications: Bipolar illness. 10 mg  
    
   
   
   
  
 divalproex  mg ER tablet Commonly known as:  DEPAKOTE ER  
Replaces:  DEPAKOTE 250 mg tablet Your last dose was: Your next dose is: Take 2 Tabs by mouth nightly. Indications: Bipolar disorder. 1000 mg  
    
   
   
   
  
 famotidine 40 mg tablet Commonly known as:  PEPCID Your last dose was: Your next dose is: Take 1 Tab by mouth two (2) times a day. Indications: HEARTBURN PREVENTION  
 40 mg CHANGE how you take these medications Instructions Each Dose to Equal  
 Morning Noon Evening Bedtime  
 traZODone 100 mg tablet Commonly known as:  Martín Mendez What changed:  how much to take Your last dose was: Your next dose is: Take 2 Tabs by mouth nightly. Indications: insomnia. 200 mg  
    
   
   
   
  
  
STOP taking these medications DEPAKOTE 250 mg tablet Generic drug:  divalproex DR Replaced by:  divalproex  mg ER tablet  
   
  
 gabapentin 600 mg tablet Commonly known as:  NEURONTIN  
   
  
 hydrOXYzine HCl 50 mg tablet Commonly known as:  ATARAX  
   
  
 meloxicam 15 mg tablet Commonly known as:  MOBIC  
   
  
 pantoprazole 40 mg tablet Commonly known as:  PROTONIX Where to Get Your Medications These medications were sent to [address] [place] [address] [place] [address] Phone:  [phone]  
  traZODone 100 mg tablet Information on where to get these meds will be given to you by the nurse or doctor. ! Ask your nurse or doctor about these medications ARIPiprazole 10 mg tablet  
 divalproex  mg ER tablet  
 famotidine 40 mg tablet Discharge Instructions BEHAVIORAL HEALTH NURSING DISCHARGE NOTE The following personal items collected during your admission are returned to you:  
Dental Appliance: Dental Appliances: None Vision: Visual Aid: Glasses (did not bring to hospital) Hearing Aid:   
Jewelry: Jewelry: None Clothing: Other Valuables: Other Valuables:  (Glasses, wallet,Boots Hat.sweat-Pants) Valuables sent to safe: Personal Items Sent to Safe:  (Birthcertitificate,social security,debit,2 healthcards ,id) PATIENT INSTRUCTIONS: 
 
 
Follow-up The patient has a appointment at [address] [place] [address] on 10/26/18 at 8:20 am with Dr. Christelle Lemus. The address and contact number is [address] [place] [address], [address] [place] [address] Street; Phone: [phone]. Numbers to remember [facility] [phone] Suicide Prevention [phone](talk) The discharge information has been reviewed with the patient. The patient verbalized understanding. Pensqr Activation Thank you for requesting access to Pensqr. Please follow the instructions below to securely access and download your online medical record. Pensqr allows you to send messages to your doctor, view your test results, renew your prescriptions, schedule appointments, and more. How Do I Sign Up? 1. In your internet browser, go to [email or web address] 
2. Click on the First Time User? Click Here link in the Sign In box. You will be redirect to the New Member Sign Up page. 3. Enter your Pensqr Access Code exactly as it appears below. You will not need to use this code after youve completed the sign-up process. If you do not sign up before the expiration date, you must request a new code. Pensqr Access Code: [email or web address] Expires: 2019  9:25 AM (This is the date your Pensqr access code will ) 4. Enter the last four digits of your Social Security Number (xxxx) and Date of Birth (mm/dd/yyyy) as indicated and click Submit. You will be taken to the next sign-up page. 5. Create a Pensqr ID. This will be your Pensqr login ID and cannot be changed, so think of one that is secure and easy to remember. 6. Create a Pensqr password. You can change your password at any time. 7. Enter your Password Reset Question and Answer. This can be used at a later time if you forget your password. 8. Enter your e-mail address. You will receive e-mail notification when new information is available in [address] 9. Click Sign Up. You can now view and download portions of your medical record. 10. Click the Download Summary menu link to download a portable copy of your medical information. Additional Information If you have questions, please visit the Frequently Asked Questions section of the [employer] website at [email or web address] Remember, [employer] is NOT to be used for urgent needs. For medical emergencies, dial 911. Patient armband removed and shredded Introducing [facility]! [address] introduces Epi[employer]ax patient portal. Now you can access parts of your medical record, email your doctor's office, and request medication refills online. 1. In your internet browser, go to [email or web address] 2. Click on the First Time User? Click Here link in the Sign In box. You will see the New Member Sign Up page. 3. Enter your E[employer]ax Access Code exactly as it appears below. You will not need to use this code after youve completed the sign-up process. If you do not sign up before the expiration date, you must request a new code. · [employer] Access Code: [email or web address] Expires: 1/2/2019  9:25 AM 
 
4. Enter the last four digits of your Social Security Number (xxxx) and Date of Birth (mm/dd/yyyy) as indicated and click Submit. You will be taken to the next sign-up page. 5. Create a EpiVax ID. This will be your EpiVax login ID and cannot be changed, so think of one that is secure and easy to remember. 6. Create a E[employer]ax password. You can change your password at any time. 7. Enter your Password Reset Question and Answer. This can be used at a later time if you forget your password. 8. Enter your e-mail address. You will receive e-mail notification when new information is available in [address] 9. Click Sign Up. You can now view and download portions of your medical record. 10. Click the Download Summary menu link to download a portable copy of your medical information. If you have questions, please visit the Frequently Asked Questions section of the [employer] website. Remember, [employer] is NOT to be used for urgent needs. For medical emergencies, dial 911. Now available from your iPhone and Android! Introducing Fabrice Ruiz As a [employer] patient, I wanted to make you aware of our electronic visit tool called Fabrice Ruiz. [employer] 24/7 allows you to connect within minutes with a medical provider 24 hours a day, seven days a week via a mobile device or tablet or logging into a secure website from your computer. You can access Fabrice Ruiz from anywhere in the [place]. A virtual visit might be right for you when you have a simple condition and feel like you just dont want to get out of bed, or cant get away from work for an appointment, when your regular [employer] provider is not available (evenings, weekends or holidays), or when youre out of town and need minor care. Electronic visits cost only $49 and if the [employer] 24/7 provider determines a prescription is needed to treat your condition, one can be electronically transmitted to a nearby pharmacy*. Please take a moment to enroll today if you have not already done so. The enrollment process is free and takes just a few minutes. To enroll, please download the [employer] 24/7 sergio to your tablet or phone, or visit [email or web address] to enroll on your computer.    
And, as an [address] [place] [address] patient with a whodoyou account, the results of your visits will be scanned into your electronic medical record and your primary care provider will be able to view the scanned results. We urge you to continue to see your regular [facility] provider for your ongoing medical care. And while your primary care provider may not be the one available when you seek a Fabrice Keefin virtual visit, the peace of mind you get from getting a real diagnosis real time can be priceless. For more information on [employer], view our Frequently Asked Questions (FAQs) at [email or web address]. Sincerely, 
 
Jose Cornell MD 
Chief Medical Officer Amara Zamora *:  certain medications cannot be prescribed via [employer] Providers Seen During Your Hospitalization Provider Specialty Primary office phone [facility] Emergency Medicine [phone] Amaya Abreu MD Psychiatry [phone] Rosalinda Sanabria MD Psychiatry [phone] Your Primary Care Physician (PCP) Primary Care Physician Office Phone Office Fax Gray Yoanna, [address] [place] [address] [phone] You are allergic to the following Allergen Reactions Onion Rash Recent Documentation Smoking Status Current Every Day Smoker Emergency Contacts Name Discharge Info Relation Home Work Mobile [address] CAREGIVER [3] Mother [14] [phone] [facility]  Parent [1] [phone] Patient Belongings The following personal items are in your possession at time of discharge: 
  Dental Appliances: None  Visual Aid: Glasses (did not bring to hospital)      Home Medications: None   Jewelry: None       Other Valuables:  (Glasses, wallet,Boots Hat.sweat-Pants)  Personal Items Sent to Safe:  (Birthcertitificate,social security,debit,2 healthcards ,id) Please provide this summary of care documentation to your next provider. Signatures-by signing, you are acknowledging that this After Visit Summary has been reviewed with you and you have received a copy. Patient Signature:  ____________________________________________________________ Date:  ____________________________________________________________  
  
Lam Captain Provider Signature:  ____________________________________________________________ Date:  ____________________________________________________________

## 2018-10-04 NOTE — ROUTINE PROCESS
TRANSFER - OUT REPORT: 
 
Verbal report given to Dearl Shanti RN (name) on Richard Sheets  being transferred to Behavioral (unit) for routine progression of care Report consisted of patients Situation, Background, Assessment and  
Recommendations(SBAR). Information from the following report(s) ED Summary and MAR was reviewed with the receiving nurse. Lines:    
 
Opportunity for questions and clarification was provided. Patient transported with: 
 [employer]

## 2018-10-04 NOTE — ED PROVIDER NOTES
EMERGENCY DEPARTMENT HISTORY AND PHYSICAL EXAM 
 
9:19 AM 
 
 
Date: 10/4/2018 Patient Name: Marci Garcia History of Presenting Illness Chief Complaint Patient presents with  Mental Health Problem History Provided By: Patient Chief Complaint: SI 
Duration:  Today Timing:  Acute Location: Psych Quality: Not applicable Severity: Moderate Modifying Factors: No modifying or aggravating factors were reported. Associated Symptoms: denies any other associated signs or symptoms Additional History (Context): Marci Garcia is a 50 y.o. male with PMHx of bipolar disorder, schizophrenia and suicidal behavior who presents to the ED with c/o acute SI today. Per [employer]PD, patient was at probation and parole hearing today when he mentioned suicidal ideations to police. Patient admits he has a plan to end his life, \"imelda for sure, I just know I was gonna do something. \" Officer reports patient history of paranoia, schizophrenia, \"says he didn't take his medications today, has been taking it from yesterday and beyond. \" Patient has been incarcerated for 3 months, \"yesterday he told his doctors his medications weren't working and the doctor wasn't gonna change them. \" Reports he has been compliant with all of his daily medications, \"I'm not on Haldol or Lithium anymore. \" Admits to ETOH use. Notes good PO intake. No modifying or aggravating factors were reported. No other symptoms or concerns were expressed. PCP: Alissa Heimlich, MD 
 
Current Facility-Administered Medications Medication Dose Route Frequency Provider Last Rate Last Dose  haloperidol (HALDOL) tablet 5 mg  5 mg Oral Q6H PRN Maddy Louise MD      
 haloperidol lactate (HALDOL) injection 5 mg  5 mg IntraMUSCular Q6H PRN Maddy Louise MD      
 LORazepam (ATIVAN) injection 2 mg  2 mg IntraMUSCular Q6H PRN Maddy Louise MD      
 LORazepam (ATIVAN) tablet 1 mg  1 mg Oral Q6H PRN Maddy Louise MD      
  ibuprofen (MOTRIN) tablet 400 mg  400 mg Oral Q6H PRN Ricky Pompa MD      
 
 
Past History Past Medical History: 
Past Medical History:  
Diagnosis Date  Bipolar disorder ([facility]  GERD (gastroesophageal reflux disease)  Schizophrenia ([facility]  Suicidal behavior Past Surgical History: No past surgical history on file. Family History: 
Family History Problem Relation Age of Onset  Diabetes Mother  Hypertension Father  Prostate Cancer Father  Heart Disease Father Social History: 
Social History Substance Use Topics  Smoking status: Current Every Day Smoker Packs/day: 1.00  Smokeless tobacco: Never Used  Alcohol use No  
 
 
Allergies: Allergies Allergen Reactions  Onion Rash Review of Systems Review of Systems Constitutional: Negative for activity change, fatigue and fever. HENT: Negative for congestion and rhinorrhea. Eyes: Negative for visual disturbance. Respiratory: Negative for shortness of breath. Cardiovascular: Negative for chest pain and palpitations. Gastrointestinal: Negative for abdominal pain, diarrhea, nausea and vomiting. Genitourinary: Negative for dysuria and hematuria. Musculoskeletal: Negative for back pain. Skin: Negative for rash. Neurological: Negative for dizziness, weakness and light-headedness. Psychiatric/Behavioral: Positive for suicidal ideas. All other systems reviewed and are negative. Physical Exam  
 
Visit Vitals  /78 (BP 1 Location: Right arm, BP Patient Position: Sitting)  Pulse 64  Temp 97.8 °F (36.6 °C)  Resp 16  SpO2 98% Physical Exam  
Constitutional: He is oriented to person, place, and time. He appears well-developed and well-nourished. No distress. HENT:  
Head: Normocephalic and atraumatic.   
Right Ear: External ear normal.  
Left Ear: External ear normal.  
Nose: Nose normal.  
 Mouth/Throat: Oropharynx is clear and moist.  
Eyes: Conjunctivae and EOM are normal. Pupils are equal, round, and reactive to light. No scleral icterus. Neck: Normal range of motion. Neck supple. No JVD present. No tracheal deviation present. No thyromegaly present. Cardiovascular: Normal rate, regular rhythm, normal heart sounds and intact distal pulses. Exam reveals no gallop and no friction rub. No murmur heard. Pulmonary/Chest: Effort normal and breath sounds normal. He exhibits no tenderness. Abdominal: Soft. Bowel sounds are normal. He exhibits no distension. There is no tenderness. There is no rebound and no guarding. Musculoskeletal: Normal range of motion. He exhibits no edema or tenderness. Lymphadenopathy:  
  He has no cervical adenopathy. Neurological: He is alert and oriented to person, place, and time. No cranial nerve deficit. Coordination normal.  
Skin: Skin is warm and dry. Psychiatric: He has a normal mood and affect. His behavior is normal. Judgment and thought content normal.  
Nursing note and vitals reviewed. Diagnostic Study Results Labs - Recent Results (from the past 12 hour(s)) EKG, 12 LEAD, INITIAL Collection Time: 10/04/18  9:24 AM  
Result Value Ref Range Ventricular Rate 66 BPM  
 Atrial Rate 66 BPM  
 P-R Interval 154 ms QRS Duration 80 ms  
 Q-T Interval 398 ms QTC Calculation (Bezet) 417 ms Calculated P Axis 31 degrees Calculated R Axis 20 degrees Calculated T Axis 15 degrees Diagnosis Normal sinus rhythm Normal ECG When compared with ECG of 13-OCT-2017 14:27, No significant change was found Confirmed by Pratik Medrano MD, Satish Moran ([id number]) on 10/4/2018 9:32:35 AM 
  
CBC WITH AUTOMATED DIFF Collection Time: 10/04/18  9:40 AM  
Result Value Ref Range WBC 7.6 4.6 - 13.2 K/uL  
 RBC 4.95 4.70 - 5.50 M/uL  
 HGB 14.4 13.0 - 16.0 g/dL HCT 40.0 36.0 - 48.0 %  MCV 80.8 74.0 - 97.0 FL  
 MCH 29.1 24.0 - 34.0 PG  
 MCHC 36.0 31.0 - 37.0 g/dL  
 RDW 15.0 (H) 11.6 - 14.5 % PLATELET 943 451 - 368 K/uL MPV 10.4 9.2 - 11.8 FL  
 NEUTROPHILS 62 40 - 73 % LYMPHOCYTES 25 21 - 52 % MONOCYTES 9 3 - 10 % EOSINOPHILS 4 0 - 5 % BASOPHILS 0 0 - 2 %  
 ABS. NEUTROPHILS 4.7 1.8 - 8.0 K/UL  
 ABS. LYMPHOCYTES 1.9 0.9 - 3.6 K/UL  
 ABS. MONOCYTES 0.7 0.05 - 1.2 K/UL  
 ABS. EOSINOPHILS 0.3 0.0 - 0.4 K/UL  
 ABS. BASOPHILS 0.0 0.0 - 0.1 K/UL  
 DF AUTOMATED METABOLIC PANEL, COMPREHENSIVE Collection Time: 10/04/18  9:40 AM  
Result Value Ref Range Sodium 144 136 - 145 mmol/L Potassium 3.9 3.5 - 5.5 mmol/L Chloride 108 100 - 108 mmol/L  
 CO2 27 21 - 32 mmol/L Anion gap 9 3.0 - 18 mmol/L Glucose 94 74 - 99 mg/dL BUN 16 7.0 - 18 MG/DL Creatinine 1.39 (H) 0.6 - 1.3 MG/DL  
 BUN/Creatinine ratio 12 12 - 20 GFR est AA >60 >60 ml/min/1.73m2 GFR est non-AA 55 (L) >60 ml/min/1.73m2 Calcium 9.3 8.5 - 10.1 MG/DL Bilirubin, total 0.8 0.2 - 1.0 MG/DL  
 ALT (SGPT) 25 16 - 61 U/L  
 AST (SGOT) 17 15 - 37 U/L Alk. phosphatase 112 45 - 117 U/L Protein, total 7.2 6.4 - 8.2 g/dL Albumin 3.6 3.4 - 5.0 g/dL Globulin 3.6 2.0 - 4.0 g/dL A-G Ratio 1.0 0.8 - 1.7 ACETAMINOPHEN Collection Time: 10/04/18  9:40 AM  
Result Value Ref Range Acetaminophen level <2 (L) 10.0 - 30.0 ug/mL URINALYSIS W/ RFLX MICROSCOPIC Collection Time: 10/04/18  9:40 AM  
Result Value Ref Range Color DISREGARD RESULTS, INVALID PATIENT ID Appearance DISREGARD RESULTS, INVALID PATIENT ID Specific gravity DISREGARD RESULTS, INVALID PATIENT ID 1.005 - 1.030 Specific gravity DISREGARD RESULTS, INVALID PATIENT ID 1.003 - 1.030    
 pH (UA) DISREGARD RESULTS, INVALID PATIENT ID 5.0 - 8.0 Protein DISREGARD RESULTS, INVALID PATIENT ID (A) NEG mg/dL Glucose DISREGARD RESULTS, INVALID PATIENT ID (A) NEG mg/dL Ketone DISREGARD RESULTS, INVALID PATIENT ID (A) NEG mg/dL Bilirubin DISREGARD RESULTS, INVALID PATIENT ID (A) NEG Blood DISREGARD RESULTS, INVALID PATIENT ID (A) NEG Urobilinogen DISREGARD RESULTS, INVALID PATIENT ID 0.2 - 1.0 EU/dL Nitrites DISREGARD RESULTS, INVALID PATIENT ID (A) NEG Leukocyte Esterase DISREGARD RESULTS, INVALID PATIENT ID (A) NEG    
DRUG SCREEN, URINE Collection Time: 10/04/18  9:40 AM  
Result Value Ref Range BENZODIAZEPINES NEGATIVE  NEG    
 BARBITURATES NEGATIVE  NEG    
 THC (TH-CANNABINOL) NEGATIVE  NEG    
 OPIATES NEGATIVE  NEG    
 PCP(PHENCYCLIDINE) NEGATIVE  NEG    
 COCAINE POSITIVE (A) NEG    
 AMPHETAMINES NEGATIVE  NEG METHADONE NEGATIVE  NEG HDSCOM (NOTE) VALPROIC ACID Collection Time: 10/04/18  9:40 AM  
Result Value Ref Range Valproic acid 34 (L) 50 - 100 ug/ml ETHYL ALCOHOL Collection Time: 10/04/18  9:40 AM  
Result Value Ref Range ALCOHOL(ETHYL),SERUM <3 0 - 3 MG/DL  
SALICYLATE Collection Time: 10/04/18  9:40 AM  
Result Value Ref Range Salicylate level <0.7 (L) 2.8 - 20.0 MG/DL URINALYSIS W/ RFLX MICROSCOPIC Collection Time: 10/04/18  9:48 AM  
Result Value Ref Range Color YELLOW Appearance CLEAR Specific gravity 1.013 1.005 - 1.030    
 pH (UA) 6.0 5.0 - 8.0 Protein NEGATIVE  NEG mg/dL Glucose NEGATIVE  NEG mg/dL Ketone NEGATIVE  NEG mg/dL Bilirubin NEGATIVE  NEG Blood NEGATIVE  NEG Urobilinogen 1.0 0.2 - 1.0 EU/dL Nitrites NEGATIVE  NEG Leukocyte Esterase NEGATIVE  NEG Medical Decision Making I am the first provider for this patient. I reviewed the vital signs, available nursing notes, past medical history, past surgical history, family history and social history. Vital Signs-Reviewed the patient's vital signs. Pulse Oximetry Analysis -  98% on room air, stable. Cardiac Monitor: 
Rate: 55 Rhythm:  Sinus Bradycardia EKG: Interpreted by the EP.  
 Time Interpreted: 9:24 AM 
 Rate: 66 
 Rhythm: Normal Sinus Rhythm Interpretation: No STEMI. Records Reviewed: Nursing Notes and Old Medical Records (Time of Review: 9:19 AM) ED Course: Progress Notes, Reevaluation, and Consults: 
Consult:  Discussed care with Crisis. Standard discussion; including history of patients chief complaint, available diagnostic results, and treatment course. Will evaluate patient. 10:14 AM, 10/4/2018 Consult:  Discussed care with meño Jensen. Standard discussion; including history of patients chief complaint, available diagnostic results, and treatment course. Looking for bed placement. 1:13 PM, 10/4/2018 Provider Notes (Medical Decision Making):  
Patient will be admitted to psychiatric bed here at [facility]. Stable at time of admission. Diagnosis Clinical Impression: 1. Suicidal ideation Disposition: Admit. Follow-up Information None Current Discharge Medication List  
  
CONTINUE these medications which have NOT CHANGED Details  
gabapentin (NEURONTIN) 600 mg tablet take 1 tablet by mouth twice a day Refills: 0  
  
hydrOXYzine HCl (ATARAX) 50 mg tablet Take 50 mg by mouth nightly. Refills: 0  
  
pantoprazole (PROTONIX) 40 mg tablet take 1 tablet by mouth at bedtime Refills: 0  
  
traZODone (DESYREL) 100 mg tablet Take 100 mg by mouth nightly. Refills: 0  
  
divalproex DR (DEPAKOTE) 250 mg tablet Take 250 mg by mouth nightly. Indications: 3 tabs  
  
meloxicam (MOBIC) 15 mg tablet Take 1 Tab by mouth daily as needed for Pain. Qty: 30 Tab, Refills: 1 Associated Diagnoses: Breast pain in male  
  
  
 
_______________________________ Attestations: 
Scribe Attestation Anjelica Jacobs acting as a scribe for and in the presence of Sammy Torres [facility], DO October 04, 2018 at 9:19 AM 
    
Provider Attestation:     
I personally performed the services described in the documentation, reviewed the documentation, as recorded by the scribe in my presence, and it accurately and completely records my words and actions. October 04, 2018 at 9:19 AM - Justyna Kuo DO   
_______________________________

## 2018-10-04 NOTE — ED TRIAGE NOTES
Patient brought in by [facility] for mental health evaluation. He attempted to hang himself several times. He is a voluntary admit.

## 2018-10-04 NOTE — BSMART NOTE
SW/CRISIS:  SW made contact with patient as requested by  [id number]. The request is for crisis evaluation is due to patients complaints/comments of mental health evaluation- active suicidal ideations with a plan:  Patient endorses depression and AH. Patient reports these feelings began due to traumatic experiences such as divorce and death. Patient reports he resides with his mother and is employed. Patient complains of having suicidal thoughts over the past several months and reports he has tried and was unsuccessful in the past.  Patient admits to substance abuse and reports his drug of choice if cocaine which he is positive. Patient denies inpatient treatment and reports he is a patient of Dr. Liudmila Lechuga with [facility]. The patient is oriented to person, place, time and situation. The patient is cooperative and while responding to questions asked. He is alert and oriented. The patient is a danger to self as he is having thoughts of self harm with depression. Patient was accepted for treatment by Dr. Janice Black treatment will be with Dr. Jae Olvera. Patient will be admitted to Adult Unit 126-1. Srikanth Stein, KANDY, LCSW-E

## 2018-10-04 NOTE — BH NOTES
Pt arrived onto unit in wheelchair with ED tech and security. Pt was cooperative with admission process. Pt was given dinner tray and oriented to unit. Pt agrees to come to staff if feelings of self harm or harm to others arise. Pt currently wearing non-skid footwear and is resting in room. Pt admitted d/t SI of hanging self for past several days. Outpatient psychiatric services provided by [address] [place] [address]. Pt has hx of substance abuse, tested positive for cocaine.

## 2018-10-04 NOTE — IP AVS SNAPSHOT
[address] [facility] Patient: Citlali Yates MRN: [id number] SUV:[id number] A check mariza indicates which time of day the medication should be taken. My Medications START taking these medications Instructions Each Dose to Equal  
 Morning Noon Evening Bedtime ARIPiprazole 10 mg tablet Commonly known as:  ABILIFY Start taking on:  10/10/2018 Your last dose was: Your next dose is: Take 1 Tab by mouth daily. Indications: Bipolar illness. 10 mg  
    
   
   
   
  
 divalproex  mg ER tablet Commonly known as:  DEPAKOTE ER  
Replaces:  DEPAKOTE 250 mg tablet Your last dose was: Your next dose is: Take 2 Tabs by mouth nightly. Indications: Bipolar disorder. 1000 mg  
    
   
   
   
  
 famotidine 40 mg tablet Commonly known as:  PEPCID Your last dose was: Your next dose is: Take 1 Tab by mouth two (2) times a day. Indications: HEARTBURN PREVENTION  
 40 mg CHANGE how you take these medications Instructions Each Dose to Equal  
 Morning Noon Evening Bedtime  
 traZODone 100 mg tablet Commonly known as:  Williams Comas What changed:  how much to take Your last dose was: Your next dose is: Take 2 Tabs by mouth nightly. Indications: insomnia. 200 mg  
    
   
   
   
  
  
STOP taking these medications DEPAKOTE 250 mg tablet Generic drug:  divalproex DR Replaced by:  divalproex  mg ER tablet  
   
  
 gabapentin 600 mg tablet Commonly known as:  NEURONTIN  
   
  
 hydrOXYzine HCl 50 mg tablet Commonly known as:  ATARAX  
   
  
 meloxicam 15 mg tablet Commonly known as:  MOBIC  
   
  
 pantoprazole 40 mg tablet Commonly known as:  PROTONIX Where to Get Your Medications These medications were sent to [address], [facility] [address] [facility] [address]  [facility] Phone:  [phone]  
  traZODone 100 mg tablet Information on where to get these meds will be given to you by the nurse or doctor. ! Ask your nurse or doctor about these medications ARIPiprazole 10 mg tablet  
 divalproex  mg ER tablet  
 famotidine 40 mg tablet

## 2018-10-05 PROBLEM — F14.14 COCAINE ABUSE WITH COCAINE-INDUCED MOOD DISORDER (HCC): Chronic | Status: ACTIVE | Noted: 2018-10-05

## 2018-10-05 PROCEDURE — 65220000005 HC RM SEMIPRIVATE PSYCH 3 OR 4 BED

## 2018-10-05 PROCEDURE — 74011250637 HC RX REV CODE- 250/637: Performed by: PSYCHIATRY & NEUROLOGY

## 2018-10-05 RX ORDER — HALOPERIDOL 5 MG/ML
5 INJECTION INTRAMUSCULAR
Status: DISCONTINUED | OUTPATIENT
Start: 2018-10-05 | End: 2018-10-09 | Stop reason: HOSPADM

## 2018-10-05 RX ORDER — ARIPIPRAZOLE 10 MG/1
10 TABLET ORAL DAILY
Status: DISCONTINUED | OUTPATIENT
Start: 2018-10-05 | End: 2018-10-09 | Stop reason: HOSPADM

## 2018-10-05 RX ADMIN — LORAZEPAM 1 MG: 1 TABLET ORAL at 08:34

## 2018-10-05 RX ADMIN — TRAZODONE HYDROCHLORIDE 150 MG: 100 TABLET ORAL at 20:35

## 2018-10-05 RX ADMIN — ARIPIPRAZOLE 10 MG: 10 TABLET ORAL at 16:14

## 2018-10-05 NOTE — BH NOTES
Pt given PRN lorazepam 1 mg PO for complaint anxiety, restless. Pt states he has not had medication in 2 days but cannot remember what he takes at home.

## 2018-10-05 NOTE — BH NOTES
Donnie Jones is not participating in Recreational Therapy. Group time: 1700 Personal goal for participation: fresh air break Goal orientation: social 
 
Group therapy participation: refuse Therapeutic interventions reviewed and discussed: Staff encouraged Pt. To participate in group Impression of participation: Pt refuse, chose to rest in bed despite staff encouragement

## 2018-10-05 NOTE — BH NOTES
GROUP THERAPY PROGRESS NOTE Daryl Caleb is participating in [place]. Group time: 20 minutes Personal goal for participation: none stated Goal orientation: community Group therapy participation: active Therapeutic interventions reviewed and discussed: unit rules and guidelines

## 2018-10-05 NOTE — BH NOTES
GROUP THERAPY PROGRESS NOTE Marci Garcia is participating in Positive thoughts. Group time: 1 hour Personal goal for participation: none Goal orientation: personal 
 
Group therapy participation: passive Therapeutic interventions reviewed and discussed: Discussed personal goals for self and education. Also discussed self love and the ways positive thoughts and positive self scripting can make achieving goals faster. Impression of participation: Pt listened to group but did not engage with peers or staff.
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
## 2018-10-05 NOTE — BH NOTES
MHT Note: Patient interacts with staff and peers minimally. Pt had no visitors today. Pt ate all meals on shift. Pt reports no new pain or symptoms and was asked to inform care team of any changes in condition. Pt watched groups but did not participate. Pt spent majority of shift in bed resting. Staff will continue to monitor pt for safety, behavior and location.

## 2018-10-05 NOTE — PROGRESS NOTES
Problem: Suicide/Homicide (Adult/Pediatric) Goal: *STG: Remains safe in hospital 
Pt will be free of harm each shift. Outcome: Progressing Towards Goal 
Patient remains safe. Goal: *STG: Seeks staff when feelings of self harm or harm towards others arise Pt will contract for safety each shift. Outcome: Not Progressing Towards Goal 
Patient is suicidal but contracts for safety. Goal: *STG: Attends activities and groups Pt will be present at 3 of 4 groups daily. Outcome: Progressing Towards Goal 
Attending groups. Problem: Psychosis Goal: *STG: Decreased hallucinations Patient will be assessed daily for auditory hallucinations. Outcome: Progressing Towards Goal 
Denies auditory hallucinations Comments: Patient  states he is hearing voices today, can't tell what they are saying , Suicidal thoughts but contracts for safety. Patient has been resting in bed throughout the shift.

## 2018-10-06 PROCEDURE — 74011250637 HC RX REV CODE- 250/637: Performed by: PSYCHIATRY & NEUROLOGY

## 2018-10-06 PROCEDURE — 65220000005 HC RM SEMIPRIVATE PSYCH 3 OR 4 BED

## 2018-10-06 RX ORDER — FAMOTIDINE 20 MG/1
40 TABLET, FILM COATED ORAL 2 TIMES DAILY
Status: DISCONTINUED | OUTPATIENT
Start: 2018-10-06 | End: 2018-10-09 | Stop reason: HOSPADM

## 2018-10-06 RX ORDER — DIVALPROEX SODIUM 500 MG/1
500 TABLET, EXTENDED RELEASE ORAL
Status: DISCONTINUED | OUTPATIENT
Start: 2018-10-06 | End: 2018-10-09

## 2018-10-06 RX ADMIN — ARIPIPRAZOLE 10 MG: 10 TABLET ORAL at 08:24

## 2018-10-06 RX ADMIN — LORAZEPAM 1 MG: 1 TABLET ORAL at 08:25

## 2018-10-06 RX ADMIN — DIVALPROEX SODIUM 500 MG: 500 TABLET, EXTENDED RELEASE ORAL at 20:54

## 2018-10-06 RX ADMIN — FAMOTIDINE 40 MG: 20 TABLET ORAL at 20:54

## 2018-10-06 RX ADMIN — HALOPERIDOL 5 MG: 5 TABLET ORAL at 08:28

## 2018-10-06 RX ADMIN — TRAZODONE HYDROCHLORIDE 150 MG: 100 TABLET ORAL at 20:54

## 2018-10-06 NOTE — BH NOTES
GROUP THERAPY PROGRESS NOTE    Richard Sheets was encouraged by staff but refused to participate in  Community.
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
## 2018-10-06 NOTE — PROGRESS NOTES
2315 Nick Gonzalez Physician Daily Progress Note Patient:  Adia Abad Age:  50 y.o. :  1970 SEX:  male MRN:  [id number] [facility]:  [id number] Admit Date:  10/4/2018 Attending:  Sheri Younger MD 
 
Subjective:  Eliot Yee a 50 Year old [address] [place] [address] h/o  of bipolar disorder in the context of worsening mood and suicidal ideations. He has been agitated and flipped the furniture earlier in the day. He is currently on parole. He reports out patient follow up with Dr. Patrica Izaguirre and shows a list of meds he has been on. Current Medications:   
Current Facility-Administered Medications Medication Dose Route Frequency Provider Last Rate Last Dose  haloperidol lactate (HALDOL) injection 5 mg  5 mg IntraMUSCular Q6H PRN Sheri Younger MD      
 ARIPiprazole (ABILIFY) tablet 10 mg  10 mg Oral DAILY Sheri Younger MD   10 mg at 10/06/18 6023  traZODone (DESYREL) tablet 150 mg  150 mg Oral QHS Sheri Younger MD   150 mg at 10/05/18 2035  
 haloperidol (HALDOL) tablet 5 mg  5 mg Oral Q6H PRN Sheri Younger MD   5 mg at 10/06/18 3017  LORazepam (ATIVAN) injection 2 mg  2 mg IntraMUSCular Q6H PRN Sheri Younger MD      
 LORazepam (ATIVAN) tablet 1 mg  1 mg Oral Q6H PRN Sheri Younger MD   1 mg at 10/06/18 8120  ibuprofen (MOTRIN) tablet 400 mg  400 mg Oral Q6H PRN Sheri Younger MD      
 
 
Compliant with medication:  Yes Side effects from medications:  No  
 
Mental Status Exam 
 
Appearance/Hygiene 50 y.o. BLACK OR  male Hygiene: wnl  
Orientation Cognition AO to self/place/date/reason for evaluation No gross impalement of concentration/memory Behavior/Social Relatedness cooperative Musculoskeletal Gait/Station: appropriate Tone (flaccid, cogwheeling, spastic): not assessed Psychomotor (hyperkinetic, hypokinetic): calm Involuntary movements (tics, dyskinesias, akathisa, stereotypies): none Speech                          Rate, rhythm, volume, fluency and articulation are appropriate Mood                          depressed Affect                                                   dysphoric Thought Process Organized and goal directed 
  
   
Thought Content and Perceptual Disturbances Denies delusions, ideas of reference, overvalued ideas, ruminations, obsession, compulsions, and phobias 
  
Denies self-injurious behavior/thoughts (SIB), aggressive behavior or homicidal ideation (HI) Reported suicidal ideation (SI) 
  Denies auditory and visual hallucinations  
     
Insight              marginal  
Judgment poor  
  
 
  
Diagnoses/Impressions:       
Psychiatric:  
 Principal Problem: 
  Bipolar 1 disorder ([facility] (12/26/2016) POA: Yes Overview: Dr. Sylvain Villarreal Active Problems: 
  Depression (10/4/2018) POA: Unknown Cocaine abuse with cocaine-induced mood disorder ([facility] (10/5/2018) POA: Unknown Medical:  
 
Visit Vitals  /89 (BP 1 Location: Right arm, BP Patient Position: Sitting)  Pulse 75  Temp 98.1 °F (36.7 °C)  Resp 18  SpO2 98% No lab exists for component: 24H Recommendations/Plan:   
 
[]  Dangerous and will not contract for safety in the community []  Response to medications is not adequate 
 
[]  Appropriate disposition not finalized 
 
[]  Collateral history needed to determine safety [x]  Continue current medications and follow MSE for sxs improvement 
 
[]  Medication changes as follows:  
 
[x]  Continue to build rapport [x]  Supportive psychotherapy [x]  Insight oriented therapy [x]  Group attendance/processing 
 
[x]  Relapse prevention 
 
 
[x]  Somatic Management [x]  Disposition planning               Arnold Gonzalez MD               10/6/2018          4:27 PM

## 2018-10-06 NOTE — PROGRESS NOTES
Problem: Suicide/Homicide (Adult/Pediatric) Goal: *STG: Remains safe in hospital 
Pt will be free of harm each shift. Outcome: Progressing Towards Goal 
Pt has not engaged in any self injurious behaviors Goal: *STG/LTG: Complies with medication therapy Pt will take all prescription medications as ordered. Outcome: Progressing Towards Goal 
Pt compliant with prescribed Comments: Pt started shift off in agitated and aggressive manner; pt threw breakfast tray across table and then proceed to flip table. Pt then walked to bedroom and slammed door, refusing to process with staff. When this writer approached pt in room pt screamed \"leave me the fuck alone. \" After this incident pt had one more incident of slamming bedroom door. When receiving morning medications pt stated he was upset because of peer and the amount of food he received on breakfast tray. Pt also voiced having not slept well last night and not being on current medications. Pt denies current SI and stated he is just tired and would like to sleep. Rounds maintained Q 15 mins Staff will continue to offer a safe and supportive environment

## 2018-10-07 PROCEDURE — 74011250637 HC RX REV CODE- 250/637: Performed by: PSYCHIATRY & NEUROLOGY

## 2018-10-07 PROCEDURE — 65220000005 HC RM SEMIPRIVATE PSYCH 3 OR 4 BED

## 2018-10-07 RX ORDER — TRAZODONE HYDROCHLORIDE 100 MG/1
200 TABLET ORAL
Status: DISCONTINUED | OUTPATIENT
Start: 2018-10-07 | End: 2018-10-09 | Stop reason: HOSPADM

## 2018-10-07 RX ADMIN — DIVALPROEX SODIUM 500 MG: 500 TABLET, EXTENDED RELEASE ORAL at 20:01

## 2018-10-07 RX ADMIN — ARIPIPRAZOLE 10 MG: 10 TABLET ORAL at 08:01

## 2018-10-07 RX ADMIN — FAMOTIDINE 40 MG: 20 TABLET ORAL at 20:01

## 2018-10-07 RX ADMIN — TRAZODONE HYDROCHLORIDE 200 MG: 100 TABLET ORAL at 20:01

## 2018-10-07 RX ADMIN — FAMOTIDINE 40 MG: 20 TABLET ORAL at 08:01

## 2018-10-07 NOTE — BH NOTES
GROUP THERAPY PROGRESS NOTE Ken Templeton is participating in medication administration. .  
 
Group time: 15 minutes Personal goal for participation: understanding medication being provided. Goal orientation: medication education Group therapy participation: active Therapeutic interventions reviewed and discussed: Always speak with the physicain if medication is needed or if the strength needs to be adjusted. Impression of participation: Patient was able to verbalize understanding of education provided.

## 2018-10-07 NOTE — BH NOTES
GROUP THERAPY PROGRESS NOTE Richard Joel is participating in Coping Skills Group and Community. Group time: 1 hour Goal orientation: personal 
 
Group therapy participation: active Therapeutic interventions reviewed and discussed: Unit guidelines and daily routine were reviewed. We also discussed worksheet entitled \"Twelve Ways to Stay Miserable\" which gave good tips for find happiness and peace and we discussed good coping skills for dealing with life situations which cause stress and anger. Impression of participation:   Kelly Mckeon was an active participant in the group. He gave good feedback to peers.

## 2018-10-07 NOTE — PROGRESS NOTES
2315 Nick Gonzalez Physician Daily Progress Note Patient:  Chasity Cardoso Age:  50 y.o. :  1970 SEX:  male MRN:  [id number] [facility]:  [id number] Admit Date:  10/4/2018 Attending:  Wendie Rubio MD 
 
Subjective:  Nabila Joseph a 50 Year old [address] [place] [address] h/o  of bipolar disorder in the context of worsening mood and suicidal ideations. He has been agitated and flipped the furniture yesterday. He is calmer today. He is currently on parole. He reports out patient follow up with Dr. Rochelle Dotson and shows a list of meds he has been on. He was started on Depakote. He  Continues to report poor sleep. Current Medications:   
Current Facility-Administered Medications Medication Dose Route Frequency Provider Last Rate Last Dose  divalproex ER (DEPAKOTE ER) 24 hour tablet 500 mg  500 mg Oral QHS Arnold Montalvo MD   500 mg at 10/06/18 205  famotidine (PEPCID) tablet 40 mg  40 mg Oral BID Arnold Montalvo MD   40 mg at 10/07/18 0801  
 haloperidol lactate (HALDOL) injection 5 mg  5 mg IntraMUSCular Q6H PRN Wendie Rubio MD      
 ARIPiprazole (ABILIFY) tablet 10 mg  10 mg Oral DAILY Wendie Rubio MD   10 mg at 10/07/18 0801  
 traZODone (DESYREL) tablet 150 mg  150 mg Oral QHS Wendie Rubio MD   150 mg at 10/06/18 2054  haloperidol (HALDOL) tablet 5 mg  5 mg Oral Q6H PRN Wendie Rubio MD   5 mg at 10/06/18 8580  LORazepam (ATIVAN) injection 2 mg  2 mg IntraMUSCular Q6H PRN Wendie Rubio MD      
 LORazepam (ATIVAN) tablet 1 mg  1 mg Oral Q6H PRN Wendie Rubio MD   1 mg at 10/06/18 0732  ibuprofen (MOTRIN) tablet 400 mg  400 mg Oral Q6H PRN Wendie Rubio MD      
 
 
Compliant with medication:  Yes Side effects from medications:  No  
 
Mental Status Exam 
 
Appearance/Hygiene 48 y.o. BLACK OR  male Hygiene: wnl  
Orientation Cognition AO to self/place/date/reason for evaluation No gross impalement of concentration/memory Behavior/Social Relatedness cooperative Musculoskeletal Gait/Station: appropriate Tone (flaccid, cogwheeling, spastic): not assessed Psychomotor (hyperkinetic, hypokinetic): calm Involuntary movements (tics, dyskinesias, akathisa, stereotypies): none Speech                          Rate, rhythm, volume, fluency and articulation are appropriate Mood                          depressed Affect                                                   dysphoric Thought Process Organized and goal directed    
    
Thought Content and Perceptual Disturbances Denies delusions, ideas of reference, overvalued ideas, ruminations, obsession, compulsions, and phobias 
   
Denies self-injurious behavior/thoughts (SIB), aggressive behavior or homicidal ideation (HI) Reported suicidal ideation (SI) 
   
Denies auditory and visual hallucinations        
Insight              marginal  
Judgment poor Diagnoses/Impressions:      
Psychiatric:  
 Principal Problem: 
  Bipolar 1 disorder ([facility] (12/26/2016) POA: Yes Overview: Dr. Pepper Closs Active Problems: 
  Depression (10/4/2018) POA: Unknown Cocaine abuse with cocaine-induced mood disorder ([facility] (10/5/2018) POA: Unknown Medical:  
 
Visit Vitals  /84 (BP 1 Location: Right arm, BP Patient Position: Sitting)  Pulse 66  Temp 98.1 °F (36.7 °C)  Resp 17  SpO2 98% No lab exists for component: 24H Recommendations/Plan:   
 
[]  Dangerous and will not contract for safety in the community []  Response to medications is not adequate 
 
[]  Appropriate disposition not finalized 
 
[]  Collateral history needed to determine safety [x]  Continue current medications and follow MSE for sxs improvement 
 
[]  Medication changes as follows:  
 
[x]  Continue to build rapport [x]  Supportive psychotherapy [x]  Insight oriented therapy [x]  Group attendance/processing 
 
[x]  Relapse prevention 
 
 
[x]  Somatic Management [x]  Disposition planning               Arnold Haines MD               10/7/2018          9:34 AM

## 2018-10-07 NOTE — BH NOTES
Patient was compliant with medication. Denied distress. Reported that he didn't get much sleep last night. Patient was informed of schedule medication and dosage. Patient with minimal interaction with staff and shook head \"yes\" or \"no\" when questions were asked. Will continue to monitor and provide support as needed.

## 2018-10-07 NOTE — PROGRESS NOTES
Problem: Suicide/Homicide (Adult/Pediatric) Goal: *STG: Seeks staff when feelings of self harm or harm towards others arise Pt will contract for safety each shift. Outcome: Progressing Towards Goal 
Patient denies thought of harm towards self or others. Problem: Psychosis Goal: *STG: Decreased hallucinations Patient will be assessed daily for auditory hallucinations. Outcome: Progressing Towards Goal 
Patient reports auditory hallucinations. Comments: Patient reports auditory hallucinations but denies they are \"bothering him. \" Patient reports he is no longer in a depressed mood so the hallucinations are no longer disruptive or command. Patient denies SI and speaks about future plans for the upcoming week. . Patient denies and feelings of aggression and reports his feeling from yesterday have completely resolved. Patient reports his only complaint is his inability to sleep. Patient observed laughing and smiling with peers on the unit. Patient meal and medication compliant. Rounds maintained q15 minutes. Staff will continue to provide a safe and supportive environment

## 2018-10-08 PROCEDURE — 74011250637 HC RX REV CODE- 250/637: Performed by: PSYCHIATRY & NEUROLOGY

## 2018-10-08 PROCEDURE — 65220000005 HC RM SEMIPRIVATE PSYCH 3 OR 4 BED

## 2018-10-08 RX ADMIN — IBUPROFEN 400 MG: 400 TABLET ORAL at 13:41

## 2018-10-08 RX ADMIN — FAMOTIDINE 40 MG: 20 TABLET ORAL at 08:29

## 2018-10-08 RX ADMIN — IBUPROFEN 400 MG: 400 TABLET ORAL at 20:08

## 2018-10-08 RX ADMIN — TRAZODONE HYDROCHLORIDE 200 MG: 100 TABLET ORAL at 20:07

## 2018-10-08 RX ADMIN — ARIPIPRAZOLE 10 MG: 10 TABLET ORAL at 08:30

## 2018-10-08 RX ADMIN — DIVALPROEX SODIUM 500 MG: 500 TABLET, EXTENDED RELEASE ORAL at 20:07

## 2018-10-08 RX ADMIN — FAMOTIDINE 40 MG: 20 TABLET ORAL at 20:07

## 2018-10-08 NOTE — BSMART NOTE
SW ENCOUNTER: The patient presented with a bright mood/affect; denied current depressive symptoms, SI/HI, intent, AVH and concerns regarding medications, health and safety. He expressed readiness for discharge. The SW addressed being consistent with self-care, medication regime and the utilization of healthy coping skills.

## 2018-10-08 NOTE — PROGRESS NOTES
Pt was seen, chart was reviewed and case was discussed with nursing staff A pt of Marline Castillo, [address] with [facility] he carries a historical dx of Bipolar Disorder. Since admission, he was started on a combination of medications that have included orders for aripiprazole and for divalproex. However the dose of divalproex is rather low at 500 mgs at bed time of the ER form. Current mental status shows evidence of improvement. There is some evidence of mood lability, however the patient denies any thourghts of self harm, or harm to others. There is no evidence of a psychotic process either. Tx Plan: 1) will increase dose of Depakote ER to 1000 mgs at bed time, with the possibility of drawing labs as an OP to follow. The patient indicated that he wishes to be discharged  tomorrow. This is possible if he continue to show evidence of improvement.

## 2018-10-08 NOTE — BH NOTES
gave this nurse verbal order to increase patient's Depakote ER to 1000mg QHS. Depakote was supposed to increase tonight. RN manager made aware earlier in shift and Crisis Supervisor informed by this nurse at this time.

## 2018-10-08 NOTE — PROGRESS NOTES
Problem: Falls - Risk of 
Goal: *Absence of Falls Pt will be free of falls each shift. Outcome: Progressing Towards Goal 
Fall Risk Interventions: 
  Patient will not experience any falls. Medication Interventions: Teach patient to arise slowly Problem: Suicide/Homicide (Adult/Pediatric) Goal: *STG: Attends activities and groups Pt will be present at 3 of 4 groups daily. Outcome: Progressing Towards Goal 
Patient will participate in at least 3-4 groups a day. Problem: Psychosis Goal: *STG: Decreased hallucinations Patient will be assessed daily for auditory hallucinations. Outcome: Progressing Towards Goal 
Patient will have decreased hallucinations. Comments: Patient is has been pleasant and cooperative. He has not voiced any thought to harm himself. Patient has been meal and medication compliant. He has attended the units scheduled groups  Patient has not required any PRN medications thus far. Patient has been interacting with peers and staff appropriately. Patient is monitored every 15 minutes for safety.

## 2018-10-08 NOTE — PROGRESS NOTES
Problem: Falls - Risk of 
Goal: *Absence of Falls Pt will be free of falls each shift. Fall Risk Interventions: Instruct patient to wear skid free footwear. Medication Interventions: Teach patient to arise slowly Problem: Suicide/Homicide (Adult/Pediatric) Goal: *STG: Remains safe in hospital 
Pt will be free of harm each shift. Outcome: Progressing Towards Goal 
Patient will be free from harm while on  Unit.

## 2018-10-08 NOTE — BH NOTES
GROUP THERAPY PROGRESS NOTE Richard Joel is participating in [place]. Group time: 30 minutes Personal goal for participation: rules/ regulations Goal orientation: community Group therapy participation: active Therapeutic interventions reviewed and discussed: He was encouraged to exercise and perform strategies to help him cope with his stressors. Impression of participation: He was alert and focused on him getting help with learning new coping skills during group.

## 2018-10-08 NOTE — BH NOTES
Jamie Brian is not participating in Recreational Therapy. Group time: 1700 Personal goal for participation: fresh air break Goal orientation: social 
 
Group therapy participation: refuse Therapeutic interventions reviewed and discussed: Staff encouraged Pt. To participate in group Impression of participation: Pt refuse, chose to rest in bed despite staff encouragement

## 2018-10-08 NOTE — BSMART NOTE
OCCUPATIONAL THERAPY PROGRESS NOTE Group Time:  1562 Attendance: The patient attended full group. Participation: The patient participated fully in the activity. Attention: The patient was able to focus on the activity. Interaction: The patient occasionally  interacts with others. Although the patient declined to sit at group table, he sat near and participated. Mood much brighter and patient more responsive on approach and more spontaneous in interaction than on 10/5. Able to ID (somwhat concretely) working on staying focused in the here and now to work on.

## 2018-10-09 VITALS
HEART RATE: 61 BPM | SYSTOLIC BLOOD PRESSURE: 121 MMHG | RESPIRATION RATE: 17 BRPM | OXYGEN SATURATION: 98 % | TEMPERATURE: 97.6 F | DIASTOLIC BLOOD PRESSURE: 80 MMHG

## 2018-10-09 PROCEDURE — 74011250637 HC RX REV CODE- 250/637: Performed by: PSYCHIATRY & NEUROLOGY

## 2018-10-09 RX ORDER — FAMOTIDINE 40 MG/1
40 TABLET, FILM COATED ORAL 2 TIMES DAILY
Qty: 30 TAB | Refills: 1 | Status: SHIPPED | OUTPATIENT
Start: 2018-10-09 | End: 2018-11-01 | Stop reason: ALTCHOICE

## 2018-10-09 RX ORDER — DIVALPROEX SODIUM 500 MG/1
1000 TABLET, EXTENDED RELEASE ORAL
Qty: 30 TAB | Refills: 1 | Status: SHIPPED | OUTPATIENT
Start: 2018-10-09 | End: 2020-11-09

## 2018-10-09 RX ORDER — DIVALPROEX SODIUM 500 MG/1
1000 TABLET, EXTENDED RELEASE ORAL
Status: DISCONTINUED | OUTPATIENT
Start: 2018-10-09 | End: 2018-10-09 | Stop reason: HOSPADM

## 2018-10-09 RX ORDER — ARIPIPRAZOLE 10 MG/1
10 TABLET ORAL DAILY
Qty: 15 TAB | Refills: 1 | Status: SHIPPED | OUTPATIENT
Start: 2018-10-10 | End: 2020-11-09

## 2018-10-09 RX ORDER — TRAZODONE HYDROCHLORIDE 100 MG/1
200 TABLET ORAL
Qty: 30 TAB | Refills: 1 | Status: SHIPPED | OUTPATIENT
Start: 2018-10-09 | End: 2020-11-09

## 2018-10-09 RX ADMIN — IBUPROFEN 400 MG: 400 TABLET ORAL at 08:29

## 2018-10-09 RX ADMIN — FAMOTIDINE 40 MG: 20 TABLET ORAL at 08:27

## 2018-10-09 RX ADMIN — ARIPIPRAZOLE 10 MG: 10 TABLET ORAL at 08:27

## 2018-10-09 NOTE — BH NOTES
GROUP THERAPY PROGRESS NOTE Emmett Diana  participated in Leisure-Creative Group. Group time: 30 minutes Personal goal for participation:  Discuss positive and also negative thoughts Goal orientation: personal 
 
Group therapy participation: active Therapeutic interventions reviewed and discussed: \" Life in my hands\". The group traced their hand on the back and front of a piece of paper. They put five positive characteristics ,and also symbols to describe themselves. The other hand was used to put five things that they could work on to improve their well being. They shared their \" Life in my hands\" with the staff ( me) , and also the rest of the group. Impression of participation:  The pt was active during group.

## 2018-10-09 NOTE — PROGRESS NOTES
Problem: Falls - Risk of 
Goal: *Absence of Falls Pt will be free of falls each shift. Fall Risk Interventions: 
  
 
 keep room clutter free Medication Interventions: Teach patient to arise slowly Problem: Suicide/Homicide (Adult/Pediatric) Goal: *STG: Remains safe in hospital 
Pt will be free of harm each shift. Outcome: Progressing Towards Goal 
Pt will remain safe in hospital daily during this admission. Goal: *STG: Seeks staff when feelings of self harm or harm towards others arise Pt will contract for safety each shift. Outcome: Progressing Towards Goal 
Pt will seek staff when feelings of self harm or harm towards others arise daily during this admission. Goal: *STG: Attends activities and groups Pt will be present at 3 of 4 groups daily. Outcome: Progressing Towards Goal 
Pt will attend 2-3 activities/groups daily during this admission. Goal: *STG/LTG: Complies with medication therapy Pt will take all prescription medications as ordered. Outcome: Progressing Towards Goal 
Pt will comply with medication therapy daily during this admission. Goal: *STG/LTG:  No longer expresses self destructive or suicidal/homicidal thoughts Pt will not report SI/HI each shift. Outcome: Progressing Towards Goal 
Pt will no longer express self destructive or suicidal/homicidal thoughts daily during this admission. Problem: Psychosis Goal: *STG: Decreased hallucinations Patient will be assessed daily for auditory hallucinations. Outcome: Progressing Towards Goal 
Pt will have decreased hallucinations daily during this admission. Goal: *STG/LTG: Complies with medication therapy Patient will take prescribe medications daily. Outcome: Progressing Towards Goal 
Pt will comply with medication therapy daily during this admission. Comments: Patient has been calm and cooperative. He denied suicidal/homicidal ideations and AV hallucinations.  He did not have visitors. He ate dinner, snack and medication compliant. Nursing will continue to provide a safe and supportive environment.

## 2018-10-09 NOTE — BH NOTES
Discharge instructions reviewed and given to pt along with RXs and all belongings. Follow up intact. Pt discharged to home as ordered.

## 2018-10-09 NOTE — DISCHARGE INSTRUCTIONS
BEHAVIORAL HEALTH NURSING DISCHARGE NOTE      The following personal items collected during your admission are returned to you:   Dental Appliance: Dental Appliances: None  Vision: Visual Aid: Glasses (did not bring to hospital)  Hearing Aid:    Jewelry: Jewelry: None  Clothing:    Other Valuables: Other Valuables:  (Glasses, wallet,Boots Hat.sweat-Pants)  Valuables sent to safe: Personal Items Sent to Safe:  (Birthcertitificate,social security,debit,2 healthcards ,id)      PATIENT INSTRUCTIONS:      Follow-up The patient has a appointment at [address] [place] [address] on 10/26/18 at 8:20 am with Dr. Stefania Richards. The address and contact number is [address] [place] [address] Street; Phone: [phone]. Numbers to remember [facility] Desk [facility] Emergency Services [phone] Suicide Prevention [phone](talk)    The discharge information has been reviewed with the patient. The patient verbalized understanding. [employer] Activation    Thank you for requesting access to [employer]. Please follow the instructions below to securely access and download your online medical record. [employer] allows you to send messages to your doctor, view your test results, renew your prescriptions, schedule appointments, and more. How Do I Sign Up? 1. In your internet browser, go to [email or web address]  2. Click on the First Time User? Click Here link in the Sign In box. You will be redirect to the New Member Sign Up page. 3. Enter your [employer] Access Code exactly as it appears below. You will not need to use this code after youve completed the sign-up process. If you do not sign up before the expiration date, you must request a new code. [employer]ip[employer] Access Code: [email or web address]  Expires: 2019  9:25 AM (This is the date your [employer] access code will )    4.  Enter the last four digits of your Social Security Number (xxxx) and Date of Birth (mm/dd/yyyy) as indicated and click Submit. You will be taken to the next sign-up page. 5. Create a [employer] ID. This will be your [employer] login ID and cannot be changed, so think of one that is secure and easy to remember. 6. Create a [employer] password. You can change your password at any time. 7. Enter your Password Reset Question and Answer. This can be used at a later time if you forget your password. 8. Enter your e-mail address. You will receive e-mail notification when new information is available in [address] 9. Click Sign Up. You can now view and download portions of your medical record. 10. Click the Download Summary menu link to download a portable copy of your medical information. Additional Information    If you have questions, please visit the Frequently Asked Questions section of the [employer] website at [email or web address] Remember, [employer] is NOT to be used for urgent needs. For medical emergencies, dial 911.       Patient armband removed and shredded

## 2018-10-09 NOTE — BSMART NOTE
COMMUNITY CONTACT: The patient has a appointment at [address] [place] [address] on 10/26/18 at 8:20 am with Dr. Rell Joshi. The address and contact number is [address] [place] [address]; Phone: [phone].

## 2018-10-10 NOTE — DISCHARGE SUMMARY
[facility] Alisson Headleyr  MR#: [id number]  : 1970  ACCOUNT #: [de-identified]   ADMIT DATE: 10/04/2018  DISCHARGE DATE: 10/09/2018    REASON FOR ADMISSION:  Patient was admitted to this facility under the care of Dr. Jean Reyes with a history of what appears to be a mood and a thought disorder (very possibly schizoaffective disorder, bipolar type), with a history of increasing suicidality, who presented to the emergency room with a complaint of acute suicidal ideations. Per police department, the patient was at probation and parole hearing on the day of admission when he mentioned the presence of suicidal ideations to a . He admitted then that he had a plan to end his life. The patient apparently had not been taking his medications appropriately, although that is still not clear. He did mention that he had taken from the day before and beyond, however, he did indicate that he was not on medications at the time of his court hearing. He has a history of being incarcerated for 3 months and had apparently seeing LARA Wells at [address] [place] [address] at which time he says he had mentioned to Mr. Maggie Desai the medications were not working very well for him. Obviously, the question was the patient being compliant or not. Regardless, the patient was then admitted to the facility and represcribed with the medications that he was being prescribed as an outpatient including aripiprazole and divalproex extended release. DISCHARGE DIAGNOSES:  AXIS I:  Schizoaffective disorder, bipolar type, cocaine use, mood disorder. AXIS II:  Deferred. AXIS III:  Reflux esophagitis by history. HOSPITAL COURSE:  Admitted to the adult program.  The patient was admitted voluntarily and remained voluntary patient throughout his hospital stay. Compliant with treatment.   He continues to take the prescriptions for aripiprazole 10 mg a day and Depakote extended release 500 mg at bedtime, which has been increased by the time of discharge to 500 mg of extended release tablets 2 every night. As an outpatient, he was being prescribed with Protonix; however, as an inpatient he was prescribed with Pepcid and apparently did require, per Dr. Armando Hahn notes, to be prescribed with trazodone up to 200 mg daily. The patient derived no side effects associated to medications as prescribed at the time of his inpatient admission. Seen today, the patient describes having the need to be discharged home since there is a letter that apparently he has to sign that is going to be sent back to his  and if he does not do this, he may have to be sent back to care home for probation violation. Regardless, the patient described himself as doing much better and so the reason for which we will proceed to discharge as indicated. CONDITION ON DISCHARGE:  Upon examination today, the patient is found to be not suicidal.  There is no evidence of dangerousness to all others. He is not psychotic. He is not organic and currently he is found to be psychiatrically competent and that he is able to know what he is doing. He knows the difference between right and wrong and knows what the consequences from his actions are. Prognosis will entirely depend on the patient's treatment compliance. DISCHARGE MEDICATIONS:  The patient is going to be prescribed with 2 weeks of prescriptions and 1 refill of the following medications including Abilify 10 mg every day, Depakote extended release 500 mg, #30, one refill to take 2 every night at bedtime, trazodone 100 mg tablets #30, one refill to take 2 every night at bedtime and Pepcid 20 mg tablets #30, one refill to take 1 twice daily. There is no evidence of medications with any side effects at the time of discharge.     It is to be mentioned that the patient has been advised about side effects and association to medications currently prescribed and those including but not limited to the presence of priapism in association to treatment with trazodone. DISPOSITION:  The patient is being discharged to outpatient care to be provided by LARA Gamino and the family physician of his choice. Appointment with Mr. Liudmila Lechuga 10/22 or 10/23. It is my understanding that the patient is also going to go back home to stay with family members. The patient was advised of the need to get blood levels since his Depakote levels done at time of admission were rather low. We did increase the patient's Depakote extended release dose to 500 mg at bedtime. Since he has been prescribed with aripiprazole, we will be still adjusting. Also, a repeat Tylenol and a hemoglobin A1c to be drawn as an outpatient.       Keira Choudhary  D: 10/09/2018 11:46     T: 10/10/2018 05:33  JOB #: [id number]

## 2018-11-01 ENCOUNTER — OFFICE VISIT (OUTPATIENT)
Dept: FAMILY MEDICINE CLINIC | Age: 48
End: 2018-11-01

## 2018-11-01 ENCOUNTER — HOSPITAL ENCOUNTER (OUTPATIENT)
Dept: LAB | Age: 48
Discharge: HOME | End: 2018-11-01

## 2018-11-01 VITALS
SYSTOLIC BLOOD PRESSURE: 150 MMHG | BODY MASS INDEX: 34.19 KG/M2 | DIASTOLIC BLOOD PRESSURE: 74 MMHG | HEART RATE: 78 BPM | RESPIRATION RATE: 16 BRPM | HEIGHT: 71 IN | TEMPERATURE: 98.4 F | WEIGHT: 244.2 LBS | OXYGEN SATURATION: 99 %

## 2018-11-01 DIAGNOSIS — E78.00 PURE HYPERCHOLESTEROLEMIA: ICD-10-CM

## 2018-11-01 DIAGNOSIS — R10.13 EPIGASTRIC PAIN: ICD-10-CM

## 2018-11-01 DIAGNOSIS — Z23 ENCOUNTER FOR IMMUNIZATION: Primary | ICD-10-CM

## 2018-11-01 PROCEDURE — 99001 SPECIMEN HANDLING PT-LAB: CPT | Performed by: NURSE PRACTITIONER

## 2018-11-01 RX ORDER — OMEPRAZOLE 40 MG/1
40 CAPSULE, DELAYED RELEASE ORAL
Qty: 30 CAP | Refills: 1 | Status: SHIPPED | OUTPATIENT
Start: 2018-11-01 | End: 2018-12-24 | Stop reason: SDUPTHER

## 2018-11-01 RX ORDER — FAMOTIDINE 40 MG/1
40 TABLET, FILM COATED ORAL 2 TIMES DAILY
Qty: 60 TAB | Refills: 2 | Status: CANCELLED | OUTPATIENT
Start: 2018-11-01

## 2018-11-01 NOTE — PROGRESS NOTES
Pt is here for follow up from lab taken several months ago. C/O abdominal pain over 1 year. 1. Have you been to the ER, urgent care clinic since your last visit? Hospitalized since your last visit? Yes [facility] MANUELA [facility] 10/4/18 Bipolar     2. Have you seen or consulted any other health care providers outside of the [address] [place] [address] since your last visit? Include any pap smears or colon screening. Yes Dr Tabor-psychiatrist.     Pt given Flu vaccine in L deltoid per verbral read back order Bhanu Wood, NP. Pt tolerated procedure w/o reaction after wait time.

## 2018-11-01 NOTE — PROGRESS NOTES
HISTORY OF PRESENT ILLNESS    Ye Natarajan is a 50y.o. year old male comes in today to be evaluated and treated for:  Lab results and abd pain    Patients symptoms have been present for 1 years. Pain level 10/10 mid abd,  It is described as nawing pain, describes as feeling like he just had surgery. States pain increases with eating, drinking, and laying down. Patient improved with sitting up. Patient states he was informed he had a small hernia. Reports he has been seen in the ED in the past for same and given liquid medication. Has not been seen by GI in the past.  Pain has not changed over the last year. Reviewed lab results and all questions answered. Allergies   Allergen Reactions    Onion Rash     Current Outpatient Medications   Medication Sig Dispense Refill    divalproex ER (DEPAKOTE ER) 500 mg ER tablet Take 2 Tabs by mouth nightly. Indications: Bipolar disorder. (Patient taking differently: Take 500 mg by mouth nightly.) 30 Tab 1    traZODone (DESYREL) 100 mg tablet Take 2 Tabs by mouth nightly. Indications: insomnia. 30 Tab 1    ARIPiprazole (ABILIFY) 10 mg tablet Take 1 Tab by mouth daily. Indications: Bipolar illness. 15 Tab 1    famotidine (PEPCID) 40 mg tablet Take 1 Tab by mouth two (2) times a day.  Indications: HEARTBURN PREVENTION 30 Tab 1     Past Medical History:   Diagnosis Date    Bipolar disorder ([facility] [address]     GERD (gastroesophageal reflux disease)     Schizophrenia ([facility])     Suicidal behavior      Component      Latest Ref Rng & Units 6/21/2018 6/21/2018 6/21/2018 6/21/2018           8:52 AM  8:52 AM  8:52 AM  8:52 AM   Glucose      65 - 99 mg/dL    85   BUN      6 - 24 mg/dL    13   Creatinine      0.76 - 1.27 mg/dL    1.55 (H)   GFR est non-AA      >59 mL/min/1.73    52 (L)   GFR est AA      >59 mL/min/1.73    60   BUN/Creatinine ratio      9 - 20    8 (L)   Sodium      134 - 144 mmol/L    139   Potassium      3.5 - 5.2 mmol/L    5.0   Chloride      96 - 106 mmol/L    102 CO2      20 - 29 mmol/L    23   Calcium      8.7 - 10.2 mg/dL    9.8   Protein, total      6.0 - 8.5 g/dL    7.2   Albumin      3.5 - 5.5 g/dL    4.5   GLOBULIN, TOTAL      1.5 - 4.5 g/dL    2.7   A-G Ratio      1.2 - 2.2    1.7   Bilirubin, total      0.0 - 1.2 mg/dL    0.6   Alk. phosphatase      39 - 117 IU/L    110   AST      0 - 40 IU/L    21   ALT (SGPT)      0 - 44 IU/L    18   Cholesterol, total      100 - 199 mg/dL       Triglyceride      0 - 149 mg/dL       HDL Cholesterol      >39 mg/dL       VLDL, calculated      5 - 40 mg/dL       LDL, calculated      0 - 99 mg/dL       Prostate Specific Ag      0.0 - 4.0 ng/mL   0.7    PSA, Free      N/A ng/mL   0.12    PSA, % Free      %   17.1    Hemoglobin A1c, (calculated)      4.8 - 5.6 %       Estimated average glucose      mg/dL       Magnesium      1.6 - 2.3 mg/dL  2.0     TSH      0.450 - 4.500 uIU/mL 1.040        Component      Latest Ref Rng & Units 6/21/2018 6/21/2018           8:52 AM  8:52 AM   Glucose      65 - 99 mg/dL     BUN      6 - 24 mg/dL     Creatinine      0.76 - 1.27 mg/dL     GFR est non-AA      >59 mL/min/1.73     GFR est AA      >59 mL/min/1.73     BUN/Creatinine ratio      9 - 20     Sodium      134 - 144 mmol/L     Potassium      3.5 - 5.2 mmol/L     Chloride      96 - 106 mmol/L     CO2      20 - 29 mmol/L     Calcium      8.7 - 10.2 mg/dL     Protein, total      6.0 - 8.5 g/dL     Albumin      3.5 - 5.5 g/dL     GLOBULIN, TOTAL      1.5 - 4.5 g/dL     A-G Ratio      1.2 - 2.2     Bilirubin, total      0.0 - 1.2 mg/dL     Alk.  phosphatase      39 - 117 IU/L     AST      0 - 40 IU/L     ALT (SGPT)      0 - 44 IU/L     Cholesterol, total      100 - 199 mg/dL  201 (H)   Triglyceride      0 - 149 mg/dL  91   HDL Cholesterol      >39 mg/dL  46   VLDL, calculated      5 - 40 mg/dL  18   LDL, calculated      0 - 99 mg/dL  137 (H)   Prostate Specific Ag      0.0 - 4.0 ng/mL     PSA, Free      N/A ng/mL     PSA, % Free      %     Hemoglobin A1c, (calculated)      4.8 - 5.6 % 5.3    Estimated average glucose      mg/dL 105    Magnesium      1.6 - 2.3 mg/dL     TSH      0.450 - 4.500 uIU/mL       ROS:  Review of Systems - General ROS: negative  Gastrointestinal ROS: positive for - abdominal pain      Objective:  Visit Vitals  /74 (BP 1 Location: Right arm, BP Patient Position: Sitting)   Pulse 78   Temp 98.4 °F (36.9 °C) (Oral)   Resp 16   Ht 5' 11\" (1.803 m)   Wt 244 lb 3.2 oz (110.8 kg)   SpO2 99%   BMI 34.06 kg/m²     General appearance - alert, well appearing, and in no distress  Neck - supple, no significant adenopathy  Chest - clear to auscultation, no wheezes, rales or rhonchi, symmetric air entry  Heart - normal rate, regular rhythm, normal S1, S2, no murmurs, rubs, clicks or gallops  Abdomen: soft. Bowel sounds normal. No masses,  no organomegaly, abnormal findings:  tenderness moderate in the epigastrium          Assessment/Plan:     ICD-10-CM ICD-9-CM    1. Encounter for immunization Z23 V03.89 INFLUENZA VIRUS VAC QUAD,SPLIT,PRESV FREE SYRINGE IM      NV IMMUNIZ ADMIN,1 SINGLE/COMB VAC/TOXOID   2. Epigastric pain R10.13 789.06 H. PYLORI ABS, IGG, IGA, IGM      omeprazole (PRILOSEC) 40 mg capsule      REFERRAL TO GASTROENTEROLOGY      H. PYLORI ABS, IGG, IGA, IGM   3. Pure hypercholesterolemia E78.00 272.0    discussed lifestyle changes  alarm signs when to seek emergent care provided and reviewed   I have discussed the diagnosis with the patient and the intended plan as seen in the above orders. The patient has received an after-visit summary and questions were answered concerning future plans. I have discussed medication side effects and warnings with the patient as well. Patient agreeable with above plan and verbalizes understanding. Follow-up Disposition:  Return in about 2 weeks (around 11/15/2018) for abd pain.

## 2018-11-01 NOTE — PATIENT INSTRUCTIONS
Vaccine Information Statement    Influenza (Flu) Vaccine (Inactivated or Recombinant): What you need to know    Many Vaccine Information Statements are available in Khmer and other languages. See www.immunize.org/vis  Hojas de Información Sobre Vacunas están disponibles en Español y en muchos otros idiomas. Visite www.immunize.org/vis    1. Why get vaccinated? Influenza (flu) is a contagious disease that spreads around the [place] every year, usually between October and May. Flu is caused by influenza viruses, and is spread mainly by coughing, sneezing, and close contact. Anyone can get flu. Flu strikes suddenly and can last several days. Symptoms vary by age, but can include:   fever/chills   sore throat   muscle aches   fatigue   cough   headache    runny or stuffy nose    Flu can also lead to pneumonia and blood infections, and cause diarrhea and seizures in children. If you have a medical condition, such as heart or lung disease, flu can make it worse. Flu is more dangerous for some people. Infants and young children, people 72years of age and older, pregnant women, and people with certain health conditions or a weakened immune system are at greatest risk. Each year thousands of people in the [facility] die from flu, and many more are hospitalized. Flu vaccine can:   keep you from getting flu,   make flu less severe if you do get it, and   keep you from spreading flu to your family and other people. 2. Inactivated and recombinant flu vaccines    A dose of flu vaccine is recommended every flu season. Children 6 months through 6years of age may need two doses during the same flu season. Everyone else needs only one dose each flu season.        Some inactivated flu vaccines contain a very small amount of a mercury-based preservative called thimerosal. Studies have not shown thimerosal in vaccines to be harmful, but flu vaccines that do not contain thimerosal are available. There is no live flu virus in flu shots. They cannot cause the flu. There are many flu viruses, and they are always changing. Each year a new flu vaccine is made to protect against three or four viruses that are likely to cause disease in the upcoming flu season. But even when the vaccine doesnt exactly match these viruses, it may still provide some protection    Flu vaccine cannot prevent:   flu that is caused by a virus not covered by the vaccine, or   illnesses that look like flu but are not. It takes about 2 weeks for protection to develop after vaccination, and protection lasts through the flu season. 3. Some people should not get this vaccine    Tell the person who is giving you the vaccine:     If you have any severe, life-threatening allergies. If you ever had a life-threatening allergic reaction after a dose of flu vaccine, or have a severe allergy to any part of this vaccine, you may be advised not to get vaccinated. Most, but not all, types of flu vaccine contain a small amount of egg protein.  If you ever had Guillain-Barré Syndrome (also called GBS). Some people with a history of GBS should not get this vaccine. This should be discussed with your doctor.  If you are not feeling well. It is usually okay to get flu vaccine when you have a mild illness, but you might be asked to come back when you feel better. 4. Risks of a vaccine reaction    With any medicine, including vaccines, there is a chance of reactions. These are usually mild and go away on their own, but serious reactions are also possible. Most people who get a flu shot do not have any problems with it.      Minor problems following a flu shot include:    soreness, redness, or swelling where the shot was given     hoarseness   sore, red or itchy eyes   cough   fever   aches   headache   itching   fatigue  If these problems occur, they usually begin soon after the shot and last 1 or 2 days. More serious problems following a flu shot can include the following:     There may be a small increased risk of Guillain-Barré Syndrome (GBS) after inactivated flu vaccine. This risk has been estimated at 1 or 2 additional cases per million people vaccinated. This is much lower than the risk of severe complications from flu, which can be prevented by flu vaccine.  Young children who get the flu shot along with pneumococcal vaccine (PCV13) and/or DTaP vaccine at the same time might be slightly more likely to have a seizure caused by fever. Ask your doctor for more information. Tell your doctor if a child who is getting flu vaccine has ever had a seizure. Problems that could happen after any injected vaccine:      People sometimes faint after a medical procedure, including vaccination. Sitting or lying down for about 15 minutes can help prevent fainting, and injuries caused by a fall. Tell your doctor if you feel dizzy, or have vision changes or ringing in the ears.  Some people get severe pain in the shoulder and have difficulty moving the arm where a shot was given. This happens very rarely.  Any medication can cause a severe allergic reaction. Such reactions from a vaccine are very rare, estimated at about 1 in a million doses, and would happen within a few minutes to a few hours after the vaccination. As with any medicine, there is a very remote chance of a vaccine causing a serious injury or death. The safety of vaccines is always being monitored. For more information, visit: www.cdc.gov/vaccinesafety/    5. What if there is a serious reaction? What should I look for?  Look for anything that concerns you, such as signs of a severe allergic reaction, very high fever, or unusual behavior.     Signs of a severe allergic reaction can include hives, swelling of the face and throat, difficulty breathing, a fast heartbeat, dizziness, and weakness - usually within a few minutes to a few hours after the vaccination. What should I do?  If you think it is a severe allergic reaction or other emergency that cant wait, call 9-1-1 and get the person to the nearest hospital. Otherwise, call your doctor.  Reactions should be reported to the Vaccine Adverse Event Reporting System (VAERS). Your doctor should file this report, or you can do it yourself through  the VAERS web site at www.vaers. [facility].gov, or by calling 1-248.801.8814. VAERS does not give medical advice. 6. The National Vaccine Injury Compensation Program    The [facility] Vaccine Injury Compensation Program (VICP) is a federal program that was created to compensate people who may have been injured by certain vaccines. Persons who believe they may have been injured by a vaccine can learn about the program and about filing a claim by calling 9-209.193.7663 or visiting the "[employer] website at www.[facility].gov/vaccinecompensation. There is a time limit to file a claim for compensation. 7. How can I learn more?  Ask your healthcare provider. He or she can give you the vaccine package insert or suggest other sources of information.  Call your local or state health department.  Contact the Centers for Disease Control and Prevention (CDC):  - Call 9-735.189.1332 (1-800-CDC-INFO) or  - Visit CDCs website at www.cdc.gov/flu    Vaccine Information Statement   Inactivated Influenza Vaccine   8/7/2015  42 KATHE Van [id number]-[id number]    Department of Health and Human Services  Centers for Disease Control and Prevention    Office Use Only       Indigestion (Dyspepsia or Heartburn): Care Instructions  Your Care Instructions  Sometimes it can be hard to pinpoint the cause of indigestion. (It is also called dyspepsia or heartburn.) Most cases of an upset stomach with bloating, burning, burping, and nausea are minor and go away within several hours. Home treatment and over-the-counter medicine often are able to control symptoms.  But if you take medicine to relieve your indigestion without making diet and lifestyle changes, your symptoms are likely to return again and again. If you get indigestion often, it may be a sign of a more serious medical problem. Be sure to follow up with your doctor, who may want to do tests to be sure of the cause of your indigestion. Follow-up care is a key part of your treatment and safety. Be sure to make and go to all appointments, and call your doctor if you are having problems. It's also a good idea to know your test results and keep a list of the medicines you take. How can you care for yourself at home? · Your doctor may recommend over-the-counter medicine. For mild or occasional indigestion, antacids such as Gaviscon, Mylanta, Maalox, or Tums, may help. Be safe with medicines. Be careful when you take over-the-counter antacid medicines. Many of these medicines have aspirin in them. Read the label to make sure that you are not taking more than the recommended dose. Too much aspirin can be harmful. · Your doctor also may recommend over-the-counter acid reducers, such as Pepcid AC, Tagamet HB, Zantac 75, or Prilosec. Read and follow all instructions on the label. If you use these medicines often, talk with your doctor. · Change your eating habits. ? It's best to eat several small meals instead of two or three large meals. ? After you eat, wait 2 to 3 hours before you lie down. ? Chocolate, mint, and alcohol can make GERD worse. ? Spicy foods, foods that have a lot of acid (like tomatoes and oranges), and coffee can make GERD symptoms worse in some people. If your symptoms are worse after you eat a certain food, you may want to stop eating that food to see if your symptoms get better. · Do not smoke or chew tobacco. Smoking can make GERD worse. If you need help quitting, talk to your doctor about stop-smoking programs and medicines. These can increase your chances of quitting for good.   · If you have GERD symptoms at night, raise the head of your bed 6 to 8 inches. You can do this by putting the frame on blocks or placing a foam wedge under the head of your mattress. (Adding extra pillows does not work.)  · Do not wear tight clothing around your middle. · Lose weight if you need to. Losing just 5 to 10 pounds can help. · Do not take anti-inflammatory medicines, such as aspirin, ibuprofen (Advil, Motrin), or naproxen (Aleve). These can irritate the stomach. If you need a pain medicine, try acetaminophen (Tylenol), which does not cause stomach upset. When should you call for help? Call your doctor now or seek immediate medical care if:    · You have new or worse belly pain.     · You are vomiting.    Watch closely for changes in your health, and be sure to contact your doctor if:    · You have new or worse symptoms of indigestion.     · You have trouble or pain swallowing.     · You are losing weight.     · You do not get better as expected. Where can you learn more? Go to http://martha-margaret.info/. Enter I335 in the search box to learn more about \"Indigestion (Dyspepsia or Heartburn): Care Instructions. \"  Current as of: March 28, 2018  Content Version: 11.8  © 1053-2592 [employer]. Care instructions adapted under license by [employer] (which disclaims liability or warranty for this information). If you have questions about a medical condition or this instruction, always ask your healthcare professional. [place] [address] any warranty or liability for your use of this information.

## 2018-11-03 LAB
H PYLORI IGA SER-ACNC: 12.1 UNITS (ref 0–8.9)
H PYLORI IGG SER IA-ACNC: <0.8 INDEX VALUE (ref 0–0.79)
H PYLORI IGM SER-ACNC: <9 UNITS (ref 0–8.9)

## 2018-11-15 ENCOUNTER — OFFICE VISIT (OUTPATIENT)
Dept: FAMILY MEDICINE CLINIC | Age: 48
End: 2018-11-15

## 2018-11-15 VITALS
OXYGEN SATURATION: 96 % | DIASTOLIC BLOOD PRESSURE: 73 MMHG | HEART RATE: 76 BPM | HEIGHT: 71 IN | WEIGHT: 240 LBS | BODY MASS INDEX: 33.6 KG/M2 | SYSTOLIC BLOOD PRESSURE: 113 MMHG | TEMPERATURE: 98.3 F | RESPIRATION RATE: 16 BRPM

## 2018-11-15 DIAGNOSIS — R10.13 EPIGASTRIC PAIN: Primary | ICD-10-CM

## 2018-11-15 NOTE — PATIENT INSTRUCTIONS
Indigestion (Dyspepsia or Heartburn): Care Instructions  Your Care Instructions  Sometimes it can be hard to pinpoint the cause of indigestion. (It is also called dyspepsia or heartburn.) Most cases of an upset stomach with bloating, burning, burping, and nausea are minor and go away within several hours. Home treatment and over-the-counter medicine often are able to control symptoms. But if you take medicine to relieve your indigestion without making diet and lifestyle changes, your symptoms are likely to return again and again. If you get indigestion often, it may be a sign of a more serious medical problem. Be sure to follow up with your doctor, who may want to do tests to be sure of the cause of your indigestion. Follow-up care is a key part of your treatment and safety. Be sure to make and go to all appointments, and call your doctor if you are having problems. It's also a good idea to know your test results and keep a list of the medicines you take. How can you care for yourself at home? · Your doctor may recommend over-the-counter medicine. For mild or occasional indigestion, antacids such as Gaviscon, Mylanta, Maalox, or Tums, may help. Be safe with medicines. Be careful when you take over-the-counter antacid medicines. Many of these medicines have aspirin in them. Read the label to make sure that you are not taking more than the recommended dose. Too much aspirin can be harmful. · Your doctor also may recommend over-the-counter acid reducers, such as Pepcid AC, Tagamet HB, Zantac 75, or Prilosec. Read and follow all instructions on the label. If you use these medicines often, talk with your doctor. · Change your eating habits. ? It's best to eat several small meals instead of two or three large meals. ? After you eat, wait 2 to 3 hours before you lie down. ? Chocolate, mint, and alcohol can make GERD worse. ?  Spicy foods, foods that have a lot of acid (like tomatoes and oranges), and coffee can make GERD symptoms worse in some people. If your symptoms are worse after you eat a certain food, you may want to stop eating that food to see if your symptoms get better. · Do not smoke or chew tobacco. Smoking can make GERD worse. If you need help quitting, talk to your doctor about stop-smoking programs and medicines. These can increase your chances of quitting for good. · If you have GERD symptoms at night, raise the head of your bed 6 to 8 inches. You can do this by putting the frame on blocks or placing a foam wedge under the head of your mattress. (Adding extra pillows does not work.)  · Do not wear tight clothing around your middle. · Lose weight if you need to. Losing just 5 to 10 pounds can help. · Do not take anti-inflammatory medicines, such as aspirin, ibuprofen (Advil, Motrin), or naproxen (Aleve). These can irritate the stomach. If you need a pain medicine, try acetaminophen (Tylenol), which does not cause stomach upset. When should you call for help? Call your doctor now or seek immediate medical care if:    · You have new or worse belly pain.     · You are vomiting.    Watch closely for changes in your health, and be sure to contact your doctor if:    · You have new or worse symptoms of indigestion.     · You have trouble or pain swallowing.     · You are losing weight.     · You do not get better as expected. Where can you learn more? Go to http://martha-margaret.info/. Enter A506 in the search box to learn more about \"Indigestion (Dyspepsia or Heartburn): Care Instructions. \"  Current as of: March 28, 2018  Content Version: 11.8  © 4993-8699 [employer]. Care instructions adapted under license by [employer] (which disclaims liability or warranty for this information).  If you have questions about a medical condition or this instruction, always ask your healthcare professional. [address] any warranty or liability for your use of this information.

## 2018-11-15 NOTE — PROGRESS NOTES
HISTORY OF PRESENT ILLNESS  Susanna Holland is a 50 y.o. male. Abdominal Pain   The history is provided by the patient. This is a recurrent problem. Episode onset: follow up on abd pain since beginning prilosec.  will call and schedule appt with GI today  The problem has been gradually improving. Associated symptoms include abdominal pain. Exacerbated by: eating feels like he needs to vomit. Relieved by: heartburn improved with omeprazole      Allergies   Allergen Reactions    Onion Rash     Current Outpatient Medications   Medication Sig Dispense Refill    omeprazole (PRILOSEC) 40 mg capsule Take 1 Cap by mouth Daily (before breakfast). 30 Cap 1    divalproex ER (DEPAKOTE ER) 500 mg ER tablet Take 2 Tabs by mouth nightly. Indications: Bipolar disorder. (Patient taking differently: Take 500 mg by mouth nightly.) 30 Tab 1    traZODone (DESYREL) 100 mg tablet Take 2 Tabs by mouth nightly. Indications: insomnia. 30 Tab 1    ARIPiprazole (ABILIFY) 10 mg tablet Take 1 Tab by mouth daily. Indications: Bipolar illness.  15 Tab 1     Past Medical History:   Diagnosis Date    [facility] ([facility] [address]     GERD (gastroesophageal reflux disease)     Schizophrenia ([facility]     Suicidal behavior      Social History     Socioeconomic History    Marital status: UNKNOWN     Spouse name: Not on file    Number of children: Not on file    Years of education: Not on file    Highest education level: Not on file   Social Needs    Financial resource strain: Not on file    Food insecurity - worry: Not on file    Food insecurity - inability: Not on file   [employer] needs - medical: Not on file   [employer] needs - non-medical: Not on file   Occupational History    Not on file   Tobacco Use    Smoking status: Current Every Day Smoker     Packs/day: 1.00    Smokeless tobacco: Never Used   Substance and Sexual Activity    Alcohol use: No    Drug use: No    Sexual activity: No   Other Topics Concern    Not on file Social History Narrative    Not on file     Review of Systems   Constitutional: Negative for chills and fever. Cardiovascular: Negative for PND. Gastrointestinal: Positive for abdominal pain and nausea (only with eating, slight improvement since beginning prilosec ). /73 (BP 1 Location: Left arm)   Pulse 76   Temp 98.3 °F (36.8 °C) (Oral)   Resp 16   Ht 5' 11\" (1.803 m)   Wt 240 lb (108.9 kg)   SpO2 96%   BMI 33.47 kg/m²     Physical Exam   Constitutional: He appears well-developed and well-nourished. Cardiovascular: Normal rate, regular rhythm and normal heart sounds. Exam reveals no gallop and no friction rub. No murmur heard. Pulmonary/Chest: Effort normal and breath sounds normal. He has no wheezes. He has no rhonchi. He has no rales. ASSESSMENT and PLAN    ICD-10-CM ICD-9-CM    1. Epigastric pain R10.13 789.06    I have discussed the diagnosis with the patient and the intended plan as seen in the above orders. The patient has received an after-visit summary and questions were answered concerning future plans. I have discussed medication side effects and warnings with the patient as well. Patient agreeable with above plan and verbalizes understanding.   Follow-up Disposition:  Return if symptoms worsen or fail to improve, for ensure to schedule appt with GI.

## 2018-12-24 DIAGNOSIS — R10.13 EPIGASTRIC PAIN: ICD-10-CM

## 2018-12-24 RX ORDER — OMEPRAZOLE 40 MG/1
40 CAPSULE, DELAYED RELEASE ORAL
Qty: 30 CAP | Refills: 1 | Status: SHIPPED | OUTPATIENT
Start: 2018-12-24 | End: 2019-03-01 | Stop reason: SDUPTHER

## 2018-12-24 NOTE — TELEPHONE ENCOUNTER
Requested Prescriptions     Pending Prescriptions Disp Refills    omeprazole (PRILOSEC) 40 mg capsule 30 Cap 1     Sig: Take 1 Cap by mouth Daily (before breakfast).

## 2019-01-02 ENCOUNTER — TELEPHONE (OUTPATIENT)
Dept: FAMILY MEDICINE CLINIC | Age: 49
End: 2019-01-02

## 2019-01-02 NOTE — TELEPHONE ENCOUNTER
PT is calling b/c he thinks he has the flu. He is experiencing congestion, runny nose, nausea  and at times he is cold at times he is hot, and also has a bad cough. PT did not want an appt, he just asked that the provider put in something to help him get better. Please Advise.     [phone]

## 2019-01-07 ENCOUNTER — OFFICE VISIT (OUTPATIENT)
Dept: FAMILY MEDICINE CLINIC | Age: 49
End: 2019-01-07

## 2019-01-07 VITALS
DIASTOLIC BLOOD PRESSURE: 84 MMHG | HEART RATE: 84 BPM | SYSTOLIC BLOOD PRESSURE: 118 MMHG | OXYGEN SATURATION: 99 % | RESPIRATION RATE: 18 BRPM | WEIGHT: 245.4 LBS | BODY MASS INDEX: 34.35 KG/M2 | TEMPERATURE: 98.2 F | HEIGHT: 71 IN

## 2019-01-07 DIAGNOSIS — J06.9 UPPER RESPIRATORY TRACT INFECTION, UNSPECIFIED TYPE: Primary | ICD-10-CM

## 2019-01-07 DIAGNOSIS — J30.9 ALLERGIC RHINITIS, UNSPECIFIED SEASONALITY, UNSPECIFIED TRIGGER: ICD-10-CM

## 2019-01-07 DIAGNOSIS — R05.9 COUGH: ICD-10-CM

## 2019-01-07 RX ORDER — BENZONATATE 200 MG/1
200 CAPSULE ORAL
Qty: 21 CAP | Refills: 0 | Status: SHIPPED | OUTPATIENT
Start: 2019-01-07 | End: 2019-01-21 | Stop reason: SDUPTHER

## 2019-01-07 RX ORDER — MONTELUKAST SODIUM 10 MG/1
10 TABLET ORAL
Qty: 30 TAB | Refills: 0 | Status: SHIPPED | OUTPATIENT
Start: 2019-01-07 | End: 2019-06-25

## 2019-01-07 NOTE — PROGRESS NOTES
HISTORY OF PRESENT ILLNESS  Noemy Alcaraz is a 50 y.o. male. Cold Symptoms   The history is provided by the patient. This is a new problem. Episode onset: 2 weeks ago  The problem has been gradually improving. The cough is productive of sputum (worse at night). There has been no fever. Associated symptoms include chills and sweats. Pertinent negatives include no chest pain, no ear congestion, no ear pain, no headaches, no myalgias and no wheezing. Associated symptoms comments: Nasal congestion   . Treatments tried: vinager and water  The treatment provided no relief. Allergies   Allergen Reactions    Onion Rash     Current Outpatient Medications   Medication Sig Dispense Refill    omeprazole (PRILOSEC) 40 mg capsule Take 1 Cap by mouth Daily (before breakfast). 30 Cap 1    divalproex ER (DEPAKOTE ER) 500 mg ER tablet Take 2 Tabs by mouth nightly. Indications: Bipolar disorder. (Patient taking differently: Take 500 mg by mouth nightly.) 30 Tab 1    traZODone (DESYREL) 100 mg tablet Take 2 Tabs by mouth nightly. Indications: insomnia. 30 Tab 1    ARIPiprazole (ABILIFY) 10 mg tablet Take 1 Tab by mouth daily. Indications: Bipolar illness.  15 Tab 1     Past Medical History:   Diagnosis Date    [facility] ([facility] [address] [facility]     GERD (gastroesophageal reflux disease)     Schizophrenia ([facility] [address] [facility]     Suicidal behavior      Social History     Socioeconomic History    Marital status: UNKNOWN     Spouse name: Not on file    Number of children: Not on file    Years of education: Not on file    Highest education level: Not on file   Social Needs    Financial resource strain: Not on file    Food insecurity - worry: Not on file    Food insecurity - inability: Not on file   [employer] needs - medical: Not on file   [employer] needs - non-medical: Not on file   Occupational History    Not on file   Tobacco Use    Smoking status: Current Every Day Smoker     Packs/day: 1.00    Smokeless tobacco: Never Used Substance and Sexual Activity    Alcohol use: No    Drug use: No    Sexual activity: No   Other Topics Concern    Not on file   Social History Narrative    Not on file     Review of Systems   Constitutional: Positive for chills. Negative for fever. HENT: Negative for ear pain. Respiratory: Positive for cough and sputum production (yellow ). Negative for wheezing. Cardiovascular: Negative for chest pain and palpitations. Musculoskeletal: Negative for myalgias. Neurological: Negative for dizziness and headaches. /84 (BP 1 Location: Left arm, BP Patient Position: Sitting)   Pulse 84   Temp 98.2 °F (36.8 °C) (Oral)   Resp 18   Ht 5' 11\" (1.803 m)   Wt 245 lb 6.4 oz (111.3 kg)   SpO2 99%   BMI 34.23 kg/m²   Physical Exam   Constitutional: He appears well-developed and well-nourished. HENT:   Head: Normocephalic and atraumatic. Right Ear: A middle ear effusion is present. Left Ear: A middle ear effusion is present. Nose: Mucosal edema (pale) present. Right sinus exhibits no maxillary sinus tenderness and no frontal sinus tenderness. Left sinus exhibits no maxillary sinus tenderness and no frontal sinus tenderness. Mouth/Throat: Uvula is midline, oropharynx is clear and moist and mucous membranes are normal.   Cobblestoning to post oropharynx   Neck: Normal range of motion. Neck supple. Cardiovascular: Normal rate and regular rhythm. Exam reveals no gallop and no friction rub. No murmur heard. Pulmonary/Chest: Effort normal and breath sounds normal. He has no wheezes. He has no rhonchi. He has no rales. Lymphadenopathy:     He has no cervical adenopathy. Skin: Skin is warm and dry. ASSESSMENT and PLAN    ICD-10-CM ICD-9-CM    1. Upper respiratory tract infection, unspecified type J06.9 465.9    2. Cough R05 786.2    3.  Allergic rhinitis, unspecified seasonality, unspecified trigger J30.9 477.9      Orders Placed This Encounter    montelukast (SINGULAIR) 10 mg tablet    benzonatate (TESSALON) 200 mg capsule     I have discussed the diagnosis with the patient and the intended plan as seen in the above orders. The patient has received an after-visit summary and questions were answered concerning future plans. I have discussed medication side effects and warnings with the patient as well. Patient agreeable with above plan and verbalizes understanding. Follow-up Disposition:  Return in about 2 weeks (around 1/21/2019) for cough if no improvement.

## 2019-01-07 NOTE — PROGRESS NOTES
Chief Complaint   Patient presents with    Cough      x 2 weeks    Chills     x 2 weeks    Cold Symptoms     x 2 weeks     1. Have you been to the ER, urgent care clinic since your last visit? Hospitalized since your last visit? No    2. Have you seen or consulted any other health care providers outside of the [address] [place] [address] since your last visit? Include any pap smears or colon screening.  No

## 2019-01-14 RX ORDER — BENZONATATE 200 MG/1
200 CAPSULE ORAL
Qty: 21 CAP | Refills: 0 | OUTPATIENT
Start: 2019-01-14 | End: 2019-01-21

## 2019-01-14 RX ORDER — BENZONATATE 200 MG/1
CAPSULE ORAL
Qty: 21 CAP | Refills: 0 | OUTPATIENT
Start: 2019-01-14

## 2019-01-14 NOTE — TELEPHONE ENCOUNTER
Requested Prescriptions     Pending Prescriptions Disp Refills    benzonatate (TESSALON) 200 mg capsule 21 Cap 0     Sig: Take 1 Cap by mouth three (3) times daily as needed for Cough for up to 7 days.

## 2019-01-21 ENCOUNTER — OFFICE VISIT (OUTPATIENT)
Dept: FAMILY MEDICINE CLINIC | Age: 49
End: 2019-01-21

## 2019-01-21 VITALS
DIASTOLIC BLOOD PRESSURE: 80 MMHG | TEMPERATURE: 98.2 F | BODY MASS INDEX: 34.86 KG/M2 | OXYGEN SATURATION: 98 % | WEIGHT: 249 LBS | HEIGHT: 71 IN | SYSTOLIC BLOOD PRESSURE: 139 MMHG | HEART RATE: 57 BPM | RESPIRATION RATE: 16 BRPM

## 2019-01-21 DIAGNOSIS — J30.9 ALLERGIC RHINITIS, UNSPECIFIED SEASONALITY, UNSPECIFIED TRIGGER: ICD-10-CM

## 2019-01-21 DIAGNOSIS — R05.9 COUGH: ICD-10-CM

## 2019-01-21 DIAGNOSIS — E66.9 OBESITY, CLASS I, BMI 30-34.9: ICD-10-CM

## 2019-01-21 DIAGNOSIS — H66.92 LEFT OTITIS MEDIA, UNSPECIFIED OTITIS MEDIA TYPE: Primary | ICD-10-CM

## 2019-01-21 RX ORDER — BENZONATATE 200 MG/1
200 CAPSULE ORAL
Qty: 21 CAP | Refills: 0 | Status: SHIPPED | OUTPATIENT
Start: 2019-01-21 | End: 2019-01-28

## 2019-01-21 RX ORDER — AMOXICILLIN AND CLAVULANATE POTASSIUM 875; 125 MG/1; MG/1
1 TABLET, FILM COATED ORAL EVERY 12 HOURS
Qty: 20 TAB | Refills: 0 | Status: SHIPPED | OUTPATIENT
Start: 2019-01-21 | End: 2019-01-31

## 2019-01-21 RX ORDER — LORATADINE 10 MG/1
10 TABLET ORAL DAILY
Qty: 30 TAB | Refills: 3
Start: 2019-01-21 | End: 2019-06-25

## 2019-01-21 NOTE — PROGRESS NOTES
HISTORY OF PRESENT ILLNESS    July Gillis is a 50y.o. year old male comes in today for:  Cough    Patient was treated for an upper respiratory infection and allergic rhinitis on 1/7/19. He was prescribed montelukast and benzonatate capsules. States montelukast has not helped with symptoms. Patients states he is coughing up white sputum. Denies cp, sob. He does continue to have post nasal drip(feels like a drain). Allergies   Allergen Reactions    Onion Rash     Current Outpatient Medications   Medication Sig Dispense Refill    montelukast (SINGULAIR) 10 mg tablet Take 1 Tab by mouth nightly. 30 Tab 0    omeprazole (PRILOSEC) 40 mg capsule Take 1 Cap by mouth Daily (before breakfast). 30 Cap 1    divalproex ER (DEPAKOTE ER) 500 mg ER tablet Take 2 Tabs by mouth nightly. Indications: Bipolar disorder. (Patient taking differently: Take 500 mg by mouth nightly.) 30 Tab 1    traZODone (DESYREL) 100 mg tablet Take 2 Tabs by mouth nightly. Indications: insomnia. 30 Tab 1    ARIPiprazole (ABILIFY) 10 mg tablet Take 1 Tab by mouth daily. Indications: Bipolar illness. 15 Tab 1     Past Medical History:   Diagnosis Date    [facility] [address]     GERD (gastroesophageal reflux disease)     Schizophrenia ([facility] [address]     Suicidal behavior        ROS:  Review of Systems   Constitutional: Negative for chills and fever. HENT: Positive for ear pain (left x1 week). Negative for congestion, sinus pain and sore throat. Respiratory: Positive for cough and sputum production (white). Negative for shortness of breath. Cardiovascular: Negative for chest pain and palpitations. Neurological: Negative for dizziness and headaches. Objective:  Visit Vitals  /80 (BP 1 Location: Left arm)   Pulse (!) 57   Temp 98.2 °F (36.8 °C) (Oral)   Resp 16   Ht 5' 11\" (1.803 m)   Wt 249 lb (112.9 kg)   SpO2 98%   BMI 34.73 kg/m²     Physical Exam   Constitutional: He is well-developed, well-nourished, and in no distress.  No distress. HENT:   Head: Normocephalic and atraumatic. Right Ear: Tympanic membrane is not erythematous. A middle ear effusion is present. Left Ear: Tympanic membrane is erythematous. A middle ear effusion is present. Nose: Mucosal edema and rhinorrhea (clear) present. Right sinus exhibits no frontal sinus tenderness. Left sinus exhibits no frontal sinus tenderness. Mouth/Throat: Uvula is midline, oropharynx is clear and moist and mucous membranes are normal.   Neck: Normal range of motion. Neck supple. Cardiovascular: Normal rate, regular rhythm and normal heart sounds. Exam reveals no gallop and no friction rub. No murmur heard. Pulmonary/Chest: Effort normal and breath sounds normal. He has no wheezes. He has no rhonchi. He has no rales. Lymphadenopathy:     He has no cervical adenopathy. Assessment/Plan:     ICD-10-CM ICD-9-CM    1. Left otitis media, unspecified otitis media type H66.92 382.9 amoxicillin-clavulanate (AUGMENTIN) 875-125 mg per tablet   2. Cough R05 786.2    3. Allergic rhinitis, unspecified seasonality, unspecified trigger J30.9 477.9    4. Obesity, Class I, BMI 30-34.9 E66.9 278.00      Orders Placed This Encounter    amoxicillin-clavulanate (AUGMENTIN) 875-125 mg per tablet    benzonatate (TESSALON) 200 mg capsule    loratadine (CLARITIN) 10 mg tablet     Instructed to begin taking otc loratadine every morning. I have reviewed/discussed the above normal BMI with the patient. Discussed the importance of weight loss. I have recommended the following interventions: dietary management education, guidance, and counseling, encourage exercise, monitor weight and prescribed dietary intake. I have discussed the diagnosis with the patient and the intended plan as seen in the above orders. The patient has received an after-visit summary and questions were answered concerning future plans.   I have discussed medication side effects and warnings with the patient as well.  Patient agreeable with above plan and verbalizes understanding. Follow-up Disposition:  Return if symptoms worsen or fail to improve.

## 2019-01-21 NOTE — PATIENT INSTRUCTIONS

## 2019-01-21 NOTE — PROGRESS NOTES
Pt is here for 2 week on cough    1. Have you been to the ER, urgent care clinic since your last visit? Hospitalized since your last visit? No    2. Have you seen or consulted any other health care providers outside of the [address] [place] [address] since your last visit? Include any pap smears or colon screening.  No

## 2019-02-18 ENCOUNTER — HOSPITAL ENCOUNTER (EMERGENCY)
Age: 49
Discharge: HOME | End: 2019-02-18
Attending: EMERGENCY MEDICINE
Payer: MEDICAID

## 2019-02-18 VITALS
TEMPERATURE: 97.8 F | SYSTOLIC BLOOD PRESSURE: 124 MMHG | HEART RATE: 62 BPM | RESPIRATION RATE: 16 BRPM | OXYGEN SATURATION: 100 % | WEIGHT: 250 LBS | DIASTOLIC BLOOD PRESSURE: 79 MMHG | BODY MASS INDEX: 34.87 KG/M2

## 2019-02-18 DIAGNOSIS — R10.13 ABDOMINAL PAIN, EPIGASTRIC: Primary | ICD-10-CM

## 2019-02-18 LAB
ALBUMIN SERPL-MCNC: 3.3 G/DL (ref 3.4–5)
ALBUMIN/GLOB SERPL: 1 {RATIO} (ref 0.8–1.7)
ALP SERPL-CCNC: 135 U/L (ref 45–117)
ALT SERPL-CCNC: 25 U/L (ref 16–61)
ANION GAP SERPL CALC-SCNC: 6 MMOL/L (ref 3–18)
APPEARANCE UR: CLEAR
AST SERPL-CCNC: 17 U/L (ref 15–37)
BASOPHILS # BLD: 0 K/UL (ref 0–0.1)
BASOPHILS NFR BLD: 0 % (ref 0–2)
BILIRUB SERPL-MCNC: 0.4 MG/DL (ref 0.2–1)
BILIRUB UR QL: NEGATIVE
BUN SERPL-MCNC: 11 MG/DL (ref 7–18)
BUN/CREAT SERPL: 9 (ref 12–20)
CALCIUM SERPL-MCNC: 7.9 MG/DL (ref 8.5–10.1)
CHLORIDE SERPL-SCNC: 109 MMOL/L (ref 100–108)
CO2 SERPL-SCNC: 27 MMOL/L (ref 21–32)
COLOR UR: YELLOW
CREAT SERPL-MCNC: 1.18 MG/DL (ref 0.6–1.3)
DIFFERENTIAL METHOD BLD: ABNORMAL
EOSINOPHIL # BLD: 0.2 K/UL (ref 0–0.4)
EOSINOPHIL NFR BLD: 4 % (ref 0–5)
ERYTHROCYTE [DISTWIDTH] IN BLOOD BY AUTOMATED COUNT: 14.5 % (ref 11.6–14.5)
GLOBULIN SER CALC-MCNC: 3.4 G/DL (ref 2–4)
GLUCOSE SERPL-MCNC: 96 MG/DL (ref 74–99)
GLUCOSE UR STRIP.AUTO-MCNC: NEGATIVE MG/DL
HCT VFR BLD AUTO: 37.3 % (ref 36–48)
HGB BLD-MCNC: 12.9 G/DL (ref 13–16)
HGB UR QL STRIP: NEGATIVE
KETONES UR QL STRIP.AUTO: NEGATIVE MG/DL
LEUKOCYTE ESTERASE UR QL STRIP.AUTO: NEGATIVE
LIPASE SERPL-CCNC: 78 U/L (ref 73–393)
LYMPHOCYTES # BLD: 1.3 K/UL (ref 0.9–3.6)
LYMPHOCYTES NFR BLD: 27 % (ref 21–52)
MCH RBC QN AUTO: 28.6 PG (ref 24–34)
MCHC RBC AUTO-ENTMCNC: 34.6 G/DL (ref 31–37)
MCV RBC AUTO: 82.7 FL (ref 74–97)
MONOCYTES # BLD: 0.7 K/UL (ref 0.05–1.2)
MONOCYTES NFR BLD: 13 % (ref 3–10)
NEUTS SEG # BLD: 2.7 K/UL (ref 1.8–8)
NEUTS SEG NFR BLD: 56 % (ref 40–73)
NITRITE UR QL STRIP.AUTO: NEGATIVE
PH UR STRIP: 7.5 [PH] (ref 5–8)
PLATELET # BLD AUTO: 138 K/UL (ref 135–420)
PMV BLD AUTO: 10.3 FL (ref 9.2–11.8)
POTASSIUM SERPL-SCNC: 4.1 MMOL/L (ref 3.5–5.5)
PROT SERPL-MCNC: 6.7 G/DL (ref 6.4–8.2)
PROT UR STRIP-MCNC: NEGATIVE MG/DL
RBC # BLD AUTO: 4.51 M/UL (ref 4.7–5.5)
SODIUM SERPL-SCNC: 142 MMOL/L (ref 136–145)
SP GR UR REFRACTOMETRY: 1.01 (ref 1–1.03)
UROBILINOGEN UR QL STRIP.AUTO: 0.2 EU/DL (ref 0.2–1)
WBC # BLD AUTO: 5 K/UL (ref 4.6–13.2)

## 2019-02-18 PROCEDURE — 96375 TX/PRO/DX INJ NEW DRUG ADDON: CPT

## 2019-02-18 PROCEDURE — 74011000250 HC RX REV CODE- 250: Performed by: EMERGENCY MEDICINE

## 2019-02-18 PROCEDURE — 74011250637 HC RX REV CODE- 250/637: Performed by: EMERGENCY MEDICINE

## 2019-02-18 PROCEDURE — 74011250636 HC RX REV CODE- 250/636: Performed by: EMERGENCY MEDICINE

## 2019-02-18 PROCEDURE — 85025 COMPLETE CBC W/AUTO DIFF WBC: CPT

## 2019-02-18 PROCEDURE — 99283 EMERGENCY DEPT VISIT LOW MDM: CPT

## 2019-02-18 PROCEDURE — 80053 COMPREHEN METABOLIC PANEL: CPT

## 2019-02-18 PROCEDURE — 96361 HYDRATE IV INFUSION ADD-ON: CPT

## 2019-02-18 PROCEDURE — 83690 ASSAY OF LIPASE: CPT

## 2019-02-18 PROCEDURE — 96374 THER/PROPH/DIAG INJ IV PUSH: CPT

## 2019-02-18 PROCEDURE — 81003 URINALYSIS AUTO W/O SCOPE: CPT

## 2019-02-18 RX ORDER — FAMOTIDINE 20 MG/1
20 TABLET, FILM COATED ORAL 2 TIMES DAILY
Qty: 30 TAB | Refills: 0 | Status: SHIPPED | OUTPATIENT
Start: 2019-02-18 | End: 2019-06-25

## 2019-02-18 RX ORDER — LIDOCAINE HYDROCHLORIDE 20 MG/ML
15 SOLUTION OROPHARYNGEAL
Status: COMPLETED | OUTPATIENT
Start: 2019-02-18 | End: 2019-02-18

## 2019-02-18 RX ORDER — ONDANSETRON 4 MG/1
4 TABLET, ORALLY DISINTEGRATING ORAL
Qty: 12 TAB | Refills: 0 | Status: SHIPPED | OUTPATIENT
Start: 2019-02-18 | End: 2019-02-22

## 2019-02-18 RX ORDER — ONDANSETRON 2 MG/ML
4 INJECTION INTRAMUSCULAR; INTRAVENOUS
Status: COMPLETED | OUTPATIENT
Start: 2019-02-18 | End: 2019-02-18

## 2019-02-18 RX ORDER — FAMOTIDINE 20 MG/1
20 TABLET, FILM COATED ORAL
Status: DISCONTINUED | OUTPATIENT
Start: 2019-02-18 | End: 2019-02-18

## 2019-02-18 RX ORDER — FAMOTIDINE 10 MG/ML
20 INJECTION INTRAVENOUS
Status: COMPLETED | OUTPATIENT
Start: 2019-02-18 | End: 2019-02-18

## 2019-02-18 RX ADMIN — ALUMINUM HYDROXIDE AND MAGNESIUM HYDROXIDE 15 ML: 200; 200 SUSPENSION ORAL at 11:08

## 2019-02-18 RX ADMIN — LIDOCAINE HYDROCHLORIDE 15 ML: 20 SOLUTION ORAL; TOPICAL at 11:08

## 2019-02-18 RX ADMIN — SODIUM CHLORIDE 1000 ML: 900 INJECTION, SOLUTION INTRAVENOUS at 11:12

## 2019-02-18 RX ADMIN — ONDANSETRON 4 MG: 2 INJECTION INTRAMUSCULAR; INTRAVENOUS at 11:11

## 2019-02-18 RX ADMIN — FAMOTIDINE 20 MG: 10 INJECTION, SOLUTION INTRAVENOUS at 11:11

## 2019-02-18 NOTE — ED PROVIDER NOTES
EMERGENCY DEPARTMENT HISTORY AND PHYSICAL EXAM 
 
11:13 AM 
Date: 2/18/2019 Patient Name: Zaira Youssef History of Presenting Illness Chief Complaint:  
Chief Complaint Patient presents with  Abdominal Pain History Provided By: Patient Additional History (Context): Zaira Youssef is a 50 y.o. male with GERD who presents with abdominal pain. He states that this pain started on Thursday along with N/V/D and abd pain when drinking. He says that he took a liquid form of medication that helped his BM, but denies relief of abd pain. He also admits having dysuria, frequency. Per pt, he was hospitalized in 2017 for the same sx and was told that it was due to the medications he was taking at the time. No other concerns or symptoms at this time. PCP: Katrin Duran NP Duration:  3 days Timing:  Worsening Location: GI 
Quality: Aching Severity: 8 out of 10 Associated Symptoms: N/V/D Past History Past Medical History: 
Past Medical History:  
Diagnosis Date  Bipolar disorder ([facility] [address]  GERD (gastroesophageal reflux disease)  Schizophrenia ([facility]  Suicidal behavior Past Surgical History: 
None Family History: 
Family History Problem Relation Age of Onset  Diabetes Mother  Hypertension Father  Prostate Cancer Father  Heart Disease Father Social History: 
Social History Tobacco Use  Smoking status: Current Every Day Smoker Packs/day: 1.00  Smokeless tobacco: Never Used Substance Use Topics  Alcohol use: No  
 Drug use: No  
 
 
Allergies: Allergies Allergen Reactions  Onion Rash Review of Systems Review of Systems Gastrointestinal: Positive for abdominal pain, diarrhea, nausea and vomiting. Genitourinary: Positive for dysuria and frequency. All other systems reviewed and are negative. Physical Exam  
 
Patient Vitals for the past 12 hrs: 
 Temp Pulse Resp BP SpO2 02/18/19 1043 98.5 °F (36.9 °C) 74 16 116/77 100 % Physical Exam  
Constitutional: He is oriented to person, place, and time. He appears well-developed. HENT:  
Head: Normocephalic and atraumatic. Eyes: Conjunctivae and EOM are normal.  
Neck: Normal range of motion. Cardiovascular: Normal heart sounds. Exam reveals no gallop and no friction rub. No murmur heard. Pulmonary/Chest: Effort normal and breath sounds normal. No stridor. Abdominal: Soft. He exhibits no distension and no mass. There is tenderness. There is no rebound and no guarding. Epigastric abd pain Musculoskeletal: Normal range of motion. He exhibits no tenderness. Neurological: He is alert and oriented to person, place, and time. Skin: Skin is warm and dry. He is not diaphoretic. Psychiatric: He has a normal mood and affect. His behavior is normal.  
Nursing note and vitals reviewed. Diagnostic Study Results Labs - Recent Results (from the past 12 hour(s)) CBC WITH AUTOMATED DIFF Collection Time: 02/18/19 11:12 AM  
Result Value Ref Range WBC 5.0 4.6 - 13.2 K/uL  
 RBC 4.51 (L) 4.70 - 5.50 M/uL  
 HGB 12.9 (L) 13.0 - 16.0 g/dL HCT 37.3 36.0 - 48.0 % MCV 82.7 74.0 - 97.0 FL  
 MCH 28.6 24.0 - 34.0 PG  
 MCHC 34.6 31.0 - 37.0 g/dL  
 RDW 14.5 11.6 - 14.5 % PLATELET 950 359 - 529 K/uL MPV 10.3 9.2 - 11.8 FL  
 NEUTROPHILS 56 40 - 73 % LYMPHOCYTES 27 21 - 52 % MONOCYTES 13 (H) 3 - 10 % EOSINOPHILS 4 0 - 5 % BASOPHILS 0 0 - 2 %  
 ABS. NEUTROPHILS 2.7 1.8 - 8.0 K/UL  
 ABS. LYMPHOCYTES 1.3 0.9 - 3.6 K/UL  
 ABS. MONOCYTES 0.7 0.05 - 1.2 K/UL  
 ABS. EOSINOPHILS 0.2 0.0 - 0.4 K/UL  
 ABS. BASOPHILS 0.0 0.0 - 0.1 K/UL  
 DF AUTOMATED METABOLIC PANEL, COMPREHENSIVE Collection Time: 02/18/19 11:12 AM  
Result Value Ref Range Sodium 142 136 - 145 mmol/L Potassium 4.1 3.5 - 5.5 mmol/L  Chloride 109 (H) 100 - 108 mmol/L  
 CO2 27 21 - 32 mmol/L  
 Anion gap 6 3.0 - 18 mmol/L Glucose 96 74 - 99 mg/dL BUN 11 7.0 - 18 MG/DL Creatinine 1.18 0.6 - 1.3 MG/DL  
 BUN/Creatinine ratio 9 (L) 12 - 20 GFR est AA >60 >60 ml/min/1.73m2 GFR est non-AA >60 >60 ml/min/1.73m2 Calcium 7.9 (L) 8.5 - 10.1 MG/DL Bilirubin, total 0.4 0.2 - 1.0 MG/DL  
 ALT (SGPT) 25 16 - 61 U/L  
 AST (SGOT) 17 15 - 37 U/L Alk. phosphatase 135 (H) 45 - 117 U/L Protein, total 6.7 6.4 - 8.2 g/dL Albumin 3.3 (L) 3.4 - 5.0 g/dL Globulin 3.4 2.0 - 4.0 g/dL A-G Ratio 1.0 0.8 - 1.7 LIPASE Collection Time: 02/18/19 11:12 AM  
Result Value Ref Range Lipase 78 73 - 393 U/L  
URINALYSIS W/ RFLX MICROSCOPIC Collection Time: 02/18/19 11:15 AM  
Result Value Ref Range Color YELLOW Appearance CLEAR Specific gravity 1.014 1.005 - 1.030    
 pH (UA) 7.5 5.0 - 8.0 Protein NEGATIVE  NEG mg/dL Glucose NEGATIVE  NEG mg/dL Ketone NEGATIVE  NEG mg/dL Bilirubin NEGATIVE  NEG Blood NEGATIVE  NEG Urobilinogen 0.2 0.2 - 1.0 EU/dL Nitrites NEGATIVE  NEG Leukocyte Esterase NEGATIVE  NEG Radiologic Studies - No orders to display Medical Decision Making ED Course: Progress Notes, Reevaluation, and Consults: 
12:29 PM 
Re-evaluated the pt. Pt states he had not had an endoscopy in the past. He was supposed to see GI but was told that they do not accept his insurance. We will speak with [facility] to see if he can get an endoscopy follow up. Provider Notes (Medical Decision Making): 
Patient presents to the emergency department with acute abdominal pain. After history, physical exam, and diagnostic evaluation, the etiology for the pain is unclear. In the emergency department patient received  iv fluids, GI cocktail, pepcid. The pt's laboratory data was nondiagnostic and ua not suggestive of UTI. On serial exam patient's pain improved, there's no Cejas sign or tenderness at Mcburneys point.  There is no guarding or rebound. Will refer to  to help with GI follow up as this could be GERD/ulcer disease, and PMD for H pylori testing. Current Facility-Administered Medications Medication Dose Route Frequency Provider Last Rate Last Dose  sodium chloride 0.9 % bolus infusion 1,000 mL  1,000 mL IntraVENous Rancho Decker MD 1,000 mL/hr at 02/18/19 1112 1,000 mL at 02/18/19 1112 Current Outpatient Medications Medication Sig Dispense Refill  loratadine (CLARITIN) 10 mg tablet Take 1 Tab by mouth daily. 30 Tab 3  
 montelukast (SINGULAIR) 10 mg tablet Take 1 Tab by mouth nightly. 30 Tab 0  
 omeprazole (PRILOSEC) 40 mg capsule Take 1 Cap by mouth Daily (before breakfast). 30 Cap 1  divalproex ER (DEPAKOTE ER) 500 mg ER tablet Take 2 Tabs by mouth nightly. Indications: Bipolar disorder. (Patient taking differently: Take 500 mg by mouth nightly.) 30 Tab 1  
 traZODone (DESYREL) 100 mg tablet Take 2 Tabs by mouth nightly. Indications: insomnia. (Patient taking differently: Take 300 mg by mouth nightly.) 30 Tab 1  ARIPiprazole (ABILIFY) 10 mg tablet Take 1 Tab by mouth daily. Indications: Bipolar illness. 15 Tab 1 Vital Signs-Reviewed the patient's vital signs. Pulse Oximetry Analysis -  100% on room air (Interpretation)WNL Records Reviewed: Nursing Notes (Time of Review: 11:13 AM) 
-I am the first provider for this patient. 
-I reviewed the vital signs, available nursing notes, past medical history, past surgical history, family history and social history. Diagnosis Clinical Impression: Epigastric Pain Disposition: Discharge Follow-up Information None Medication List  
  
ASK your doctor about these medications ARIPiprazole 10 mg tablet Commonly known as:  ABILIFY Take 1 Tab by mouth daily. Indications: Bipolar illness. divalproex  mg ER tablet Commonly known as:  DEPAKOTE ER Take 2 Tabs by mouth nightly. Indications: Bipolar disorder. loratadine 10 mg tablet Commonly known as:  Amie [place] Take 1 Tab by mouth daily. montelukast 10 mg tablet Commonly known as:  SINGULAIR Take 1 Tab by mouth nightly. omeprazole 40 mg capsule Commonly known as:  PriLOSEC Take 1 Cap by mouth Daily (before breakfast). traZODone 100 mg tablet Commonly known as:  Rayma Medal Take 2 Tabs by mouth nightly. Indications: insomnia. 
  
  
 
_______________________________ Attestations: 
Scribe Attestation [address] [place] [address] acting as a scribe for and in the presence of Lorenza Lundborg, MD     
February 18, 2019 at 11:13 AM 
    
Provider Attestation:     
I personally performed the services described in the documentation, reviewed the documentation, as recorded by the scribe in my presence, and it accurately and completely records my words and actions. February 18, 2019 at 11:13 AM - Lorenza Lundborg, MD   
_______________________________

## 2019-02-18 NOTE — DISCHARGE INSTRUCTIONS
Your evaluation today showed belly pain which could be due to ulcers. Please follow up with a doctor as discussed to get the scope done. The evaluation and treatment done today requires that you follow up with a physician for re-evaluation. Not following up could result in chronic pain/disability/injury. Medical problems can change over time and symptoms can get worse or new symptoms can develop over time, therefore, it is important that you follow up as we discussed or return immedediately to the ER. Diseases don't read textbooks and there are limitations to our evaluation and testing, so please immediately return to the ER if you have any concerns. Call the ER if you have any questions about what we discussed. Please visit [employer] for discounts on any prescriptions you may have. At the [facility] Emergency Department we are genuinely concerned about your health and comfort. You may be selected to participate in a patient satisfaction survey mailed to your home. We are excited about the opportunity to learn from your experience so we may continue to improve. In striving for the very best we believe good is not good enough, but if you rate us as EXCELLENT in all boxes, we have succeeded. We strive to provide EXCELLENT care to you and your family. We appreciate the opportunity to take care of you, and hope you do well. Abdominal Pain: Care Instructions  Your Care Instructions    Abdominal pain has many possible causes. Some aren't serious and get better on their own in a few days. Others need more testing and treatment. If your pain continues or gets worse, you need to be rechecked and may need more tests to find out what is wrong. You may need surgery to correct the problem. Don't ignore new symptoms, such as fever, nausea and vomiting, urination problems, pain that gets worse, and dizziness. These may be signs of a more serious problem.   Your doctor may have recommended a follow-up visit in the next 8 to 12 hours. If you are not getting better, you may need more tests or treatment. The doctor has checked you carefully, but problems can develop later. If you notice any problems or new symptoms, get medical treatment right away. Follow-up care is a key part of your treatment and safety. Be sure to make and go to all appointments, and call your doctor if you are having problems. It's also a good idea to know your test results and keep a list of the medicines you take. How can you care for yourself at home? · Rest until you feel better. · To prevent dehydration, drink plenty of fluids, enough so that your urine is light yellow or clear like water. Choose water and other caffeine-free clear liquids until you feel better. If you have kidney, heart, or liver disease and have to limit fluids, talk with your doctor before you increase the amount of fluids you drink. · If your stomach is upset, eat mild foods, such as rice, dry toast or crackers, bananas, and applesauce. Try eating several small meals instead of two or three large ones. · Wait until 48 hours after all symptoms have gone away before you have spicy foods, alcohol, and drinks that contain caffeine. · Do not eat foods that are high in fat. · Avoid anti-inflammatory medicines such as aspirin, ibuprofen (Advil, Motrin), and naproxen (Aleve). These can cause stomach upset. Talk to your doctor if you take daily aspirin for another health problem. When should you call for help? Call 911 anytime you think you may need emergency care.  For example, call if:    · You passed out (lost consciousness).     · You pass maroon or very bloody stools.     · You vomit blood or what looks like coffee grounds.     · You have new, severe belly pain.    Call your doctor now or seek immediate medical care if:    · Your pain gets worse, especially if it becomes focused in one area of your belly.     · You have a new or higher fever.     · Your stools are black and look like tar, or they have streaks of blood.     · You have unexpected vaginal bleeding.     · You have symptoms of a urinary tract infection. These may include:  ? Pain when you urinate. ? Urinating more often than usual.  ? Blood in your urine.     · You are dizzy or lightheaded, or you feel like you may faint.    Watch closely for changes in your health, and be sure to contact your doctor if:    · You are not getting better after 1 day (24 hours). Where can you learn more? Go to http://martha-margaret.info/. Enter L323 in the search box to learn more about \"Abdominal Pain: Care Instructions. \"  Current as of: September 23, 2018  Content Version: 11.9  © 2672-7234 [employer]. Care instructions adapted under license by [employer] (which disclaims liability or warranty for this information). If you have questions about a medical condition or this instruction, always ask your healthcare professional. [place] [address] any warranty or liability for your use of this information.

## 2019-02-18 NOTE — ED TRIAGE NOTES
\"Since Thursday I've been having stomach pain, and vomiting. I haven't been able to eat because of the pain. \"

## 2019-02-19 NOTE — ED NOTES
LC received a page to assist pt. with endoscopy follow up. I called to assess pt. Needs. Pt. Stated he already found a specialist that accepts his insurance. Pt. Stated [facility] service are no longer needed.

## 2019-03-01 DIAGNOSIS — R10.13 EPIGASTRIC PAIN: ICD-10-CM

## 2019-03-04 RX ORDER — OMEPRAZOLE 40 MG/1
CAPSULE, DELAYED RELEASE ORAL
Qty: 30 CAP | Refills: 1 | Status: SHIPPED | OUTPATIENT
Start: 2019-03-04 | End: 2019-06-25

## 2019-06-25 ENCOUNTER — HOSPITAL ENCOUNTER (EMERGENCY)
Age: 49
Discharge: HOME | End: 2019-06-25
Attending: EMERGENCY MEDICINE
Payer: MEDICAID

## 2019-06-25 VITALS
RESPIRATION RATE: 16 BRPM | HEART RATE: 56 BPM | WEIGHT: 254 LBS | DIASTOLIC BLOOD PRESSURE: 78 MMHG | OXYGEN SATURATION: 100 % | SYSTOLIC BLOOD PRESSURE: 148 MMHG | BODY MASS INDEX: 35.43 KG/M2 | TEMPERATURE: 98.4 F

## 2019-06-25 DIAGNOSIS — S39.012A STRAIN OF LUMBAR REGION, INITIAL ENCOUNTER: Primary | ICD-10-CM

## 2019-06-25 DIAGNOSIS — M54.32 BILATERAL SCIATICA: ICD-10-CM

## 2019-06-25 DIAGNOSIS — M54.31 BILATERAL SCIATICA: ICD-10-CM

## 2019-06-25 PROCEDURE — 74011250637 HC RX REV CODE- 250/637: Performed by: EMERGENCY MEDICINE

## 2019-06-25 PROCEDURE — 99284 EMERGENCY DEPT VISIT MOD MDM: CPT

## 2019-06-25 RX ORDER — IBUPROFEN 600 MG/1
600 TABLET ORAL
Qty: 18 TAB | Refills: 0 | Status: SHIPPED | OUTPATIENT
Start: 2019-06-25 | End: 2019-11-13

## 2019-06-25 RX ORDER — OXYCODONE AND ACETAMINOPHEN 5; 325 MG/1; MG/1
1 TABLET ORAL
Status: COMPLETED | OUTPATIENT
Start: 2019-06-25 | End: 2019-06-25

## 2019-06-25 RX ORDER — CYCLOBENZAPRINE HCL 10 MG
10 TABLET ORAL
Status: COMPLETED | OUTPATIENT
Start: 2019-06-25 | End: 2019-06-25

## 2019-06-25 RX ORDER — CYCLOBENZAPRINE HCL 5 MG
5-10 TABLET ORAL
Qty: 22 TAB | Refills: 0 | Status: SHIPPED | OUTPATIENT
Start: 2019-06-25 | End: 2019-07-01 | Stop reason: ALTCHOICE

## 2019-06-25 RX ORDER — OXYCODONE AND ACETAMINOPHEN 5; 325 MG/1; MG/1
1 TABLET ORAL
Qty: 14 TAB | Refills: 0 | Status: SHIPPED | OUTPATIENT
Start: 2019-06-25 | End: 2019-07-01 | Stop reason: ALTCHOICE

## 2019-06-25 RX ADMIN — OXYCODONE HYDROCHLORIDE AND ACETAMINOPHEN 1 TABLET: 5; 325 TABLET ORAL at 06:40

## 2019-06-25 RX ADMIN — CYCLOBENZAPRINE HYDROCHLORIDE 10 MG: 10 TABLET, FILM COATED ORAL at 06:40

## 2019-06-25 NOTE — DISCHARGE INSTRUCTIONS
Return for pain, fever not resolving with motrin or tylenol, shortness of breath, vomiting, decreased fluid intake, weakness, numbness, dizziness, any urinary or bowel changes, or any change or concerns. Patient Education        Back Strain: Care Instructions  Overview    A back strain happens when you overstretch, or pull, a muscle in your back. You may hurt your back in an accident or when you exercise or lift something. Sometimes you may not know how you hurt your back. Most back pain will get better with rest and time. You can take care of yourself at home to help your back heal.  Follow-up care is a key part of your treatment and safety. Be sure to make and go to all appointments, and call your doctor if you are having problems. It's also a good idea to know your test results and keep a list of the medicines you take. How can you care for yourself at home? · Try to stay as active as you can, but stop or reduce any activity that causes pain. · Put ice or a cold pack on the sore muscle for 10 to 20 minutes at a time to stop swelling. Try this every 1 to 2 hours for 3 days (when you are awake) or until the swelling goes down. Put a thin cloth between the ice pack and your skin. · After 2 or 3 days, apply a heating pad on low or a warm cloth to your back. Some doctors suggest that you go back and forth between hot and cold treatments. · Take pain medicines exactly as directed. ? If the doctor gave you a prescription medicine for pain, take it as prescribed. ? If you are not taking a prescription pain medicine, ask your doctor if you can take an over-the-counter medicine. · Try sleeping on your side with a pillow between your legs. Or put a pillow under your knees when you lie on your back. These measures can ease pain in your lower back. · Return to your usual level of activity slowly. When should you call for help? Call 911 anytime you think you may need emergency care.  For example, call if:    · You are unable to move a leg at all.   [facility] your doctor now or seek immediate medical care if:    · You have new or worse symptoms in your legs, belly, or buttocks. Symptoms may include:  ? Numbness or tingling. ? Weakness. ? Pain.     · You lose bladder or bowel control.    Watch closely for changes in your health, and be sure to contact your doctor if:    · You have a fever, lose weight, or don't feel well.     · You are not getting better as expected. Where can you learn more? Go to http://martha-margaret.info/. Enter D598 in the search box to learn more about \"Back Strain: Care Instructions. \"  Current as of: September 20, 2018  Content Version: 11.9  © 5657-2969 [employer]. Care instructions adapted under license by [employer] (which disclaims liability or warranty for this information). If you have questions about a medical condition or this instruction, always ask your healthcare professional. [place] [address] any warranty or liability for your use of this information. Patient Education        Sciatica: Care Instructions  Your Care Instructions    Sciatica (say \"mjn-XR-na-kuh\") is an irritation of one of the sciatic nerves, which come from the spinal cord in the lower back. The sciatic nerves and their branches extend down through the buttock to the foot. Sciatica can develop when an injured disc in the back presses against a spinal nerve root. Its main symptom is pain, numbness, or weakness that is often worse in the leg or foot than in the back. Sciatica often will improve and go away with time. Early treatment usually includes medicines and exercises to relieve pain. Follow-up care is a key part of your treatment and safety. Be sure to make and go to all appointments, and call your doctor if you are having problems. It's also a good idea to know your test results and keep a list of the medicines you take.   How can you care for yourself at home? · Take pain medicines exactly as directed. ? If the doctor gave you a prescription medicine for pain, take it as prescribed. ? If you are not taking a prescription pain medicine, ask your doctor if you can take an over-the-counter medicine. · Use heat or ice to relieve pain. ? To apply heat, put a warm water bottle, heating pad set on low, or warm cloth on your back. Do not go to sleep with a heating pad on your skin. ? To use ice, put ice or a cold pack on the area for 10 to 20 minutes at a time. Put a thin cloth between the ice and your skin. · Avoid sitting if possible, unless it feels better than standing. · Alternate lying down with short walks. Increase your walking distance as you are able to without making your symptoms worse. · Do not do anything that makes your symptoms worse. When should you call for help? Call 911 anytime you think you may need emergency care. For example, call if:    · You are unable to move a leg at all.   [facility] your doctor now or seek immediate medical care if:    · You have new or worse symptoms in your legs or buttocks. Symptoms may include:  ? Numbness or tingling. ? Weakness. ? Pain.     · You lose bladder or bowel control.    Watch closely for changes in your health, and be sure to contact your doctor if:    · You are not getting better as expected. Where can you learn more? Go to http://martha-margaret.info/. Enter [phone] in the search box to learn more about \"Sciatica: Care Instructions. \"  Current as of: September 20, 2018  Content Version: 11.9  © 4066-3698 [employer]. Care instructions adapted under license by [employer] (which disclaims liability or warranty for this information). If you have questions about a medical condition or this instruction, always ask your healthcare professional. [address] any warranty or liability for your use of this information.

## 2019-06-25 NOTE — ED PROVIDER NOTES
Pt c/o b/l low back pain. Says started yesterday while pushing a  and trying hard to turn it in thick grass. H/o sim pain in past but not for few yrs. Pain mod at rest, severe when trying to bend forward. Radiates down both legs w bending also. No abd pain, no chest or upper back pain. No weakness or numbness. No diff or incontinence w urination or bowel movements. No dir injury. No rash. Took motrin pta, no sig improvement. Pain sim to mult prior episodes of sciatica per pt. Past Medical History:   Diagnosis Date    Bipolar disorder ([address]     GERD (gastroesophageal reflux disease)     [facility]     Suicidal behavior        History reviewed. No pertinent surgical history.       Family History:   Problem Relation Age of Onset    Diabetes Mother     Hypertension Father     Prostate Cancer Father     Heart Disease Father        Social History     Socioeconomic History    Marital status: UNKNOWN     Spouse name: Not on file    Number of children: Not on file    Years of education: Not on file    Highest education level: Not on file   Occupational History    Not on file   Social Needs    Financial resource strain: Not on file    Food insecurity:     Worry: Not on file     Inability: Not on file    Transportation needs:     Medical: Not on file     Non-medical: Not on file   Tobacco Use    Smoking status: Current Every Day Smoker     Packs/day: 1.00    Smokeless tobacco: Never Used   Substance and Sexual Activity    Alcohol use: No    Drug use: No    Sexual activity: Never   Lifestyle    Physical activity:     Days per week: Not on file     Minutes per session: Not on file    Stress: Not on file   Relationships    Social connections:     Talks on phone: Not on file     Gets together: Not on file     Attends Zoroastrianism service: Not on file     Active member of club or organization: Not on file     Attends meetings of clubs or organizations: Not on file Relationship status: Not on file    Intimate partner violence:     Fear of current or ex partner: Not on file     Emotionally abused: Not on file     Physically abused: Not on file     Forced sexual activity: Not on file   Other Topics Concern    Not on file   Social History Narrative    Not on file         ALLERGIES: Onion    Review of Systems   Constitutional: Negative for diaphoresis and fever. HENT: Negative for congestion. Respiratory: Negative for cough and shortness of breath. Cardiovascular: Negative for chest pain. Gastrointestinal: Negative for abdominal pain and nausea. Genitourinary: Negative for difficulty urinating and frequency. Musculoskeletal: Positive for back pain. Skin: Negative for rash. Neurological: Negative for dizziness and weakness. All other systems reviewed and are negative. Vitals:    06/25/19 0623   BP: 148/78   Pulse: (!) 56   Resp: 16   Temp: 98.4 °F (36.9 °C)   SpO2: 100%   Weight: 115.2 kg (254 lb)            Physical Exam   Constitutional: He is oriented to person, place, and time. He appears well-developed. HENT:   Head: Normocephalic and atraumatic. Eyes: Conjunctivae are normal.   Neck: Normal range of motion. Cardiovascular: Normal rate and regular rhythm. Pulmonary/Chest: Effort normal. He has no wheezes. Abdominal: Soft. There is no tenderness. Musculoskeletal: He exhibits tenderness (+ paralumbar spasm/ttp. no erythema/swelling. no upper back pain. no leg ttp/swelling. + slr 45 degrees. nvi. ). Neurological: He is alert and oriented to person, place, and time. Skin: Skin is warm and dry. Capillary refill takes less than 2 seconds. No rash noted. Psychiatric: He has a normal mood and affect. Nursing note and vitals reviewed. [facility]       Procedures    Vitals:  No data found.       Medications ordered:   Medications   oxyCODONE-acetaminophen (PERCOCET) 5-325 mg per tablet 1 Tab (1 Tab Oral Given 6/25/19 0640) cyclobenzaprine (FLEXERIL) tablet 10 mg (10 mg Oral Given 6/25/19 0640)         Lab findings:  No results found for this or any previous visit (from the past 12 hour(s)). X-Ray, CT or other radiology findings or impressions:  No orders to display       Progress notes, Consult notes or additional Procedure notes:   No emc. Not c/w cord compression/cauda equina/fracture/epidural abscess/acute abdomen/dvt/aaa. Stable for discharge and close follow-up. Patient agrees w discharge plan and verbalizes understanding of detailed return inst given. No indication for urgent mri or other imaging. Diagnosis:   1. Strain of lumbar region, initial encounter    2. Bilateral sciatica        Disposition: home    Follow-up Information     Follow up With Specialties Details Why Contact Info    Iris Collins NP Nurse Practitioner Schedule an appointment as soon as possible for a visit in 2 days  [address]  [address]  [address]  [phone]             Discharge Medication List as of 6/25/2019  6:52 AM      START taking these medications    Details   cyclobenzaprine (FLEXERIL) 5 mg tablet Take 1-2 Tabs by mouth three (3) times daily as needed for Muscle Spasm(s) for up to 7 days. , Print, Disp-22 Tab, R-0      ibuprofen (MOTRIN) 600 mg tablet Take 1 Tab by mouth every six (6) hours as needed for Pain., Print, Disp-18 Tab, R-0      oxyCODONE-acetaminophen (PERCOCET) 5-325 mg per tablet Take 1 Tab by mouth every six (6) hours as needed for Pain for up to 7 days. Max Daily Amount: 4 Tabs., Print, Disp-14 Tab, R-0         CONTINUE these medications which have NOT CHANGED    Details   divalproex ER (DEPAKOTE ER) 500 mg ER tablet Take 2 Tabs by mouth nightly. Indications: Bipolar disorder. , Print, Disp-30 Tab, R-1      traZODone (DESYREL) 100 mg tablet Take 2 Tabs by mouth nightly. Indications: insomnia., Normal, Disp-30 Tab, R-1      ARIPiprazole (ABILIFY) 10 mg tablet Take 1 Tab by mouth daily.  Indications: Bipolar illness. , Print, Disp-15 Tab, R-1

## 2019-07-01 ENCOUNTER — OFFICE VISIT (OUTPATIENT)
Dept: FAMILY MEDICINE CLINIC | Age: 49
End: 2019-07-01

## 2019-07-01 VITALS
BODY MASS INDEX: 32.06 KG/M2 | OXYGEN SATURATION: 97 % | DIASTOLIC BLOOD PRESSURE: 77 MMHG | RESPIRATION RATE: 16 BRPM | SYSTOLIC BLOOD PRESSURE: 123 MMHG | HEIGHT: 71 IN | HEART RATE: 89 BPM | TEMPERATURE: 98.3 F | WEIGHT: 229 LBS

## 2019-07-01 DIAGNOSIS — M54.41 ACUTE BILATERAL LOW BACK PAIN WITH BILATERAL SCIATICA: Primary | ICD-10-CM

## 2019-07-01 DIAGNOSIS — M54.42 ACUTE BILATERAL LOW BACK PAIN WITH BILATERAL SCIATICA: Primary | ICD-10-CM

## 2019-07-01 RX ORDER — TIZANIDINE 4 MG/1
4 TABLET ORAL
Qty: 30 TAB | Refills: 0 | Status: SHIPPED | OUTPATIENT
Start: 2019-07-01 | End: 2020-04-06 | Stop reason: SDUPTHER

## 2019-07-01 RX ORDER — TRAMADOL HYDROCHLORIDE 50 MG/1
50 TABLET ORAL
Qty: 30 TAB | Refills: 0 | Status: SHIPPED | OUTPATIENT
Start: 2019-07-01 | End: 2019-07-08

## 2019-07-01 RX ORDER — PREDNISONE 10 MG/1
TABLET ORAL
Qty: 21 TAB | Refills: 0 | Status: SHIPPED | OUTPATIENT
Start: 2019-07-01 | End: 2019-11-13

## 2019-07-01 NOTE — PATIENT INSTRUCTIONS

## 2019-07-01 NOTE — PROGRESS NOTES
Pt is here for follow up from ED back pain x 1 week. 1. Have you been to the ER, urgent care clinic since your last visit? Hospitalized since your last visit? [facility] 6/26/19    2. Have you seen or consulted any other health care providers outside of the [address] [place] [address] since your last visit? Include any pap smears or colon screening.  No

## 2019-07-01 NOTE — PROGRESS NOTES
HISTORY OF PRESENT ILLNESS  Melody Arevalo is a 52 y.o. male. HPI   Patient presents today to follow upon back pain. States he was cutting grass and when he turned he felt a pull. States pain started in his lower back and radiating down bilateral lower extremities. Denies any weakness. Patient was seen in the ED on 6/25/19 for same. Reports he was prescribe percocet, flexeril and tramadol. Reports pain has not improved with taking medication. States he has been taking 2 tabs of flexeril and 4-5 tabs of ibuprofen 600 mg due to pain. Instructed patient on dangers of taking medications not as prescribed. Allergies   Allergen Reactions    Onion Rash     Current Outpatient Medications   Medication Sig Dispense Refill    cyclobenzaprine (FLEXERIL) 5 mg tablet Take 1-2 Tabs by mouth three (3) times daily as needed for Muscle Spasm(s) for up to 7 days. 22 Tab 0    ibuprofen (MOTRIN) 600 mg tablet Take 1 Tab by mouth every six (6) hours as needed for Pain. 18 Tab 0    divalproex ER (DEPAKOTE ER) 500 mg ER tablet Take 2 Tabs by mouth nightly. Indications: Bipolar disorder. (Patient taking differently: Take 500 mg by mouth nightly.) 30 Tab 1    traZODone (DESYREL) 100 mg tablet Take 2 Tabs by mouth nightly. Indications: insomnia. (Patient taking differently: Take 300 mg by mouth nightly.) 30 Tab 1    ARIPiprazole (ABILIFY) 10 mg tablet Take 1 Tab by mouth daily. Indications: Bipolar illness. 15 Tab 1    oxyCODONE-acetaminophen (PERCOCET) 5-325 mg per tablet Take 1 Tab by mouth every six (6) hours as needed for Pain for up to 7 days. Max Daily Amount: 4 Tabs.  14 Tab 0     Past Medical History:   Diagnosis Date    Bipolar disorder ([address]     GERD (gastroesophageal reflux disease)     Schizophrenia ([facility]     Suicidal behavior      Social History     Socioeconomic History    Marital status: UNKNOWN     Spouse name: Not on file    Number of children: Not on file    Years of education: Not on file  Highest education level: Not on file   Occupational History    Not on file   Social Needs    Financial resource strain: Not on file    Food insecurity:     Worry: Not on file     Inability: Not on file    Transportation needs:     Medical: Not on file     Non-medical: Not on file   Tobacco Use    Smoking status: Current Every Day Smoker     Packs/day: 1.00    Smokeless tobacco: Never Used   Substance and Sexual Activity    Alcohol use: No    Drug use: No    Sexual activity: Never   Lifestyle    Physical activity:     Days per week: Not on file     Minutes per session: Not on file    Stress: Not on file   Relationships    Social connections:     Talks on phone: Not on file     Gets together: Not on file     Attends Hinduism service: Not on file     Active member of club or organization: Not on file     Attends meetings of clubs or organizations: Not on file     Relationship status: Not on file    Intimate partner violence:     Fear of current or ex partner: Not on file     Emotionally abused: Not on file     Physically abused: Not on file     Forced sexual activity: Not on file   Other Topics Concern    Not on file   Social History Narrative    Not on file     Wt Readings from Last 3 Encounters:   07/01/19 229 lb (103.9 kg)   06/25/19 254 lb (115.2 kg)   02/18/19 250 lb (113.4 kg)     BP Readings from Last 3 Encounters:   07/01/19 123/77   06/25/19 148/78   02/18/19 124/79     Review of Systems   Musculoskeletal: Positive for back pain. /77 (BP 1 Location: Left arm)   Pulse 89   Temp 98.3 °F (36.8 °C) (Oral)   Resp 16   Ht 5' 11\" (1.803 m)   Wt 229 lb (103.9 kg)   SpO2 97%   BMI 31.94 kg/m²      Physical Exam   Constitutional: He appears well-developed and well-nourished. HENT:   Head: Normocephalic and atraumatic. Neck: Normal range of motion. Neck supple. Cardiovascular: Normal rate, regular rhythm and normal heart sounds. Exam reveals no gallop and no friction rub.    No murmur heard. Pulmonary/Chest: Effort normal and breath sounds normal. He has no wheezes. He has no rhonchi. He has no rales. Musculoskeletal:        Lumbar back: He exhibits tenderness. Musculoskeletal: point tenderness along paraspinals bilaterally lumbar sacral region. +bilateral straight leg test.  DTRs patellafemoral +1 bilaterally no antalgic gait. Lymphadenopathy:     He has no cervical adenopathy. ASSESSMENT and PLAN    ICD-10-CM ICD-9-CM    1. Acute bilateral low back pain with bilateral sciatica M54.42 724.2 traMADol (ULTRAM) 50 mg tablet    M54.41 724.3 XR SPINE LUMB COMP W BEND     Orders Placed This Encounter    predniSONE (STERAPRED DS) 10 mg dose pack    traMADol (ULTRAM) 50 mg tablet    tiZANidine (ZANAFLEX) 4 mg tablet       Discontinue flexeril and begin zanaflex  I have reviewed the patients controlled substance prescription history, as maintained in the [facility]. There is no suspicious activity noted. Usage is consistent with current use of prescribed medications. alarm signs when to seek emergent care provided and reviewed   I have discussed the diagnosis with the patient and the intended plan as seen in the above orders. The patient has received an after-visit summary and questions were answered concerning future plans. I have discussed medication side effects and warnings with the patient as well. Patient agreeable with above plan and verbalizes understanding. Follow-up and Dispositions    · Return in about 2 weeks (around 7/15/2019) for back pain .

## 2020-02-03 ENCOUNTER — IMPORTED ENCOUNTER (OUTPATIENT)
Dept: URBAN - METROPOLITAN AREA CLINIC 1 | Facility: CLINIC | Age: 50
End: 2020-02-03

## 2020-02-03 PROBLEM — H52.4: Noted: 2020-02-03

## 2020-02-03 PROCEDURE — 92004 COMPRE OPH EXAM NEW PT 1/>: CPT

## 2020-02-03 PROCEDURE — 92015 DETERMINE REFRACTIVE STATE: CPT

## 2020-02-03 NOTE — PATIENT DISCUSSION
1.  Presbyopia: Rx was given for corrective spectacles if indicated. Return for an appointment in 1 year 36 with Dr. Joni Schaumann.

## 2020-04-06 ENCOUNTER — VIRTUAL VISIT (OUTPATIENT)
Dept: FAMILY MEDICINE CLINIC | Age: 50
End: 2020-04-06

## 2020-04-06 DIAGNOSIS — M54.41 ACUTE BILATERAL LOW BACK PAIN WITH BILATERAL SCIATICA: Primary | ICD-10-CM

## 2020-04-06 DIAGNOSIS — M54.42 ACUTE BILATERAL LOW BACK PAIN WITH BILATERAL SCIATICA: Primary | ICD-10-CM

## 2020-04-06 RX ORDER — TIZANIDINE 4 MG/1
4 TABLET ORAL
Qty: 30 TAB | Refills: 0 | Status: SHIPPED | OUTPATIENT
Start: 2020-04-06 | End: 2020-11-09

## 2020-04-06 RX ORDER — IBUPROFEN 800 MG/1
800 TABLET ORAL
Qty: 45 TAB | Refills: 0 | Status: SHIPPED | OUTPATIENT
Start: 2020-04-06 | End: 2020-05-01 | Stop reason: SDUPTHER

## 2020-04-06 NOTE — PROGRESS NOTES
Consent: Madeline Lockwood, who was seen by synchronous (real-time) audio-video technology, and/or his healthcare decision maker, is aware that this patient-initiated, Telehealth encounter on 4/6/2020 is a billable service, with coverage as determined by his insurance carrier. He is aware that he may receive a bill and has provided verbal consent to proceed: Yes.    1. Have you been to the ER, urgent care clinic since your last visit? Hospitalized since your last visit? No    2. Have you seen or consulted any other health care providers outside of the [address] [place] [address] since your last visit? Include any pap smears or colon screening. No    Assessment & Plan:   Diagnoses and all orders for this visit:    1. Acute bilateral low back pain with bilateral sciatica    Other orders  -     tiZANidine (ZANAFLEX) 4 mg tablet; Take 1 Tab by mouth three (3) times daily as needed for Pain (not to exceed 3 tabs in 24hrs). -     ibuprofen (MOTRIN) 800 mg tablet; Take 1 Tab by mouth every eight (8) hours as needed for Pain (take with food, not to exceed 3 tabs in 24hrs). symptomatic relief discussed     I spent at least 10 minutes with this established patient, and >50% of the time was spent counseling and/or coordinating care regarding back pain  712  Subjective:   Madeline Lockwood is a 52 y.o. male who was seen for Back Pain (radiating down to bilateral feet)    Patient states for the last week he has been having lower back pain radiating down to bottom of bilateral feet. States he was working with a [employer] [address] last Monday 3/30/2020 using weed eater and back 'gave out'. Has been taking otc ibuprofen and aleve without improvement. Also tried taking a hot shower without improvement. Reports he has to crawl out of the bed just to stand up in the morning. Denies weakness or falling. Prior to Admission medications    Medication Sig Start Date End Date Taking?  Authorizing Provider   tiZANidine ([place]) 4 mg tablet Take 1 Tab by mouth three (3) times daily as needed for Pain. 7/1/19   Christiano ARNOLD NP   divalproex ER (DEPAKOTE ER) 500 mg ER tablet Take 2 Tabs by mouth nightly. Indications: Bipolar disorder. Patient taking differently: Take 500 mg by mouth nightly. 10/9/18   Natalio Narayan MD   traZODone (DESYREL) 100 mg tablet Take 2 Tabs by mouth nightly. Indications: insomnia. Patient taking differently: Take 300 mg by mouth nightly. 10/9/18   Natalio Narayan MD   ARIPiprazole (ABILIFY) 10 mg tablet Take 1 Tab by mouth daily. Indications: Bipolar illness. 10/10/18   Natalio Narayan MD     Allergies   Allergen Reactions    Onion Rash     Patient Active Problem List   Diagnosis Code    Debility R53.81    B[facility] 1 disorder ([facility] F31.9    SOB (shortness of breath) on exertion R06.02    Bronchitis due to tobacco use J40, Z72.0    Abnormal CT scan of lung R91.8    Left inguinal hernia K40.90    Gynecomastia, male N58    Depression F32.9    Cocaine abuse with cocaine-induced mood disorder ([facility] F14.14     Patient Active Problem List    Diagnosis Date Noted    Cocaine abuse with cocaine-induced mood disorder ([facility] 10/05/2018     Priority: 2 - Two    Depression 10/04/2018    Gynecomastia, male 05/23/2018    Left inguinal hernia 01/12/2018    SOB (shortness of breath) on exertion 11/01/2017    Bronchitis due to tobacco use 11/01/2017    Abnormal CT scan of lung 11/01/2017    Bipolar 1 disorder ([facility] 12/26/2016    Debility 12/25/2016     Current Outpatient Medications   Medication Sig Dispense Refill    divalproex ER (DEPAKOTE ER) 500 mg ER tablet Take 2 Tabs by mouth nightly. Indications: Bipolar disorder. (Patient taking differently: Take 500 mg by mouth nightly.) 30 Tab 1    traZODone (DESYREL) 100 mg tablet Take 2 Tabs by mouth nightly. Indications: insomnia.  (Patient taking differently: Take 300 mg by mouth nightly.) 30 Tab 1    ARIPiprazole (ABILIFY) 10 mg tablet Take 1 Tab by mouth daily. Indications: Bipolar illness. 15 Tab 1    tiZANidine (ZANAFLEX) 4 mg tablet Take 1 Tab by mouth three (3) times daily as needed for Pain. 30 Tab 0     Allergies   Allergen Reactions    Onion Rash     Past Medical History:   Diagnosis Date    [facility] ([facility]     GERD (gastroesophageal reflux disease)     Schizophrenia ([facility]     Suicidal behavior      No past surgical history on file. Family History   Problem Relation Age of Onset    Diabetes Mother     Hypertension Father     Prostate Cancer Father     Heart Disease Father      Social History     Tobacco Use    Smoking status: Current Every Day Smoker     Packs/day: 1.00    Smokeless tobacco: Never Used   Substance Use Topics    Alcohol use: Yes     Comment: \"drunk everyday\"       Review of Systems   Musculoskeletal: Positive for back pain (lower back). Neurological: Negative for tingling and weakness. Objective: There were no vitals taken for this visit. General: alert, cooperative, no distress   Mental  status: normal mood, behavior, speech, dress, motor activity, and thought processes, able to follow commands   HENT: NCAT   Neck: no visualized mass   Resp: no respiratory distress   Neuro: no gross deficits   Skin: no discoloration or lesions of concern on visible areas   Psychiatric: normal affect, consistent with stated mood, no evidence of hallucinations     Additional exam findings: We discussed the expected course, resolution and complications of the diagnosis(es) in detail. Medication risks, benefits, costs, interactions, and alternatives were discussed as indicated. I advised him to contact the office if his condition worsens, changes or fails to improve as anticipated. He expressed understanding with the diagnosis(es) and plan. Radha Ness is a 52 y.o. male being evaluated by a video visit encounter for concerns as above. A caregiver was present when appropriate.  Due to this being a TeleHealth encounter (During FKJVN-54 public health emergency), evaluation of the following organ systems was limited: Vitals/Constitutional/EENT/Resp/CV/GI//MS/Neuro/Skin/Heme-Lymph-Imm. Pursuant to the emergency declaration under the [facility], [facility] waiver authority and the [employer] Act, this Virtual  Visit was conducted, with patient's (and/or legal guardian's) consent, to reduce the patient's risk of exposure to COVID-19 and provide necessary medical care. Services were provided through a video synchronous discussion virtually to substitute for in-person clinic visit. Patient and provider were located at their individual homes.         Shellie Tao NP

## 2020-05-01 ENCOUNTER — VIRTUAL VISIT (OUTPATIENT)
Dept: FAMILY MEDICINE CLINIC | Age: 50
End: 2020-05-01

## 2020-05-01 DIAGNOSIS — M54.41 ACUTE BILATERAL LOW BACK PAIN WITH BILATERAL SCIATICA: Primary | ICD-10-CM

## 2020-05-01 DIAGNOSIS — M54.42 ACUTE BILATERAL LOW BACK PAIN WITH BILATERAL SCIATICA: Primary | ICD-10-CM

## 2020-05-01 RX ORDER — IBUPROFEN 800 MG/1
800 TABLET ORAL
Qty: 45 TAB | Refills: 0 | Status: SHIPPED | OUTPATIENT
Start: 2020-05-01 | End: 2020-05-15 | Stop reason: SDUPTHER

## 2020-05-01 RX ORDER — OXYCODONE AND ACETAMINOPHEN 5; 325 MG/1; MG/1
1 TABLET ORAL
Qty: 12 TAB | Refills: 0 | Status: SHIPPED | OUTPATIENT
Start: 2020-05-01 | End: 2020-05-04

## 2020-05-01 NOTE — PROGRESS NOTES
Long Zelaya is a 52 y.o. male who was seen by synchronous (real-time) audio-video technology on 5/1/2020. Consent: Long Zelaya, who was seen by synchronous (real-time) audio-video technology, and/or his healthcare decision maker, is aware that this patient-initiated, Telehealth encounter on 5/1/2020 is a billable service, with coverage as determined by his insurance carrier. He is aware that he may receive a bill and has provided verbal consent to proceed: Yes. Assessment & Plan:   Diagnoses and all orders for this visit:    1. Acute bilateral low back pain with bilateral sciatica  -     oxyCODONE-acetaminophen (PERCOCET) 5-325 mg per tablet; Take 1 Tab by mouth every six (6) hours as needed for Pain for up to 3 days. Max Daily Amount: 4 Tabs. Other orders  -     ibuprofen (MOTRIN) 800 mg tablet; Take 1 Tab by mouth every eight (8) hours as needed for Pain (take with food, not to exceed 3 tabs in 24hrs). Advised to have previously ordered xrays performed. Informed limited quantity of percocet as above and no refills will be authorized. Patient verbalizes understanding. I spent at least 16 minutes on this visit with this established patient. [phone]  Subjective:   Long Zelaya is a 52 y.o. male who was seen for Back Pain    Patient states he continues to have lower back pain since 3/30/2020. Reports he did take zanaflex and ibuprofen without improvement. Comments sitting/standing and working increases pain. States he feels the pain most when he is working, works as a . Denies weakness in lower extremities. States he has taken percocet in the past for pain with improvement. Prior to Admission medications    Medication Sig Start Date End Date Taking? Authorizing Provider   tiZANidine (ZANAFLEX) 4 mg tablet Take 1 Tab by mouth three (3) times daily as needed for Pain (not to exceed 3 tabs in 24hrs).  4/6/20   Malcolm ARNOLD NP   ibuprofen (MOTRIN) 800 mg tablet Take 1 Tab by mouth every eight (8) hours as needed for Pain (take with food, not to exceed 3 tabs in 24hrs). 4/6/20   Anjel ARNOLD NP   divalproex ER (DEPAKOTE ER) 500 mg ER tablet Take 2 Tabs by mouth nightly. Indications: Bipolar disorder. Patient taking differently: Take 500 mg by mouth nightly. 10/9/18   Kirsten Kirk MD   traZODone (DESYREL) 100 mg tablet Take 2 Tabs by mouth nightly. Indications: insomnia. Patient taking differently: Take 300 mg by mouth nightly. 10/9/18   Kirsten Kirk MD   ARIPiprazole (ABILIFY) 10 mg tablet Take 1 Tab by mouth daily. Indications: Bipolar illness. 10/10/18   Kirsten Kirk MD     Allergies   Allergen Reactions    Onion Rash     Patient Active Problem List   Diagnosis Code    Debility R53.81    Bipolar 1 disorder ([facility] F31.9    SOB (shortness of breath) on exertion R06.02    Bronchitis due to tobacco use J40, Z72.0    Abnormal CT scan of lung R91.8    Left inguinal hernia K40.90    Gynecomastia, male N58    Depression F32.9    Cocaine abuse with cocaine-induced mood disorder ([facility] F14.14     Patient Active Problem List    Diagnosis Date Noted    Cocaine abuse with cocaine-induced mood disorder ([facility] 10/05/2018     Priority: 2 - Two    Depression 10/04/2018    Gynecomastia, male 05/23/2018    Left inguinal hernia 01/12/2018    SOB (shortness of breath) on exertion 11/01/2017    Bronchitis due to tobacco use 11/01/2017    Abnormal CT scan of lung 11/01/2017    Bipolar 1 disorder ([facility] 12/26/2016    Debility 12/25/2016     Current Outpatient Medications   Medication Sig Dispense Refill    tiZANidine (ZANAFLEX) 4 mg tablet Take 1 Tab by mouth three (3) times daily as needed for Pain (not to exceed 3 tabs in 24hrs). 30 Tab 0    ibuprofen (MOTRIN) 800 mg tablet Take 1 Tab by mouth every eight (8) hours as needed for Pain (take with food, not to exceed 3 tabs in 24hrs).  45 Tab 0    divalproex ER (DEPAKOTE ER) 500 mg ER tablet Take 2 Tabs by mouth nightly. Indications: Bipolar disorder. (Patient taking differently: Take 500 mg by mouth nightly.) 30 Tab 1    traZODone (DESYREL) 100 mg tablet Take 2 Tabs by mouth nightly. Indications: insomnia. (Patient taking differently: Take 300 mg by mouth nightly.) 30 Tab 1    ARIPiprazole (ABILIFY) 10 mg tablet Take 1 Tab by mouth daily. Indications: Bipolar illness. 15 Tab 1     Allergies   Allergen Reactions    Onion Rash     Past Medical History:   Diagnosis Date    B[facility] disorder ([facility]     GERD (gastroesophageal reflux disease)     Schizophrenia ([facility]     Suicidal behavior      No past surgical history on file. Family History   Problem Relation Age of Onset    Diabetes Mother     Hypertension Father     Prostate Cancer Father     Heart Disease Father      Social History     Tobacco Use    Smoking status: Current Every Day Smoker     Packs/day: 1.00    Smokeless tobacco: Never Used   Substance Use Topics    Alcohol use: Yes     Comment: \"drunk everyday\"       ROS      Objective: There were no vitals taken for this visit. General: alert, cooperative, no distress   Mental  status: normal mood, behavior, speech, dress, motor activity, and thought processes, able to follow commands   HENT: NCAT   Neck: no visualized mass   Resp: no respiratory distress   Neuro: no gross deficits   Skin: no discoloration or lesions of concern on visible areas   Psychiatric: normal affect, consistent with stated mood, no evidence of hallucinations     Additional exam findings: We discussed the expected course, resolution and complications of the diagnosis(es) in detail. Medication risks, benefits, costs, interactions, and alternatives were discussed as indicated. I advised him to contact the office if his condition worsens, changes or fails to improve as anticipated. He expressed understanding with the diagnosis(es) and plan.      Siva Das is a 52 y.o. male who was evaluated by a video visit encounter for concerns as above. Patient identification was verified prior to start of the visit. A caregiver was present when appropriate. Due to this being a TeleHealth encounter (During YJGOF-27 public health emergency), evaluation of the following organ systems was limited: Vitals/Constitutional/EENT/Resp/CV/GI//MS/Neuro/Skin/Heme-Lymph-Imm. Pursuant to the emergency declaration under the [address] [place] [address], [facility] waiver authority and the [employer] Act, this Virtual  Visit was conducted, with patient's (and/or legal guardian's) consent, to reduce the patient's risk of exposure to COVID-19 and provide necessary medical care. Services were provided through a video synchronous discussion virtually to substitute for in-person clinic visit. Patient and provider were located at their individual homes.       Mariah Lynn NP

## 2020-05-11 ENCOUNTER — HOSPITAL ENCOUNTER (EMERGENCY)
Age: 50
Discharge: STILL ADMITTED OR ADMINISTRATIVE | End: 2020-05-11
Attending: EMERGENCY MEDICINE

## 2020-05-11 ENCOUNTER — HOSPITAL ENCOUNTER (OUTPATIENT)
Dept: GENERAL RADIOLOGY | Age: 50
Discharge: HOME | End: 2020-05-11
Payer: MEDICAID

## 2020-05-11 DIAGNOSIS — M54.41 ACUTE BILATERAL LOW BACK PAIN WITH BILATERAL SCIATICA: ICD-10-CM

## 2020-05-11 DIAGNOSIS — M54.42 ACUTE BILATERAL LOW BACK PAIN WITH BILATERAL SCIATICA: ICD-10-CM

## 2020-05-11 PROCEDURE — 72114 X-RAY EXAM L-S SPINE BENDING: CPT

## 2020-05-15 ENCOUNTER — VIRTUAL VISIT (OUTPATIENT)
Dept: FAMILY MEDICINE CLINIC | Age: 50
End: 2020-05-15

## 2020-05-15 DIAGNOSIS — M43.10 RETROLISTHESIS: Primary | ICD-10-CM

## 2020-05-15 DIAGNOSIS — K21.9 GASTROESOPHAGEAL REFLUX DISEASE, ESOPHAGITIS PRESENCE NOT SPECIFIED: ICD-10-CM

## 2020-05-15 RX ORDER — IBUPROFEN 800 MG/1
800 TABLET ORAL
Qty: 45 TAB | Refills: 1 | Status: SHIPPED | OUTPATIENT
Start: 2020-05-15 | End: 2022-02-15 | Stop reason: SDUPTHER

## 2020-05-15 RX ORDER — OMEPRAZOLE 20 MG/1
20 CAPSULE, DELAYED RELEASE ORAL
Qty: 30 CAP | Refills: 2 | Status: SHIPPED | OUTPATIENT
Start: 2020-05-15 | End: 2021-11-04

## 2020-05-15 NOTE — PROGRESS NOTES
Antonino Rosas is a 48 y.o. male who was seen by synchronous (real-time) audio-video technology on 5/15/2020. Consent: Antonino Rosas, who was seen by synchronous (real-time) audio-video technology, and/or his healthcare decision maker, is aware that this patient-initiated, Telehealth encounter on 5/15/2020 is a billable service, with coverage as determined by his insurance carrier. He is aware that he may receive a bill and has provided verbal consent to proceed: Yes. Assessment & Plan:   Diagnoses and all orders for this visit:    1. Retrolisthesis  -     REFERRAL TO ORTHOPEDICS    2. Gastroesophageal reflux disease, esophagitis presence not specified    Other orders  -     ibuprofen (MOTRIN) 800 mg tablet; Take 1 Tab by mouth every eight (8) hours as needed for Pain (take with food, not to exceed 3 tabs in 24hrs). -     omeprazole (PRILOSEC) 20 mg capsule; Take 1 Cap by mouth Daily (before breakfast). I spent at least 16 minutes on this visit with this established patient. [phone]  Subjective:   Antonino Rosas is a 48 y.o. male who was seen for Results (xray results) and Back Pain    Patient reports back pain is stable. Has been taking prescribed ibuprofen for pain with improvement. New concerns: states he has been having worsening of reflux symptoms. Comments he hs been experiencing water brash. Has taken prilosec in the past with improvement. XR Results (most recent):  Results from Hospital Encounter encounter on 05/11/20   XR SPINE LUMB COMP W BEND    Narrative HISTORY: Lumbar spine pain. Instability was suspected. Exam: Lumbar spine with flexion and extension. Technique: 7 views lumbar spine were obtained. Compared to 3/31/2009. FINDINGS: Five lumbar vertebra's are visualized with grade 1 retrolisthesis of  L5 over S1 is noted. Minimal anterolisthesis is demonstrated on flexion imaging.   Disc as well as facet hypertrophic arthropathy degenerative changes are noted in  the lower lumber spine. No vertebral body height loss or intervertebral space  narrowing is demonstrated. Visualized soft tissues are unremarkable. Impression IMPRESSION:  1. No acute compression deformity. Grade 1 retrolisthesis of L5 over S1 with  minimal anterolisthesis on flexion imaging. Prior to Admission medications    Medication Sig Start Date End Date Taking? Authorizing Provider   ibuprofen (MOTRIN) 800 mg tablet Take 1 Tab by mouth every eight (8) hours as needed for Pain (take with food, not to exceed 3 tabs in 24hrs). 5/1/20   Kurt Camel T, NP   tiZANidine (ZANAFLEX) 4 mg tablet Take 1 Tab by mouth three (3) times daily as needed for Pain (not to exceed 3 tabs in 24hrs). 4/6/20   Kurt Camel T, NP   divalproex ER (DEPAKOTE ER) 500 mg ER tablet Take 2 Tabs by mouth nightly. Indications: Bipolar disorder. Patient taking differently: Take 500 mg by mouth nightly. 10/9/18   Fortino Rodríguez MD   traZODone (DESYREL) 100 mg tablet Take 2 Tabs by mouth nightly. Indications: insomnia. Patient taking differently: Take 300 mg by mouth nightly. 10/9/18   Fortino Rodríguez MD   ARIPiprazole (ABILIFY) 10 mg tablet Take 1 Tab by mouth daily. Indications: Bipolar illness.  10/10/18   Fortino Rodríguez MD     Allergies   Allergen Reactions    Onion Rash       Patient Active Problem List   Diagnosis Code    Debility R53.81    Bipolar 1 disorder ([facility] F31.9    SOB (shortness of breath) on exertion R06.02    Bronchitis due to tobacco use J40, Z72.0    Abnormal CT scan of lung R91.8    Left inguinal hernia K40.90    Gynecomastia, male N58    Depression F32.9    Cocaine abuse with cocaine-induced mood disorder ([address] F14.14     Patient Active Problem List    Diagnosis Date Noted    Cocaine abuse with cocaine-induced mood disorder ([facility] 10/05/2018     Priority: 2 - Two    Depression 10/04/2018    Gynecomastia, male 05/23/2018    Left inguinal hernia 01/12/2018    SOB (shortness of breath) on exertion 11/01/2017    Bronchitis due to tobacco use 11/01/2017    Abnormal CT scan of lung 11/01/2017    Bipolar 1 disorder ([facility] 12/26/2016    Debility 12/25/2016     Allergies   Allergen Reactions    Onion Rash     Past Medical History:   Diagnosis Date    Bipolar disorder ([facility]     GERD (gastroesophageal reflux disease)     Schizophrenia ([facility]     Suicidal behavior      No past surgical history on file. Family History   Problem Relation Age of Onset    Diabetes Mother     Hypertension Father     Prostate Cancer Father     Heart Disease Father      Social History     Tobacco Use    Smoking status: Current Every Day Smoker     Packs/day: 1.00    Smokeless tobacco: Never Used   Substance Use Topics    Alcohol use: Yes     Comment: \"drunk everyday\"       Review of Systems   Gastrointestinal: Positive for heartburn. Negative for abdominal pain and nausea. Objective: There were no vitals taken for this visit. General: alert, cooperative, no distress   Mental  status: normal mood, behavior, speech, dress, motor activity, and thought processes, able to follow commands   HENT: NCAT   Neck: no visualized mass   Resp: no respiratory distress   Neuro: no gross deficits   Skin: no discoloration or lesions of concern on visible areas   Psychiatric: normal affect, consistent with stated mood, no evidence of hallucinations     Additional exam findings: We discussed the expected course, resolution and complications of the diagnosis(es) in detail. Medication risks, benefits, costs, interactions, and alternatives were discussed as indicated. I advised him to contact the office if his condition worsens, changes or fails to improve as anticipated. He expressed understanding with the diagnosis(es) and plan. Everton Maloney is a 48 y.o. male who was evaluated by a video visit encounter for concerns as above. Patient identification was verified prior to start of the visit.  MICHAEL caregiver was present when appropriate. Due to this being a TeleHealth encounter (During SYBCL-30 public health emergency), evaluation of the following organ systems was limited: Vitals/Constitutional/EENT/Resp/CV/GI//MS/Neuro/Skin/Heme-Lymph-Imm. Pursuant to the emergency declaration under the [address] [place] [address], [facility] waiver authority and the [employer] and [employer] Act, this Virtual  Visit was conducted, with patient's (and/or legal guardian's) consent, to reduce the patient's risk of exposure to COVID-19 and provide necessary medical care. Services were provided through a video synchronous discussion virtually to substitute for in-person clinic visit. Patient and provider were located at their individual homes.       Aundrea Mendez NP

## 2020-11-02 ENCOUNTER — HOSPITAL ENCOUNTER (INPATIENT)
Age: 50
LOS: 6 days | Discharge: HOME | DRG: 750 | End: 2020-11-09
Attending: EMERGENCY MEDICINE | Admitting: PSYCHIATRY & NEUROLOGY
Payer: MEDICAID

## 2020-11-02 ENCOUNTER — VIRTUAL VISIT (OUTPATIENT)
Dept: FAMILY MEDICINE CLINIC | Age: 50
End: 2020-11-02

## 2020-11-02 DIAGNOSIS — F23: ICD-10-CM

## 2020-11-02 DIAGNOSIS — R45.851 SUICIDAL IDEATION: Primary | ICD-10-CM

## 2020-11-02 PROCEDURE — 99283 EMERGENCY DEPT VISIT LOW MDM: CPT

## 2020-11-02 NOTE — PROGRESS NOTES
Salvatore Hernandez is a 48 y.o. male who was seen by synchronous (real-time) audio-video technology on 11/2/2020 for Ear Pain    Assessment & Plan:   Diagnoses and all orders for this visit:    1. ERRONEOUS ENCOUNTER--DISREGARD      Patient advised to go to local urgent care for further evaluation of ear pain. [phone]  Subjective:   Patient states he has had bilateral ear pain for the last 4 days, R>L. Denies sinus pressure/pain. Denies nasal congestion. Further denies sore throat. Prior to Admission medications    Medication Sig Start Date End Date Taking? Authorizing Provider   ibuprofen (MOTRIN) 800 mg tablet Take 1 Tab by mouth every eight (8) hours as needed for Pain (take with food, not to exceed 3 tabs in 24hrs). 5/15/20   Sam Pastora T, NP   omeprazole (PRILOSEC) 20 mg capsule Take 1 Cap by mouth Daily (before breakfast). 5/15/20   Sam Pastora T, NP   tiZANidine (ZANAFLEX) 4 mg tablet Take 1 Tab by mouth three (3) times daily as needed for Pain (not to exceed 3 tabs in 24hrs). 4/6/20   Sam Pastora ARNOLD, NP   divalproex ER (DEPAKOTE ER) 500 mg ER tablet Take 2 Tabs by mouth nightly. Indications: Bipolar disorder. Patient taking differently: Take 500 mg by mouth nightly. 10/9/18   James Talavera MD   traZODone (DESYREL) 100 mg tablet Take 2 Tabs by mouth nightly. Indications: insomnia. Patient taking differently: Take 300 mg by mouth nightly. 10/9/18   James Talavera MD   ARIPiprazole (ABILIFY) 10 mg tablet Take 1 Tab by mouth daily. Indications: Bipolar illness.  10/10/18   James Talavera MD     Patient Active Problem List   Diagnosis Code    Debility R53.81    Bipolar 1 disorder ([facility] F31.9    SOB (shortness of breath) on exertion R06.02    Bronchitis due to tobacco use J40, Z72.0    Abnormal CT scan of lung R91.8    Left inguinal hernia K40.90    Gynecomastia, male N58    Depression F32.9    Cocaine abuse with cocaine-induced mood disorder ([facility] F14.14     Patient Active Problem List    Diagnosis Date Noted    Cocaine abuse with cocaine-induced mood disorder ([facility] [address] 10/05/2018     Priority: 2 - Two    Depression 10/04/2018    Gynecomastia, male 05/23/2018    Left inguinal hernia 01/12/2018    SOB (shortness of breath) on exertion 11/01/2017    Bronchitis due to tobacco use 11/01/2017    Abnormal CT scan of lung 11/01/2017    Bipolar 1 disorder ([facility] 12/26/2016    Debility 12/25/2016     Current Outpatient Medications   Medication Sig Dispense Refill    ibuprofen (MOTRIN) 800 mg tablet Take 1 Tab by mouth every eight (8) hours as needed for Pain (take with food, not to exceed 3 tabs in 24hrs). 45 Tab 1    omeprazole (PRILOSEC) 20 mg capsule Take 1 Cap by mouth Daily (before breakfast). 30 Cap 2    tiZANidine (ZANAFLEX) 4 mg tablet Take 1 Tab by mouth three (3) times daily as needed for Pain (not to exceed 3 tabs in 24hrs). 30 Tab 0    divalproex ER (DEPAKOTE ER) 500 mg ER tablet Take 2 Tabs by mouth nightly. Indications: Bipolar disorder. (Patient taking differently: Take 500 mg by mouth nightly.) 30 Tab 1    traZODone (DESYREL) 100 mg tablet Take 2 Tabs by mouth nightly. Indications: insomnia. (Patient taking differently: Take 300 mg by mouth nightly.) 30 Tab 1    ARIPiprazole (ABILIFY) 10 mg tablet Take 1 Tab by mouth daily. Indications: Bipolar illness. 15 Tab 1     Allergies   Allergen Reactions    Onion Rash     Past Medical History:   Diagnosis Date    [facility]     GERD (gastroesophageal reflux disease)     Schizophrenia ([facility]     Suicidal behavior      No past surgical history on file.   Family History   Problem Relation Age of Onset    Diabetes Mother     Hypertension Father     Prostate Cancer Father     Heart Disease Father      Social History     Tobacco Use    Smoking status: Current Every Day Smoker     Packs/day: 1.00    Smokeless tobacco: Never Used   Substance Use Topics    Alcohol use: Yes     Comment: \"drunk everyday\" ROS    Objective:   No flowsheet data found. General: alert, cooperative, no distress   Mental  status: normal mood, behavior, speech, dress, motor activity, and thought processes, able to follow commands   HENT: NCAT   Neck: no visualized mass   Resp: no respiratory distress   Neuro: no gross deficits   Skin: no discoloration or lesions of concern on visible areas   Psychiatric: normal affect, consistent with stated mood, no evidence of hallucinations     Additional exam findings: We discussed the expected course, resolution and complications of the diagnosis(es) in detail. Medication risks, benefits, costs, interactions, and alternatives were discussed as indicated. I advised him to contact the office if his condition worsens, changes or fails to improve as anticipated. He expressed understanding with the diagnosis(es) and plan. Siva Moon, who was evaluated through a patient-initiated, synchronous (real-time) audio-video encounter, and/or his healthcare decision maker, is aware that it is a billable service, with coverage as determined by his insurance carrier. He provided verbal consent to proceed: Yes, and patient identification was verified. It was conducted pursuant to the emergency declaration under the [facility], [address] [place] [address] authority and the [employer] and [employer] General Act. A caregiver was present when appropriate. Ability to conduct physical exam was limited. I was in the office. The patient was at home.       Nabil Quintana NP

## 2020-11-03 LAB
ALBUMIN SERPL-MCNC: 4 G/DL (ref 3.4–5)
ALBUMIN/GLOB SERPL: 1.1 {RATIO} (ref 0.8–1.7)
ALP SERPL-CCNC: 87 U/L (ref 45–117)
ALT SERPL-CCNC: 23 U/L (ref 16–61)
AMPHET UR QL SCN: NEGATIVE
ANION GAP SERPL CALC-SCNC: 5 MMOL/L (ref 3–18)
APPEARANCE UR: CLEAR
AST SERPL-CCNC: 13 U/L (ref 10–38)
BARBITURATES UR QL SCN: NEGATIVE
BASOPHILS # BLD: 0 K/UL (ref 0–0.1)
BASOPHILS NFR BLD: 0 % (ref 0–2)
BENZODIAZ UR QL: NEGATIVE
BILIRUB SERPL-MCNC: 0.5 MG/DL (ref 0.2–1)
BILIRUB UR QL: NEGATIVE
BUN SERPL-MCNC: 12 MG/DL (ref 7–18)
BUN/CREAT SERPL: 9 (ref 12–20)
CALCIUM SERPL-MCNC: 9 MG/DL (ref 8.5–10.1)
CANNABINOIDS UR QL SCN: POSITIVE
CHLORIDE SERPL-SCNC: 111 MMOL/L (ref 100–111)
CO2 SERPL-SCNC: 27 MMOL/L (ref 21–32)
COCAINE UR QL SCN: POSITIVE
COLOR UR: YELLOW
COVID-19 RAPID TEST, COVR: NOT DETECTED
CREAT SERPL-MCNC: 1.35 MG/DL (ref 0.6–1.3)
DIFFERENTIAL METHOD BLD: NORMAL
EOSINOPHIL # BLD: 0.2 K/UL (ref 0–0.4)
EOSINOPHIL NFR BLD: 3 % (ref 0–5)
ERYTHROCYTE [DISTWIDTH] IN BLOOD BY AUTOMATED COUNT: 14.4 % (ref 11.6–14.5)
ETHANOL SERPL-MCNC: 26 MG/DL (ref 0–3)
GLOBULIN SER CALC-MCNC: 3.5 G/DL (ref 2–4)
GLUCOSE SERPL-MCNC: 83 MG/DL (ref 74–99)
GLUCOSE UR STRIP.AUTO-MCNC: NEGATIVE MG/DL
HCT VFR BLD AUTO: 39 % (ref 36–48)
HDSCOM,HDSCOM: ABNORMAL
HGB BLD-MCNC: 13.9 G/DL (ref 13–16)
HGB UR QL STRIP: NEGATIVE
KETONES UR QL STRIP.AUTO: NEGATIVE MG/DL
LEUKOCYTE ESTERASE UR QL STRIP.AUTO: NEGATIVE
LYMPHOCYTES # BLD: 2.4 K/UL (ref 0.9–3.6)
LYMPHOCYTES NFR BLD: 28 % (ref 21–52)
MCH RBC QN AUTO: 29.6 PG (ref 24–34)
MCHC RBC AUTO-ENTMCNC: 35.6 G/DL (ref 31–37)
MCV RBC AUTO: 83 FL (ref 74–97)
METHADONE UR QL: NEGATIVE
MONOCYTES # BLD: 0.4 K/UL (ref 0.05–1.2)
MONOCYTES NFR BLD: 5 % (ref 3–10)
NEUTS SEG # BLD: 5.6 K/UL (ref 1.8–8)
NEUTS SEG NFR BLD: 64 % (ref 40–73)
NITRITE UR QL STRIP.AUTO: NEGATIVE
OPIATES UR QL: NEGATIVE
PCP UR QL: NEGATIVE
PH UR STRIP: 5 [PH] (ref 5–8)
PLATELET # BLD AUTO: 217 K/UL (ref 135–420)
PMV BLD AUTO: 10.6 FL (ref 9.2–11.8)
POTASSIUM SERPL-SCNC: 3.5 MMOL/L (ref 3.5–5.5)
PROT SERPL-MCNC: 7.5 G/DL (ref 6.4–8.2)
PROT UR STRIP-MCNC: NEGATIVE MG/DL
RBC # BLD AUTO: 4.7 M/UL (ref 4.7–5.5)
SODIUM SERPL-SCNC: 143 MMOL/L (ref 136–145)
SOURCE, COVRS: NORMAL
SP GR UR REFRACTOMETRY: 1.01 (ref 1–1.03)
SPECIMEN TYPE, XMCV1T: NORMAL
UROBILINOGEN UR QL STRIP.AUTO: 1 EU/DL (ref 0.2–1)
WBC # BLD AUTO: 8.7 K/UL (ref 4.6–13.2)

## 2020-11-03 PROCEDURE — 80053 COMPREHEN METABOLIC PANEL: CPT

## 2020-11-03 PROCEDURE — 81003 URINALYSIS AUTO W/O SCOPE: CPT

## 2020-11-03 PROCEDURE — 85025 COMPLETE CBC W/AUTO DIFF WBC: CPT

## 2020-11-03 PROCEDURE — 74011250637 HC RX REV CODE- 250/637: Performed by: PSYCHIATRY & NEUROLOGY

## 2020-11-03 PROCEDURE — 65220000003 HC RM SEMIPRIVATE PSYCH

## 2020-11-03 PROCEDURE — 80307 DRUG TEST PRSMV CHEM ANLYZR: CPT

## 2020-11-03 PROCEDURE — 87635 SARS-COV-2 COVID-19 AMP PRB: CPT

## 2020-11-03 RX ORDER — TRAZODONE HYDROCHLORIDE 100 MG/1
100 TABLET ORAL
Status: DISCONTINUED | OUTPATIENT
Start: 2020-11-03 | End: 2020-11-06

## 2020-11-03 RX ORDER — ARIPIPRAZOLE 10 MG/1
20 TABLET ORAL DAILY
Status: DISCONTINUED | OUTPATIENT
Start: 2020-11-04 | End: 2020-11-09 | Stop reason: HOSPADM

## 2020-11-03 RX ORDER — DIVALPROEX SODIUM 250 MG/1
250 TABLET, EXTENDED RELEASE ORAL DAILY
Status: DISCONTINUED | OUTPATIENT
Start: 2020-11-04 | End: 2020-11-05

## 2020-11-03 RX ORDER — HYDROXYZINE PAMOATE 50 MG/1
50 CAPSULE ORAL
Status: DISCONTINUED | OUTPATIENT
Start: 2020-11-03 | End: 2020-11-09 | Stop reason: HOSPADM

## 2020-11-03 RX ORDER — DIVALPROEX SODIUM 250 MG/1
500 TABLET, DELAYED RELEASE ORAL EVERY EVENING
Status: DISCONTINUED | OUTPATIENT
Start: 2020-11-03 | End: 2020-11-05

## 2020-11-03 RX ORDER — BENZTROPINE MESYLATE 1 MG/1
0.5 TABLET ORAL 2 TIMES DAILY
Status: DISCONTINUED | OUTPATIENT
Start: 2020-11-03 | End: 2020-11-09 | Stop reason: HOSPADM

## 2020-11-03 RX ADMIN — BENZTROPINE MESYLATE 0.5 MG: 1 TABLET ORAL at 20:34

## 2020-11-03 RX ADMIN — TRAZODONE HYDROCHLORIDE 100 MG: 100 TABLET ORAL at 20:34

## 2020-11-03 NOTE — GROUP NOTE
IP  GROUP DOCUMENTATION INDIVIDUAL Group Therapy Note Date: 11/3/2020 Group Start Time: 1000 Group End Time: 9159 Group Topic: Nursing [facility] 1 ADULT CHEM DEP Shoaib Estimable IP  GROUP DOCUMENTATION GROUP Group Therapy Note Attendees: 6 Attendance: Did not attend Ezra Cote

## 2020-11-03 NOTE — ED TRIAGE NOTES
C/o SI all day. \"I want to go to [facility] but I dont have a way to get there. \"  Denies HI.   Reports auditory hallucinations telling him to kill himself

## 2020-11-03 NOTE — BH NOTES
48year old -American male admitted to unit for SI with a plan to hang himself as well as A/V/H. Pt states, \"I see dead people and bugs, and hear voices. \" Pt states, \"I feel more depressed lately. \" Past medical and psychiatric history to include Bipolar and schizophrenia. Pt states that he does not have an outpatient therapist, but [facility] [employer] as his [facility]. Urine drug screen positive for ETOH, cocaine, and THC. [address]. Pt states that he smokes 1 pack of cigarettes per week, and uses $150 dollars worth of cocaine every month. Pt also states that he drinks a six-pack per week. Upon arrival to the unit, pt is calm and cooperative, compliant with admission process. Pt was checked for contraband and none was found. All personal belongings were secured. Pt was oriented to unit and encouraged to seek staff for support as needed. Will continue to monitor. RNs will initiate, develop, implement, review or revise treatment plan.
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
## 2020-11-03 NOTE — H&P
History and Physical        Patient: Richard Shi               Sex: male          DOA: 11/2/2020         YOB: 1970      Age:  48 y.o.        LOS:  LOS: 0 days        HPI:     Richard Shi is a 48 y.o. AA male who was admitted experiencing depression,     suicidal ideation,auditory and visual hallucinations tell him to kill himself and he was also  Seeing dead people. Active Problems:    Bipolar 1 disorder ([facility] (12/26/2016)      Overview: Dr. Celeste Haskins        Past Medical History:   Diagnosis Date    Bipolar disorder ([facility]     GERD (gastroesophageal reflux disease)     Schizophrenia ([facility]     Suicidal behavior        History reviewed. No pertinent surgical history. Family History   Problem Relation Age of Onset    Diabetes Mother     Hypertension Father     Prostate Cancer Father     Heart Disease Father        Social History     Socioeconomic History    Marital status: UNKNOWN     Spouse name: Not on file    Number of children: Not on file    Years of education: Not on file    Highest education level: Not on file   Tobacco Use    Smoking status: Current Every Day Smoker     Packs/day: 1.00    Smokeless tobacco: Never Used   Substance and Sexual Activity    Alcohol use: Yes     Comment: \"drunk everyday\"    Drug use: Yes     Types: Cocaine     Comment: smokes crack       Prior to Admission medications    Medication Sig Start Date End Date Taking? Authorizing Provider   ibuprofen (MOTRIN) 800 mg tablet Take 1 Tab by mouth every eight (8) hours as needed for Pain (take with food, not to exceed 3 tabs in 24hrs). 5/15/20   Nikki ARNOLD NP   omeprazole (PRILOSEC) 20 mg capsule Take 1 Cap by mouth Daily (before breakfast). 5/15/20   Nikki ARNOLD NP   tiZANidine (ZANAFLEX) 4 mg tablet Take 1 Tab by mouth three (3) times daily as needed for Pain (not to exceed 3 tabs in 24hrs).  4/6/20   Nikki ARNOLD NP   divalproex ER (DEPAKOTE ER) 500 mg ER tablet Take 2 Tabs by mouth nightly. Indications: Bipolar disorder. Patient taking differently: Take 500 mg by mouth nightly. 10/9/18   Ronnie Ruano MD   traZODone (DESYREL) 100 mg tablet Take 2 Tabs by mouth nightly. Indications: insomnia. Patient taking differently: Take 300 mg by mouth nightly. 10/9/18   Ronnie Ruano MD   ARIPiprazole (ABILIFY) 10 mg tablet Take 1 Tab by mouth daily. Indications: Bipolar illness. 10/10/18   Ronnie Ruano MD       Allergies   Allergen Reactions    Onion Rash       Review of Systems  A comprehensive review of systems was negative except for that written in the History of Present Illness. Physical Exam:      Visit Vitals  /86   Pulse (!) 58   Temp 98.2 °F (36.8 °C)   Resp 16   Wt 111.1 kg (245 lb)   SpO2 99%   BMI 33.23 kg/m²       Physical Exam:  Physical Exam:   General:  Alert, cooperative, pleasant, no distress, appears stated age. Eyes:  Conjunctivae/corneas clear. PERRL, EOMs intact. Fundi benign   Ears:  Normal TMs and external ear canals both ears. Nose: Nares normal. Septum midline. Mucosa normal. No drainage or sinus tenderness. Mouth/Throat: Lips, mucosa, and tongue normal. Teeth and gums normal.   Neck: Supple, symmetrical, trachea midline, no adenopathy, thyroid: no enlargement/tenderness/nodules, no carotid bruit and no JVD. Back:   Symmetric, no curvature. ROM normal. No CVA tenderness. Lungs:   Clear to auscultation bilaterally. Heart:  Regular rate and rhythm, S1, S2 normal, no murmur, click, rub or gallop. Abdomen:   Soft, non-tender. Bowel sounds normal. No masses,  No organomegaly. Extremities: Extremities normal, atraumatic, no cyanosis or edema. Pulses: 2+ and symmetric all extremities. Skin: Skin color, texture, turgor normal. No rashes or lesions   Lymph nodes: Cervical, supraclavicular, and axillary nodes normal.   Neurologic: CNII-XII intact. Normal strength, sensation and reflexes throughout.            Assessment/Plan Patient was cooperative and appropriate. Plan is for patient to participate in all unit   activities, take medications as prescribed and follow all discharge orders.

## 2020-11-03 NOTE — ED PROVIDER NOTES
EMERGENCY DEPARTMENT HISTORY AND PHYSICAL EXAM      Date: 11/2/2020  Patient Name: Lizzette Hill    History of Presenting Illness     Chief Complaint   Patient presents with    Suicidal       History (Context): Lizzette Hill is a 48 y.o. gentleman presents with acute onset, severe, focal suicidal ideation without exacerbating/relieving features or other associated symptoms. The patient does have a plan. He plans to hang himself. The patient does not have access to guns. The patient Saroj Moreno a history of suicide attempts. On review of systems, the patient denies fever, chills, rashes, hallucinations, homicidal ideation, staying up all night, drug use, recent changes to meds. PCP: Remberto Gonzalez NP    Current Outpatient Medications   Medication Sig Dispense Refill    divalproex ER (DEPAKOTE ER) 500 mg ER tablet Take 2 Tabs by mouth nightly. Indications: Bipolar disorder. (Patient taking differently: Take 500 mg by mouth nightly.) 30 Tab 1    ARIPiprazole (ABILIFY) 10 mg tablet Take 1 Tab by mouth daily. Indications: Bipolar illness. 15 Tab 1    ibuprofen (MOTRIN) 800 mg tablet Take 1 Tab by mouth every eight (8) hours as needed for Pain (take with food, not to exceed 3 tabs in 24hrs). 45 Tab 1    omeprazole (PRILOSEC) 20 mg capsule Take 1 Cap by mouth Daily (before breakfast). 30 Cap 2    tiZANidine (ZANAFLEX) 4 mg tablet Take 1 Tab by mouth three (3) times daily as needed for Pain (not to exceed 3 tabs in 24hrs). 30 Tab 0    traZODone (DESYREL) 100 mg tablet Take 2 Tabs by mouth nightly. Indications: insomnia. (Patient taking differently: Take 300 mg by mouth nightly.) 30 Tab 1       Past History     Past Medical History:  Past Medical History:   Diagnosis Date    Bipolar disorder ([address]     GERD (gastroesophageal reflux disease)     Schizophrenia ([facility]     Suicidal behavior        Past Surgical History:  History reviewed. No pertinent surgical history.     Family History:  Family History Problem Relation Age of Onset    Diabetes Mother     Hypertension Father     Prostate Cancer Father     Heart Disease Father        Social History:  Social History     Tobacco Use    Smoking status: Current Every Day Smoker     Packs/day: 1.00    Smokeless tobacco: Never Used   Substance Use Topics    Alcohol use: Yes     Comment: \"drunk everyday\"    Drug use: Yes     Types: Cocaine     Comment: smokes crack       Allergies: Allergies   Allergen Reactions    Onion Rash       PMH, PSH, family history, social history, allergies reviewed with the patient with significant items noted above. Review of Systems   As per HPI, otherwise reviewed and negative. Physical Exam     Vitals:    11/02/20 2318   BP: (!) 146/81   Pulse: 85   Resp: 16   Temp: 98 °F (36.7 °C)   SpO2: 98%   Weight: 111.1 kg (245 lb)       Gen: Well-appearing, in no acute distress  HEENT: Normocephalic, sclera anicteric  Cardiovascular: Normal rate, regular rhythm, no murmurs, rubs, gallops. Pulses intact and equal distally. Pulmonary: No respiratory distress. No stridor. Clear lungs. ABD: Soft, nontender, nondistended. Neuro: Alert. Oriented. Normal speech. Normal mentation. Psych: Appearance: Normal, Speech: Normal, Thought content: Suicidal ideation, hearing voices, Thought process: Normal, Mood: \"Bad\", Affect: Somewhat incongruent  : No CVA tenderness  EXT: Moves all extremities well. No cyanosis or clubbing. Skin: Warm and well-perfused.       Diagnostic Study Results     Labs -     Recent Results (from the past 12 hour(s))   CBC WITH AUTOMATED DIFF    Collection Time: 11/03/20  2:53 AM   Result Value Ref Range    WBC 8.7 4.6 - 13.2 K/uL    RBC 4.70 4.70 - 5.50 M/uL    HGB 13.9 13.0 - 16.0 g/dL    HCT 39.0 36.0 - 48.0 %    MCV 83.0 74.0 - 97.0 FL    MCH 29.6 24.0 - 34.0 PG    MCHC 35.6 31.0 - 37.0 g/dL    RDW 14.4 11.6 - 14.5 %    PLATELET 275 253 - 710 K/uL    MPV 10.6 9.2 - 11.8 FL    NEUTROPHILS 64 40 - 73 % LYMPHOCYTES 28 21 - 52 %    MONOCYTES 5 3 - 10 %    EOSINOPHILS 3 0 - 5 %    BASOPHILS 0 0 - 2 %    ABS. NEUTROPHILS 5.6 1.8 - 8.0 K/UL    ABS. LYMPHOCYTES 2.4 0.9 - 3.6 K/UL    ABS. MONOCYTES 0.4 0.05 - 1.2 K/UL    ABS. EOSINOPHILS 0.2 0.0 - 0.4 K/UL    ABS. BASOPHILS 0.0 0.0 - 0.1 K/UL    DF AUTOMATED     METABOLIC PANEL, COMPREHENSIVE    Collection Time: 11/03/20  2:53 AM   Result Value Ref Range    Sodium 143 136 - 145 mmol/L    Potassium 3.5 3.5 - 5.5 mmol/L    Chloride 111 100 - 111 mmol/L    CO2 27 21 - 32 mmol/L    Anion gap 5 3.0 - 18 mmol/L    Glucose 83 74 - 99 mg/dL    BUN 12 7.0 - 18 MG/DL    Creatinine 1.35 (H) 0.6 - 1.3 MG/DL    BUN/Creatinine ratio 9 (L) 12 - 20      GFR est AA >60 >60 ml/min/1.73m2    GFR est non-AA 56 (L) >60 ml/min/1.73m2    Calcium 9.0 8.5 - 10.1 MG/DL    Bilirubin, total 0.5 0.2 - 1.0 MG/DL    ALT (SGPT) 23 16 - 61 U/L    AST (SGOT) 13 10 - 38 U/L    Alk.  phosphatase 87 45 - 117 U/L    Protein, total 7.5 6.4 - 8.2 g/dL    Albumin 4.0 3.4 - 5.0 g/dL    Globulin 3.5 2.0 - 4.0 g/dL    A-G Ratio 1.1 0.8 - 1.7     ETHYL ALCOHOL    Collection Time: 11/03/20  2:53 AM   Result Value Ref Range    ALCOHOL(ETHYL),SERUM 26 (H) 0 - 3 MG/DL   URINALYSIS W/ RFLX MICROSCOPIC    Collection Time: 11/03/20  2:53 AM   Result Value Ref Range    Color YELLOW      Appearance CLEAR      Specific gravity 1.015 1.005 - 1.030      pH (UA) 5.0 5.0 - 8.0      Protein Negative NEG mg/dL    Glucose Negative NEG mg/dL    Ketone Negative NEG mg/dL    Bilirubin Negative NEG      Blood Negative NEG      Urobilinogen 1.0 0.2 - 1.0 EU/dL    Nitrites Negative NEG      Leukocyte Esterase Negative NEG     DRUG SCREEN, URINE    Collection Time: 11/03/20  2:53 AM   Result Value Ref Range    BENZODIAZEPINES Negative NEG      BARBITURATES Negative NEG      THC (TH-CANNABINOL) Positive (A) NEG      OPIATES Negative NEG      PCP(PHENCYCLIDINE) Negative NEG      COCAINE Positive (A) NEG      AMPHETAMINES Negative NEG METHADONE Negative NEG      HDSCOM (NOTE)    SARS-COV-2    Collection Time: 11/03/20  4:00 AM   Result Value Ref Range    Specimen source Nasopharyngeal      COVID-19 rapid test Not detected NOTD      Specimen type NP Swab         Radiologic Studies -   No orders to display     CT Results  (Last 48 hours)    None        CXR Results  (Last 48 hours)    None            Medical Decision Making   I am the first provider for this patient. I reviewed the vital signs, available nursing notes, past medical history, past surgical history, family history and social history. Vital Signs-Reviewed the patient's vital signs. Records Reviewed: Personally, on initial evaluation    MDM:   Patient presents with suicidal ideation. The patient does not have other medical complaints. Exam significant for normal appearance, normal speech, normal thought processes, mood bad, affect incongruent. Patient is moderate risk for suicide. DDX considered: Suicidal ideation, psychosis, malingering, secondary gain, severe depression  DDX thought to be less likely but also considered due to high risk condition: Homicidal ideation    Plan:   Physician hold  Medical clearance with basic labs  Psychiatric consultation    Orders as below:  Orders Placed This Encounter    CBC WITH AUTOMATED DIFF    COMPREHENSIVE METABOLIC PANEL    ETHYL ALCOHOL    URINALYSIS W/ RFLX MICROSCOPIC    Urine Drug Screen    SARS-COV-2    DIET REGULAR        ED Course:   Patient medically clear. Patient seen and evaluated by crisis, and the patient will be referred for admission. Patient is admitted. Critical Care Time:  The services I provided to this patient were to treat and/or prevent clinically significant deterioration that could result in the failure of one or more body systems and/or organ systems due to acute psychiatric emergency.     Services included the following:  -reviewing nursing notes and old charts  -vital sign assessments  -direct patient care  -medication orders and management  -interpreting and reviewing diagnostic studies/labs  -re-evaluations  -documentation time    Aggregate critical care time was 40 minutes, which includes only time during which I was engaged in work directly related to the patient's care as described above, whether I was at bedside or elsewhere in the Emergency Department. It did not include time spent performing other reported procedures or the services of residents, students, nurses, or advance practice providers. Alaina Santo MD    6:26 AM        Diagnosis     Clinical Impression:   1. Suicidal ideation    2. Acute schizophrenia episode ([facility]        Signed,  Pat Pryor MD  Emergency Physician  [facility]    As a voice dictation software was utilized to dictate this note, minor word transpositions can occur. I apologize for confusing wording and typographic errors. Please feel free to contact me for clarification.

## 2020-11-03 NOTE — ED NOTES
Non emergency number called to ppd dispatch, made dispatcher aware that pt eloped and is suicidal.  Charge RN miranda aware.

## 2020-11-04 PROBLEM — F14.20 COCAINE USE DISORDER, SEVERE, DEPENDENCE (HCC): Chronic | Status: ACTIVE | Noted: 2020-11-04

## 2020-11-04 PROBLEM — F17.200 NICOTINE USE DISORDER: Chronic | Status: ACTIVE | Noted: 2020-11-04

## 2020-11-04 PROBLEM — F32.A DEPRESSION: Status: RESOLVED | Noted: 2018-10-04 | Resolved: 2020-11-04

## 2020-11-04 PROBLEM — F14.14 COCAINE ABUSE WITH COCAINE-INDUCED MOOD DISORDER (HCC): Chronic | Status: RESOLVED | Noted: 2018-10-05 | Resolved: 2020-11-04

## 2020-11-04 PROBLEM — M54.2 CERVICAL PAIN (NECK): Chronic | Status: ACTIVE | Noted: 2020-11-04

## 2020-11-04 PROCEDURE — 65220000003 HC RM SEMIPRIVATE PSYCH

## 2020-11-04 PROCEDURE — 99222 1ST HOSP IP/OBS MODERATE 55: CPT | Performed by: PSYCHIATRY & NEUROLOGY

## 2020-11-04 PROCEDURE — 74011250637 HC RX REV CODE- 250/637: Performed by: PSYCHIATRY & NEUROLOGY

## 2020-11-04 RX ORDER — ACETAMINOPHEN 325 MG/1
650 TABLET ORAL
Status: DISCONTINUED | OUTPATIENT
Start: 2020-11-04 | End: 2020-11-09 | Stop reason: HOSPADM

## 2020-11-04 RX ORDER — ACETAMINOPHEN 500 MG
2 TABLET ORAL
Status: DISCONTINUED | OUTPATIENT
Start: 2020-11-04 | End: 2020-11-09 | Stop reason: HOSPADM

## 2020-11-04 RX ORDER — HALOPERIDOL 5 MG/1
5 TABLET ORAL
Status: DISCONTINUED | OUTPATIENT
Start: 2020-11-04 | End: 2020-11-09 | Stop reason: HOSPADM

## 2020-11-04 RX ADMIN — DIVALPROEX SODIUM 500 MG: 250 TABLET, DELAYED RELEASE ORAL at 17:34

## 2020-11-04 RX ADMIN — TRAZODONE HYDROCHLORIDE 100 MG: 100 TABLET ORAL at 20:14

## 2020-11-04 RX ADMIN — BENZTROPINE MESYLATE 0.5 MG: 1 TABLET ORAL at 20:15

## 2020-11-04 NOTE — GROUP NOTE
DOTTIE  GROUP DOCUMENTATION INDIVIDUAL Group Therapy Note Date: 11/3/2020 Group Start Time: 2000 Group End Time: 2030 Group Topic: Medication [address] ADULT CHEM DEP Dasha Carlin [facility] GROUP DOCUMENTATION GROUP Group Therapy Note Attendees: 10 Attendance: Attended Interventions/techniques: Informed Follows Directions: Followed directions Interactions: Interacted appropriately Mental Status: Calm Behavior/appearance: Attentive Goals Achieved: Able to receive feedback Aristides Robles

## 2020-11-04 NOTE — BH NOTES
During this period (3pm-11pm) pt has been isolative in his room resting this period. Pt was newly admitted to the unit today in the afternoon. After completing his admission process pt stated, \"I just want to get some rest.\" Pt stated he did not have an appetite when evening meals arrived on the unit. Pt remains in bed resting at this time. Staff will continue rounds monitoring pt for changes in behavior, location & safety.

## 2020-11-04 NOTE — BSMART NOTE
[facility] [facility] Biopsychosocial Assessment Current Level of Psychosocial Functioning  
 
[x]Independent 
[]Dependent []Minimal Assist 
 
 
Comments:   
 
Psychosocial High Risk Factors (check all that apply) []Unable to obtain meds []Chronic illness/pain   
[x]Substance abuse  
[]Lack of Family Support [x]Financial stress [x]Isolation  
[x]Inadequate [employer] [x]Suicide attempt(s) [x]Not taking medications []Victim of crime []Developmental Delay 
[]Unable to manage personal needs []Age 72 or older  
[]  Homeless []Ant transportation []Readmission within 30 days []Unemployment []Traumatic Event Psychiatric Advanced Directive: None Family to involve in treatment: None Sexual Orientation: NA 
  
Patient Strengths: Pt. is willing to seek treatment Patient Barriers: Pt has history of non-compliant with medications, poor coping, abusing cocaine and alcohol and has poor insight, 
  
Substance Abuse: Yes. Pt. abuses cocaine and alcohol. JANIE provided pt. with substance abuse education Safety plan: JANIE discussed safety plan. Pt contracts for safety CMHC/ history: Please refer to the psychiatrist and NP or PA note AXIS I:  Schizoaffective disorder, bipolar type. Cocaine use disorder, moderate. Nicotine use disorder, severe. AXIS II:  None. AXIS III:  Gastroesophageal reflux disease. Chronic cervical pain secondary to trauma. Plan of Care: JANIE discussed and encouraged pt. to participate in the following activities: medication management, group/individual therapies, family meetings, psycho education, treatment team meetings to assist with stabilization Initial Discharge Plan: 3-5 days Clinical Summary: Pt. Is a 48year old male with history of Schizoaffective disorder, bipolar type.   Cocaine use disorder, moderate and Nicotine use disorder, severe. Pt. Was hospitalized for ideations to harm self. Pt. 's case was discussed . Sw met with pt to discuss dc planning. Pt. Informed SW he came to the hospital because he was having ideations to harm self and having hallucinations. Pt states the voices will tell him to end his life. Pt. admits he has been off medications. P.t admits to relapsing on cocaine and alcohol. Pt. admits to drinking weekly. Pt. Has been hospitalized both at this facility and [facility] in the past. Pt admits to going to [facility]. Sw discussed safety plan, relapse prevention plan and encouraged pt to attend groups. Pt. Is irritable. Pt. appears  depressed, hopeless and has poor judgement. SW will assist pt with dc planning.   
 
Curt Mcgovern MA, LMHP-R

## 2020-11-04 NOTE — H&P
[address] HISTORY AND PHYSICAL    Name:  Ronel Fair  MR#:   [id number]  :  1970  ACCOUNT #:  [de-identified]  ADMIT DATE:  2020    IDENTIFYING DATA:  The patient is a 80-year-old, single black male, resident of [place], [place], who lives with his mother. He is covered by [facility]. BASIS FOR ADMISSION:  The patient is admitted after again presenting to emergency room with return of suicidal ideas and thoughts to hang himself. He has a long psychiatric history of \"bipolar schizophrenia. \"  He previously had been seen at [address] [place] [address] by Mr. Sruthi Jackson, physician's assistant, until he retired one and a half years ago. The patient has been in custodial intermittently due to his cocaine use and has not had a routine followup thereafter. He previously had been on Depakote ER 1000 mg at bedtime and then had dosing changed to 250 mg in the morning and 500 mg at night. He has been on Abilify between 10 and 20 mg depending on the past record. He is on trazodone between 100 and 200 mg at night depending on the record. The patient reports that at times he has been on haloperidol in addition to the Abilify and benztropine for extrapyramidal symptoms. It is seen that he was admitted to [facility] on 2019 when he was being sent to custodial and instead was diverted to the [facility] for psychotic symptoms. He was admitted to this facility on one prior occasion 10/04/2018 under the care of Dr. Faustino Angelucci for schizoaffective disorder, bipolar type. The patient reports that he is continuing to have auditory hallucinations of a man telling him to [place] end your life. Nobody loves you. \"  He described paranoia and feeling sad. He has had poor sleep off and on. Appetite and energy are low. He has been using crack cocaine and used up all of his money on a crack binge before he ended up coming to the emergency room.   He said mother will be quite angry with him. MEDICAL HISTORY:  Significant for chronic neck pain from an event where he intentionally hung himself with a bed sheet several years ago and has taken ibuprofen 800 mg t.i.d. p.r.n. pain. He has history of gastroesophageal reflux disease, being on Pepcid in the past.    ALLERGIES:  HE IS ALLERGIC TO ONIONS AND DENIED DRUG ALLERGIES. SUBSTANCE ABUSE HISTORY:  He smokes one pack of cigarettes a day and did want Nicorette gum. He says he drinks a six-pack of beer about once a week. He denies ever having had alcohol-related legal problems. He does have one possession of cocaine charge in the past and was on probation for this, but he is now off. He was sent to a drug program at his [facility]. FAMILY HISTORY AND SOCIAL HISTORY:  Family history of psychiatric illness is significant for brother with bipolar disorder. He dropped out of school in 10th grade. He never . He does have one child age 29 who is supportive of him. He lives with his mother, saying he gets along well with her. She is angry about the fact he has used up his money on drugs. MENTAL STATUS EXAMINATION:  Revealed him to be an alert, oriented large black male. Eye contact was fair. Speech was fluent. Mood was unhappy with a congruent odd blunted affect. Thought processing was simplistic, but logical and goal directed. He endorsed paranoid ideas, persecutory ideas, auditory hallucinations of a command nature telling him that he should kill himself and then he had thoughts that he would hang himself by a sheet. He did not actually act on this, though he did at one time in the past.  He denied homicidal ideas. IQ was estimated in the low normal range. Insight and judgment are influenced by his chronic mental illness and his drug abuse. ASSESSMENT:  AXIS I:  Schizoaffective disorder, bipolar type. Cocaine use disorder, moderate. Nicotine use disorder, severe. AXIS II:  None.   AXIS III:  Gastroesophageal reflux disease. Chronic cervical pain secondary to trauma. TREATMENT PLAN:  This patient is admitted voluntarily after self-presentation to emergency room saying that he needed help because he has again relapsed to cocaine use and has been noncompliant with his antipsychotic medications. He did say that he was already having mood swings before he used the cocaine. He is set up to go to the [facility], but says they cannot get him in until 80/1835, and he was uncertain as to what he would do in the meantime. We will continue with individual, group and milieu therapies, art and recreation therapy, case management services, social work services, physical examination and laboratory testing. We will resume him back on the Abilify 20 mg in the morning, trazodone 100 mg at night, Depakote  mg in the morning and 500 mg at night, p.r.n. Haldol, p.r.n. ibuprofen, p.r.n. Nicorette gum. We will try to get any old records from [address] [place] [address]. ESTIMATED LENGTH OF STAY:  10 days. ANTICIPATED DISPOSITION:  Follow up [facility]. PROGNOSIS:  Guarded in light of the fact that the patient has chronic mental illness and concurrent cocaine use.       Jerri Estrada MD      [id number]  D:  11/04/2020 12:59  T:  11/04/2020 15:45  JOB #:  [id number]

## 2020-11-04 NOTE — BSMART NOTE
Group Social work Attendance   did not attend Number of participants 6 Time in 2:30 Time out 1:15 Total Time 45 Interventions/techniques Informed and encouraged  
   declined

## 2020-11-05 LAB — VALPROATE SERPL-MCNC: 36 UG/ML (ref 50–100)

## 2020-11-05 PROCEDURE — 80164 ASSAY DIPROPYLACETIC ACD TOT: CPT

## 2020-11-05 PROCEDURE — 36415 COLL VENOUS BLD VENIPUNCTURE: CPT

## 2020-11-05 PROCEDURE — 99232 SBSQ HOSP IP/OBS MODERATE 35: CPT | Performed by: PSYCHIATRY & NEUROLOGY

## 2020-11-05 PROCEDURE — 65220000003 HC RM SEMIPRIVATE PSYCH

## 2020-11-05 PROCEDURE — 74011250637 HC RX REV CODE- 250/637: Performed by: PSYCHIATRY & NEUROLOGY

## 2020-11-05 RX ORDER — DIVALPROEX SODIUM 500 MG/1
1000 TABLET, EXTENDED RELEASE ORAL
Status: DISCONTINUED | OUTPATIENT
Start: 2020-11-05 | End: 2020-11-09 | Stop reason: HOSPADM

## 2020-11-05 RX ORDER — DOCUSATE SODIUM 100 MG/1
100 CAPSULE, LIQUID FILLED ORAL DAILY
Status: DISCONTINUED | OUTPATIENT
Start: 2020-11-06 | End: 2020-11-09 | Stop reason: HOSPADM

## 2020-11-05 RX ADMIN — BENZTROPINE MESYLATE 0.5 MG: 1 TABLET ORAL at 10:02

## 2020-11-05 RX ADMIN — TRAZODONE HYDROCHLORIDE 100 MG: 100 TABLET ORAL at 20:01

## 2020-11-05 RX ADMIN — ARIPIPRAZOLE 20 MG: 10 TABLET ORAL at 10:02

## 2020-11-05 RX ADMIN — BENZTROPINE MESYLATE 0.5 MG: 1 TABLET ORAL at 20:01

## 2020-11-05 RX ADMIN — DIVALPROEX SODIUM 500 MG: 250 TABLET, DELAYED RELEASE ORAL at 17:42

## 2020-11-05 RX ADMIN — DIVALPROEX SODIUM 250 MG: 250 TABLET, FILM COATED, EXTENDED RELEASE ORAL at 10:02

## 2020-11-05 NOTE — BSMART NOTE
Pt. Is a 48year old male with history of Schizoaffective disorder, bipolar type.  Cocaine use disorder, moderate and Nicotine use disorder, severe. Pt. Was hospitalized for ideations to harm self. SW Contact:  SW attempted to meet with pt to discuss dc planning. SW explained this SW role and encouraged pt to share discharge plan/options such as [facility] residential placement. Pt. declined Pt. \" I don't need a counselor\". \" I already have a plan , I know what I am going to do\". SW explained this SW role and encouraged pt to share discharge plan/options such as [facility] residential placement. Pt. declined Pt. appeared irritable, depressed, hopeless,  guarded and labile.  SW  Will provide pt with supports towards dc.  
  
Maurice Argueta MA, LMHP-R

## 2020-11-05 NOTE — BH NOTES
MHT Note    Patient was not very interactive with peers or staff. Patient only exited his room when he was asked to get his vital signs checked. Patient has refused all of his meals throughout the day even after being encouraged to eat by staff. Patient remained asleep in his room for the majority of the shift. Towards the end of the shift patient was awake and alert in his room. Staff should continue to encourage patient to eat and monitor his intake. Patient was able to contract for safety during the shift. No falls or major issues to report at this time.

## 2020-11-05 NOTE — PROGRESS NOTES
Problem: Suicide  Goal: *STG: Remains safe in hospital  Description: Daily will not engage in self injurious behaviors during his hospitalization. Outcome: Progressing Towards Goal  Goal: *STG: Seeks staff when feelings of self harm or harm towards others arise  Description: Daily will seek staff to express feelings when having thoughts of harming himself/ others. Outcome: Progressing Towards Goal  Goal: *STG: Attends activities and groups  Description: Daily will attend  at least 3-4 groups. Outcome: Progressing Towards Goal  Goal: *LTG:  Develops proactive suicide prevention plan  Description: AEB will develop a proactive suicide prevention plan prior to his discharge. Outcome: Progressing Towards Goal    RN Note: \"Is the order in for my nicotine gum? \"     Patient sitting in common area. Consumed 100% of dinner. Denies concerns at this time. Participated in afternoon group. To continue current treatment plan per provider orders. Staff to assist and guide as needed.

## 2020-11-05 NOTE — BSMART NOTE
ART THERAPY GROUP PROGRESS NOTE Group time:945 The patient did not awaken/get up when called for group.

## 2020-11-05 NOTE — BH NOTES
On 11/4/20 during the 3-11 pm shift, patient spent the majority of this shift lying in bed. Patient ws encouraged to attend group by this writer but did not attend. This patient also did not eat any of his dinner during this shift. Patient appeared to be asleep when this writer as well as other staff checked on him and made rounds. Patient did come out into the milieu for his night medication and did eat some snacks during snack time. After his medication and eating snacks patient returned to his room. Patient does seem polite when this writer speaks to him. This writer has not witnessed any behavorial issues from patient during this shift. This writer will continue to monitor patient for safety.

## 2020-11-05 NOTE — GROUP NOTE
IP  GROUP DOCUMENTATION INDIVIDUAL Group Therapy Note Date: 11/4/2020 Group Start Time: 1 Group End Time: 1945 Group Topic: Medication [facility] 1 ADULT CHEM DEP Jennifer Reyes RN 
 
[facility] GROUP DOCUMENTATION GROUP Group Therapy Note Attendees: 3 Attendance: Did not attend Elton Cunningham RN

## 2020-11-06 PROCEDURE — 65220000003 HC RM SEMIPRIVATE PSYCH

## 2020-11-06 PROCEDURE — 99232 SBSQ HOSP IP/OBS MODERATE 35: CPT | Performed by: PSYCHIATRY & NEUROLOGY

## 2020-11-06 PROCEDURE — 74011250637 HC RX REV CODE- 250/637: Performed by: PSYCHIATRY & NEUROLOGY

## 2020-11-06 RX ORDER — TRAZODONE HYDROCHLORIDE 100 MG/1
300 TABLET ORAL
Status: DISCONTINUED | OUTPATIENT
Start: 2020-11-06 | End: 2020-11-09 | Stop reason: HOSPADM

## 2020-11-06 RX ADMIN — TRAZODONE HYDROCHLORIDE 300 MG: 100 TABLET ORAL at 20:12

## 2020-11-06 RX ADMIN — DOCUSATE SODIUM 100 MG: 100 CAPSULE, LIQUID FILLED ORAL at 08:35

## 2020-11-06 RX ADMIN — ARIPIPRAZOLE 20 MG: 10 TABLET ORAL at 08:32

## 2020-11-06 RX ADMIN — BENZTROPINE MESYLATE 0.5 MG: 1 TABLET ORAL at 08:32

## 2020-11-06 RX ADMIN — HYDROXYZINE PAMOATE 50 MG: 50 CAPSULE ORAL at 08:35

## 2020-11-06 RX ADMIN — NICOTINE POLACRILEX 2 MG: 2 GUM, CHEWING ORAL at 19:03

## 2020-11-06 RX ADMIN — HALOPERIDOL 5 MG: 5 TABLET ORAL at 08:35

## 2020-11-06 RX ADMIN — DIVALPROEX SODIUM 1000 MG: 500 TABLET, FILM COATED, EXTENDED RELEASE ORAL at 21:00

## 2020-11-06 RX ADMIN — BENZTROPINE MESYLATE 0.5 MG: 1 TABLET ORAL at 20:11

## 2020-11-06 NOTE — BSMART NOTE
Pt. Is a 75-year-old male with history of Schizoaffective disorder, bipolar type. Cocaine use disorder, moderate and Nicotine use disorder, severe. Pt.'s case was discussed this a.m. Pt has been up today , ate meals and out among peers. SW met with pt. to discuss dc planning. Pt expressed to feeling a little better today. I was able to sleep, have a little energy and eat food today. Pt. reflected don being off medications for a couple of months. Pt admits because he was feeling better, he stopped takingmedictaions. Pt admits he started to experience irritability, decrease sleep and appetite. Pt. states since he is on medications, he feels a little better. Pt has been out for group and observed interacting with others. Pt provided pt. with mental health education, discussed safety plan and aftercare. Pt. states he plans to follow up with [facility] [place]. Pt. appears depressed, hopeless and has poor judgement. SW will assist pt. with dc planning.   
 
 
Alfred Crawford MA, LMHP-R

## 2020-11-06 NOTE — PROGRESS NOTES
Behavioral Health Progress Note    Admit Date: 11/2/2020  Hospital day 3    Vitals :   Patient Vitals for the past 8 hrs:   BP Temp Pulse Resp   11/06/20 0744 119/75 97.2 °F (36.2 °C) 64 16     Labs:  No results found for this or any previous visit (from the past 24 hour(s)). Meds:   Current Facility-Administered Medications   Medication Dose Route Frequency    docusate sodium (COLACE) capsule 100 mg  100 mg Oral DAILY    divalproex ER (DEPAKOTE ER) 24 hour tablet 1,000 mg  1,000 mg Oral QHS    haloperidoL (HALDOL) tablet 5 mg  5 mg Oral Q8H PRN    nicotine (NICORETTE) gum 2 mg  2 mg Oral Q2H PRN    acetaminophen (TYLENOL) tablet 650 mg  650 mg Oral Q6H PRN    hydrOXYzine pamoate (VISTARIL) capsule 50 mg  50 mg Oral Q4H PRN    traZODone (DESYREL) tablet 100 mg  100 mg Oral QHS    benztropine (COGENTIN) tablet 0.5 mg  0.5 mg Oral BID    ARIPiprazole (ABILIFY) tablet 20 mg  20 mg Oral DAILY      Hospital Problems: Principal Problem:    Schizoaffective disorder, bipolar type ([facility] 75.) (12/26/2016)      Overview: Dr. Mariana Lombardo    Active Problems:    Cocaine use disorder, severe, dependence ([facility] 75.) (11/4/2020)      Nicotine use disorder (11/4/2020)      Cervical pain (neck) (11/4/2020)        Subjective:   Medication side effects: constipation  dry mouth    Mental Status Exam  Sensorium: alert  Orientation: only aware of  place and person  Relations: cooperative  Eye Contact: poor  Appearance: is unkempt  Thought Process: slow rate of thoughts and poor abstract reasoning/computation   Thought Content: paranoia   Suicidal: ideas, auditory halluc   Homicidal: denies   Mood: is anxious and is sad   Affect: odd, constricted  Memory: is impaired and is remote   Concentration: distractable  Abstraction: concrete  Insight: The patient shows little insight    OR Poor  Judgement: is psychologically impaired OR  Poor    Assessment/Plan:   not changed    Continue close observation is more cooperative.     Patient reports that he did have bowel movement and will be taking the Colace twice a day. Nurses report that heHe slept much of the day yesterday then complained he could not sleep at night he does say that he had been taking the trazodone 300 mg at night . For sleep and depression. He says he is still hearing voices that say things like \"you are never going home. \"  He said voices were telling him to hurt himself yesterday but he does not want to harm himself. He says he can ignore it today and not act on it. He does endorse paranoia but says he is always paranoid no matter what he does. He did eat yesterday evening and again this morning. He did receive as needed Haldol and Vistaril yesterday.

## 2020-11-06 NOTE — PROGRESS NOTES
Behavioral Health Progress Note    Admit Date: 11/2/2020  Hospital day 2    Vitals :   Patient Vitals for the past 8 hrs:   BP Temp Pulse Resp SpO2   11/05/20 1640 97/69 97.9 °F (36.6 °C) (!) 56 16 99 %     Labs:    Recent Results (from the past 24 hour(s))   VALPROIC ACID    Collection Time: 11/05/20  7:27 AM   Result Value Ref Range    Valproic acid 36 (L) 50 - 100 ug/ml     Meds:   Current Facility-Administered Medications   Medication Dose Route Frequency    [START ON 11/6/2020] docusate sodium (COLACE) capsule 100 mg  100 mg Oral DAILY    haloperidoL (HALDOL) tablet 5 mg  5 mg Oral Q8H PRN    nicotine (NICORETTE) gum 2 mg  2 mg Oral Q2H PRN    acetaminophen (TYLENOL) tablet 650 mg  650 mg Oral Q6H PRN    hydrOXYzine pamoate (VISTARIL) capsule 50 mg  50 mg Oral Q4H PRN    traZODone (DESYREL) tablet 100 mg  100 mg Oral QHS    benztropine (COGENTIN) tablet 0.5 mg  0.5 mg Oral BID    divalproex DR (DEPAKOTE) tablet 500 mg  500 mg Oral QPM    divalproex ER (DEPAKOTE ER) 24 hour tablet 250 mg  250 mg Oral DAILY    ARIPiprazole (ABILIFY) tablet 20 mg  20 mg Oral DAILY      Hospital Problems: Principal Problem:    Schizoaffective disorder, bipolar type ([facility] (12/26/2016)      Overview: Dr. Mariana Lombardo    Active Problems:    Cocaine use disorder, severe, dependence ([facility].) (11/4/2020)      Nicotine use disorder (11/4/2020)      Cervical pain (neck) (11/4/2020)        Subjective:   Medication side effects: confusion  poor appetite    Mental Status Exam  Sensorium: alert  Orientation: only aware of  place and person  Relations: guarded and passive  Eye Contact: poor  Appearance: is unkempt  Thought Process: slow rate of thoughts and poor abstract reasoning/computation   Thought Content: paranoia   Suicidal: ideas   Homicidal: none   Mood: is anxious and is irritable   Affect: odd, strange, blunted  Memory: is impaired and is remote     Concentration: distractable  Abstraction: concrete  Insight: The patient shows little insight    OR Poor  Judgement: is psychologically impaired OR  Poor    Assessment/Plan:   not changed      Continue close observation,   Nurses report that he has generally been uncooperative and would stay in his room avoiding others most of the day. He had not been eating anything until this evening when he finally did eat. He now says that he has more appetite. He endorses continued paranoia. He says that he gets quite nervous in the dark gets frightened what might happen to him though he does not know why. He endorses continued suicidal ideas saying that he thinks he would harm himself if he was not in the hospital.  He says that he might consider using his sheets to try to hang himself as he tried sometime in the past.  He denies auditory hallucinations. He had not been participating in groups or activities but is now encouraged to do so since he has started to come out of the room somewhat. He is complaining of constipation and we will write for Colace in the morning time. His Depakote level was low at 36. We will switch him to Depakote ER 1000 mg at bedtime. I did send for his records from [facility] ([facility]) and I had seen this individual on 1 occasion back March 31, 2020 after Mr. Torin Gray our physician assistant left. Diagnosis was of schizophrenia. This is been diagnosed while he was in half-way and then was continued diagnosis after he was being followed at the [facility] and then eventually was hospitalized [facility] in the 1990s for \"bipolar schizophrenia. \"  He was hospitalized [facility] 2016 and also to [facility] at sometime in the past.  He apparently was followed through [facility] for not guilty by reason of insanity play following the [facility] admission. Medications at the time which I saw him in referral from   [place] included Haldol 2 mg in the morning, Abilify 20 mg in the morning, trazodone 300 mg at night, Depakote  mg in the morning and Depakote DR 1000 mg at night Cogentin 0.5 mg twice daily.

## 2020-11-06 NOTE — BSMART NOTE
OCCUPATIONAL THERAPY PROGRESS NOTE Group Time:  7317 Attendance: The patient attended full group. Participation: The patient participated fully in the activity. Attention: The patient was able to focus on the activity. Interaction: The patient occasionally  interacts with others. Participated in discussion on stress management. States he is feeling better and mood appeared lighter.

## 2020-11-06 NOTE — BH NOTES
On 11/5/20 during the 3-11 pm shift, patient appeared to have a pleasant day. Patient spent the majority of this shift in the milieu socialzing, watching TV and playing cards with peers and staff. Patient ate all of his dinner and ate snacks during snack time. This patient said about himself, that he is doing better than he was doing two days ago. This writer will continue to monitor patient for safety.

## 2020-11-07 PROCEDURE — 65220000003 HC RM SEMIPRIVATE PSYCH

## 2020-11-07 PROCEDURE — 74011250637 HC RX REV CODE- 250/637: Performed by: PSYCHIATRY & NEUROLOGY

## 2020-11-07 PROCEDURE — 99231 SBSQ HOSP IP/OBS SF/LOW 25: CPT | Performed by: PSYCHIATRY & NEUROLOGY

## 2020-11-07 RX ADMIN — BENZTROPINE MESYLATE 0.5 MG: 1 TABLET ORAL at 08:27

## 2020-11-07 RX ADMIN — BENZTROPINE MESYLATE 0.5 MG: 1 TABLET ORAL at 20:15

## 2020-11-07 RX ADMIN — NICOTINE POLACRILEX 2 MG: 2 GUM, CHEWING ORAL at 14:24

## 2020-11-07 RX ADMIN — ARIPIPRAZOLE 20 MG: 10 TABLET ORAL at 08:27

## 2020-11-07 RX ADMIN — DOCUSATE SODIUM 100 MG: 100 CAPSULE, LIQUID FILLED ORAL at 08:27

## 2020-11-07 RX ADMIN — NICOTINE POLACRILEX 2 MG: 2 GUM, CHEWING ORAL at 17:34

## 2020-11-07 RX ADMIN — HALOPERIDOL 5 MG: 5 TABLET ORAL at 08:27

## 2020-11-07 RX ADMIN — DIVALPROEX SODIUM 1000 MG: 500 TABLET, FILM COATED, EXTENDED RELEASE ORAL at 20:15

## 2020-11-07 RX ADMIN — TRAZODONE HYDROCHLORIDE 300 MG: 100 TABLET ORAL at 20:14

## 2020-11-07 NOTE — PROGRESS NOTES
Problem: Suicide  Goal: *STG: Remains safe in hospital  Description: Daily will not engage in self injurious behaviors during his hospitalization. Outcome: Progressing Towards Goal     Problem: Suicide  Goal: *STG: Seeks staff when feelings of self harm or harm towards others arise  Description: Daily will seek staff to express feelings when having thoughts of harming himself/ others. Outcome: Progressing Towards Goal     Problem: Suicide  Goal: *STG: Attends activities and groups  Description: Daily will attend  at least 3-4 groups. Outcome: Progressing Towards Goal     Problem: Suicide  Goal: Interventions  Outcome: Progressing Towards Goal     Problem: Crack/Cocaine Withdrawal  Goal: *STG: Seeks staff when symptoms of withdrawal increase  Description: AEB daily will seek staff when having increasing withdrawal symptoms. Outcome: Progressing Towards Goal     Problem: Crack/Cocaine Withdrawal  Goal: *STG: Attends activities and groups  Description: Yoseph Collins will attend/ participate in at least 3-4 groups daily. Outcome: Progressing Towards Goal     Problem: Falls - Risk of  Goal: *Absence of Falls  Description: Document Claryce Snowball Fall Risk and appropriate interventions in the flowsheet. Daily will have zero falls while in the hospital.  Outcome: Progressing Towards Goal  Note: Fall Risk Interventions:            Medication Interventions: Teach patient to arise slowly       Patient cooperative, calm, and pleasant, with a smiling appearance. Patient stated that his day has been good and non stressful. Patient also stated that I have been ensuring that I participate in my treatment so as to get well because I have learnt my lesson anyway. Patient compliant with meals at 100%,medications, and group. Patient denies A/V hallucinations, denies SI. Remained in the day area for most part of the shift interacting with peers and staff.  Will continue to monitor

## 2020-11-07 NOTE — PROGRESS NOTES
Problem: Suicide  Goal: *STG: Remains safe in hospital  Description: Daily will not engage in self injurious behaviors during his hospitalization. Outcome: Progressing Towards Goal  Goal: *STG: Attends activities and groups  Description: Daily will attend  at least 3-4 groups. Outcome: Progressing Towards Goal     Problem: Falls - Risk of  Goal: *Absence of Falls  Description: Document Becca Marie Fall Risk and appropriate interventions in the flowsheet. Daily will have zero falls while in the hospital.  Outcome: Progressing Towards Goal  Note: Fall Risk Interventions:        Pt presents euthymic w/ congruent affect. Pt has offered no c/o of si/hi or avh. Has been observed interacting w/ peers and staff appropriately.   Will continue to support

## 2020-11-07 NOTE — PROGRESS NOTES
Behavioral Health Progress Note    Admit Date: 11/2/2020  Hospital day 4    Vitals :24-Hr Vitals/Weight (last day)     Date/Time  Temp  Pulse  BP  MAP (Calculated)  BP 1 Location  BP Patient Position  Resp  SpO2  O2 Device  O2 Flow Rate (L/min)  Level of Consciousness  MEWS Score  Weight    11/07/20 0746  97.6 °F (36.4 °C)  60  97/69  78  Right arm    17        Alert  2      11/06/20 0744  97.2 °F (36.2 °C)  64  119/75  90  Right arm  Sitting  16        Alert  1      Labs:  No results found for this or any previous visit (from the past 24 hour(s)).   Meds:   Current Facility-Administered Medications   Medication Dose Route Frequency    traZODone (DESYREL) tablet 300 mg  300 mg Oral QHS    docusate sodium (COLACE) capsule 100 mg  100 mg Oral DAILY    divalproex ER (DEPAKOTE ER) 24 hour tablet 1,000 mg  1,000 mg Oral QHS    haloperidoL (HALDOL) tablet 5 mg  5 mg Oral Q8H PRN    nicotine (NICORETTE) gum 2 mg  2 mg Oral Q2H PRN    acetaminophen (TYLENOL) tablet 650 mg  650 mg Oral Q6H PRN    hydrOXYzine pamoate (VISTARIL) capsule 50 mg  50 mg Oral Q4H PRN    benztropine (COGENTIN) tablet 0.5 mg  0.5 mg Oral BID    ARIPiprazole (ABILIFY) tablet 20 mg  20 mg Oral DAILY      Hospital Problems: Principal Problem:    Schizoaffective disorder, bipolar type ([facility] 75.) (12/26/2016)      Overview: Dr. Jolanta Watson    Active Problems:    Cocaine use disorder, severe, dependence ([facility] 75.) (11/4/2020)      Nicotine use disorder (11/4/2020)      Cervical pain (neck) (11/4/2020)        Subjective:   Medication side effects: none  none    Mental Status Exam  Sensorium: alert  Orientation: only aware of  place and person  Relations: passive  Eye Contact: poor  Appearance: shows no evidence of impairment  Thought Process: slow rate of thoughts and poor abstract reasoning/computation   Thought Content: paranoia   Suicidal: denies   Homicidal: none   Mood: neutral   Affect: odd, constricted  Memory: is impaired and is remote Concentration: distractable  Abstraction: concrete  Insight: The patient shows little insight    OR Poor  Judgement: is psychologically impaired OR  Poor    Assessment/Plan:   not changed      Continue close observation    Nurses report that he has been more cooperative. He is saying that he is quite hungry after having gone several days of not eating and now has an increased appetite. He says energy is okay. He denies hallucinations and has some degree of paranoia. He denies feeling sad today. He denies other medication side effects and slept good. He now wants us to schedule follow-up at [facility] for him and will see if that is a possibility on Monday.

## 2020-11-07 NOTE — BH NOTES
RAFY Note: The above pt has been alert and cooperative this shift. He has not been a management problem this shift. He verbally has been cooperative this shift. HE has been social with his peers and staff majority of the shift. He verbally contract for safety and denies any self-harm at this time.

## 2020-11-08 PROCEDURE — 74011250637 HC RX REV CODE- 250/637: Performed by: PSYCHIATRY & NEUROLOGY

## 2020-11-08 PROCEDURE — 99231 SBSQ HOSP IP/OBS SF/LOW 25: CPT | Performed by: PSYCHIATRY & NEUROLOGY

## 2020-11-08 PROCEDURE — 65220000003 HC RM SEMIPRIVATE PSYCH

## 2020-11-08 RX ADMIN — BENZTROPINE MESYLATE 0.5 MG: 1 TABLET ORAL at 09:01

## 2020-11-08 RX ADMIN — NICOTINE POLACRILEX 2 MG: 2 GUM, CHEWING ORAL at 09:03

## 2020-11-08 RX ADMIN — BENZTROPINE MESYLATE 0.5 MG: 1 TABLET ORAL at 20:14

## 2020-11-08 RX ADMIN — HALOPERIDOL 5 MG: 5 TABLET ORAL at 09:03

## 2020-11-08 RX ADMIN — NICOTINE POLACRILEX 2 MG: 2 GUM, CHEWING ORAL at 13:23

## 2020-11-08 RX ADMIN — TRAZODONE HYDROCHLORIDE 300 MG: 100 TABLET ORAL at 20:14

## 2020-11-08 RX ADMIN — DIVALPROEX SODIUM 1000 MG: 500 TABLET, FILM COATED, EXTENDED RELEASE ORAL at 20:14

## 2020-11-08 RX ADMIN — NICOTINE POLACRILEX 2 MG: 2 GUM, CHEWING ORAL at 20:54

## 2020-11-08 RX ADMIN — ARIPIPRAZOLE 20 MG: 10 TABLET ORAL at 09:01

## 2020-11-08 NOTE — GROUP NOTE
[facility] GROUP DOCUMENTATION INDIVIDUAL Group Therapy Note Date: 11/7/2020 Group Start Time: 2030 Group End Time: 2045 Group Topic: Nursing [facility] 1 ADULT CHEM DEP Elia Shah, RN 
 
[facility] GROUP DOCUMENTATION GROUP Group Therapy Note Attendees: 10 Attendance: Attended Interventions/techniques: Informed and Supported Follows Directions: Followed directions Interactions: Interacted appropriately Mental Status: Happy Behavior/appearance: Attentive, Cooperative and Motivated Goals Achieved: Able to engage in interactions, Able to listen to others and Able to receive feedback Janette Soto RN

## 2020-11-08 NOTE — GROUP NOTE
[facility]  GROUP DOCUMENTATION INDIVIDUAL Group Therapy Note Date: 11/8/2020 Group Start Time: 1300 Group End Time: 6306 Group Topic: Nursing [facility] 1 ADULT CHEM Deanna Neal, RN 
 
[facility] GROUP DOCUMENTATION GROUP Group Therapy Note Attendees: 9 Attendance: Attended Patient's Goal: Safety plan completion Interventions/techniques: Challenged Follows Directions: Followed directions Interactions: Interacted appropriately Mental Status: Calm Behavior/appearance: Cooperative Goals Achieved: Able to listen to others and Able to reflect/comment on own behavior Additional Notes:   
 
Nadara Scheuermann, RN

## 2020-11-08 NOTE — GROUP NOTE
IP  GROUP DOCUMENTATION INDIVIDUAL Group Therapy Note Date: 11/8/2020 Group Start Time: 1800 Group End Time: 1815 Group Topic: Nursing [facility] 1 ADULT CHEM DEP Sanjay Gallo., RN 
 
IP  GROUP DOCUMENTATION GROUP Group Therapy Note Attendees: 6 Attendance: Attended Interventions/techniques: Challenged and Informed Follows Directions: Followed directions Interactions: Interacted appropriately Mental Status: Calm Behavior/appearance: Attentive and Cooperative Goals Achieved: Able to engage in interactions, Able to listen to others and Able to give feedback to another Cameron Guevara

## 2020-11-08 NOTE — PROGRESS NOTES
Problem: Suicide  Goal: *STG: Remains safe in hospital  Description: Daily will not engage in self injurious behaviors during his hospitalization. Note: Patient will remain safe in the hospital daily  Goal: *STG: Attends activities and groups  Description: Daily will attend  at least 3-4 groups. Outcome: Progressing Towards Goal  Note: Patient will attend scheduled activities and groups daily     Problem: Falls - Risk of  Goal: *Absence of Falls  Description: Document Gris Baker Fall Risk and appropriate interventions in the flowsheet. Daily will have zero falls while in the hospital.  Outcome: Progressing Towards Goal  Note: Fall Risk Interventions:     Medication Interventions: Teach patient to arise slowly     Patient will remain safe in the hospital daily    Patient has been visible and active in the day area during this shift. He has been compliant with medications, vitals, meals, and unit milieu. He has attended groups and been very engaging with peers. Pleasant and no behavioral problems. Attended group and completed safety plan. Denies current thoughts of self harm. Will continue to monitor for safety and support. Clear bilaterally, pupils equal, round and reactive to light.

## 2020-11-08 NOTE — PROGRESS NOTES
Behavioral Health Progress Note    Admit Date: 11/2/2020  Hospital day 5    Vitals :   Patient Vitals for the past 8 hrs:   BP Temp Pulse Resp   11/08/20 1259 103/65 97.4 °F (36.3 °C) 61 18     Labs:  No results found for this or any previous visit (from the past 24 hour(s)). Meds:   Current Facility-Administered Medications   Medication Dose Route Frequency    traZODone (DESYREL) tablet 300 mg  300 mg Oral QHS    docusate sodium (COLACE) capsule 100 mg  100 mg Oral DAILY    divalproex ER (DEPAKOTE ER) 24 hour tablet 1,000 mg  1,000 mg Oral QHS    haloperidoL (HALDOL) tablet 5 mg  5 mg Oral Q8H PRN    nicotine (NICORETTE) gum 2 mg  2 mg Oral Q2H PRN    acetaminophen (TYLENOL) tablet 650 mg  650 mg Oral Q6H PRN    hydrOXYzine pamoate (VISTARIL) capsule 50 mg  50 mg Oral Q4H PRN    benztropine (COGENTIN) tablet 0.5 mg  0.5 mg Oral BID    ARIPiprazole (ABILIFY) tablet 20 mg  20 mg Oral DAILY      Hospital Problems: Principal Problem:    Schizoaffective disorder, bipolar type ([facility] 75.) (12/26/2016)      Overview: Dr. Baylee Powell    Active Problems:    Cocaine use disorder, severe, dependence ([facility] 75.) (11/4/2020)      Nicotine use disorder (11/4/2020)      Cervical pain (neck) (11/4/2020)        Subjective:   Medication side effects: none  none    Mental Status Exam  Sensorium: alert  Orientation: oriented to time, place, person and situation  Relations: cooperative  Eye Contact: poor  Appearance: shows no evidence of impairment  Thought Process: slow rate of thoughts and poor abstract reasoning/computation   Thought Content: paucity of content   Suicidal: denies   Homicidal: none   Mood: is anxious   Affect: odd, constricted  Memory: is impaired and is remote     Concentration: fair  Abstraction: concrete  Insight: The patient shows little insight    OR Fair  Judgement: is psychologically impaired OR  Fair    Assessment/Plan:   improved      Nurses report that the patient has been calm and cooperative.   He has been out of the room and around others more today including playing cards. He had asked for Haldol in the morning saying that he was having some degree of agitation. He says his thoughts are clearer. He denies medication complaints including bowels to be normal and appetite okay. He denies homicidal or suicidal ideas or auditory hallucinations. He says he feels a little bit down still having to be in the hospital but feels himself improving. He plans on living with mother when he goes home. We will be trying to set him up for follow-up tomorrow and we anticipate he will be discharged tomorrow.

## 2020-11-09 VITALS
BODY MASS INDEX: 33.23 KG/M2 | OXYGEN SATURATION: 99 % | WEIGHT: 245 LBS | SYSTOLIC BLOOD PRESSURE: 111 MMHG | DIASTOLIC BLOOD PRESSURE: 71 MMHG | HEART RATE: 79 BPM | TEMPERATURE: 97.1 F | RESPIRATION RATE: 18 BRPM

## 2020-11-09 PROCEDURE — 99238 HOSP IP/OBS DSCHRG MGMT 30/<: CPT | Performed by: PSYCHIATRY & NEUROLOGY

## 2020-11-09 PROCEDURE — 74011250637 HC RX REV CODE- 250/637: Performed by: PSYCHIATRY & NEUROLOGY

## 2020-11-09 RX ORDER — ARIPIPRAZOLE 20 MG/1
20 TABLET ORAL DAILY
Qty: 30 TAB | Refills: 0 | Status: SHIPPED | OUTPATIENT
Start: 2020-11-10

## 2020-11-09 RX ORDER — DOCUSATE SODIUM 100 MG/1
100 CAPSULE, LIQUID FILLED ORAL DAILY
Qty: 30 CAP | Refills: 0 | Status: SHIPPED | OUTPATIENT
Start: 2020-11-10 | End: 2021-02-08

## 2020-11-09 RX ORDER — DIVALPROEX SODIUM 500 MG/1
1000 TABLET, EXTENDED RELEASE ORAL
Qty: 60 TAB | Refills: 0 | Status: SHIPPED | OUTPATIENT
Start: 2020-11-09

## 2020-11-09 RX ORDER — TRAZODONE HYDROCHLORIDE 300 MG/1
300 TABLET ORAL
Qty: 30 TAB | Refills: 0 | Status: SHIPPED | OUTPATIENT
Start: 2020-11-09

## 2020-11-09 RX ADMIN — HYDROXYZINE PAMOATE 50 MG: 50 CAPSULE ORAL at 08:52

## 2020-11-09 RX ADMIN — BENZTROPINE MESYLATE 0.5 MG: 1 TABLET ORAL at 08:49

## 2020-11-09 RX ADMIN — NICOTINE POLACRILEX 2 MG: 2 GUM, CHEWING ORAL at 08:52

## 2020-11-09 RX ADMIN — ARIPIPRAZOLE 20 MG: 10 TABLET ORAL at 08:49

## 2020-11-09 RX ADMIN — NICOTINE POLACRILEX 2 MG: 2 GUM, CHEWING ORAL at 12:09

## 2020-11-09 NOTE — BH NOTES
Pt now scores anxiety 0/10 after receiving PRN anxiety medication. Pt also reports improvement of nicotine withdrawal symptoms. Will continue to monitor.

## 2020-11-09 NOTE — BH NOTES
Patient is alert x3 and ambulatory. Patient denies thoughts of self harm or harm to others at this time. Patient has copy of discharge paperwork with follow up appointment. Patient has prescriptions to be filled at pharmacy of choice. Patient has all personal belongings to include multiple [employer] clothing items. Patient was given return to work form dated 11/10/2020. Patient armband taken and shredded. Patient completed safety plan and it was included in discharge paperwork. Patient discharged to home address and provided with bus pass for transportation. Patient escorted to reception by T at time of discharge.

## 2020-11-09 NOTE — BH NOTES
Pt coming up to desk c/o anxiety 6/10. Pt received PRN Vistaril. Pt also received PRN nicorette. Will continue to monitor for effectiveness.

## 2020-11-09 NOTE — DISCHARGE INSTRUCTIONS
***IMPORTANT NUMBERS***        [address]        [phone]    [address] [facility] [place] [facility]\""       [phone]    Suicide Prevention     [phone]          Patient is alert x3 and ambulatory. Patient has copy of discharge papers with follow up appt. Patient has prescriptions to be filled at pharmacy of choice. Patient has form to return to work dated 11/10/2020. Patient has all personal belongings and has signed form. Patient denies thoughts of self harm or harm to others at this time. Patient armband taken and shredded. At time of discharge, patient was given bus pass for transportation to home address.

## 2020-11-09 NOTE — DISCHARGE SUMMARY
[address] [place] [address]    Name:  Moriah Silverman  MR#:   [id number]  :  1970  ACCOUNT #:  [de-identified]  ADMIT DATE:  2020  DISCHARGE DATE:  2020    IDENTIFYING DATA:  The patient had presented as a 80-year-old single black male who had been living with mother. He showed up at the emergency room with suicidal ideas and thoughts to hang himself, saying he had a long history of bipolar schizophrenia. He had previously been seen at [address] [place] [address] by Doris Mcmullen, physician's assistant, until he retired one and a half years ago. He had been in and out of [facility] intermittently thereafter related to cocaine use. He previously had been on Depakote ER 1000 mg at bedtime and at one point then was on Depakote 250 in the morning and 500 at night. He has also been on Abilify varying between 10 and 20 mg daily, trazodone between 100 and 300 mg daily. At times, he has also been on haloperidol with the Abilify and benztropine for extrapyramidal reactions. He was admitted to [facility] 2019 when he was being sent to [facility] and was diverted to the [facility].  He also had been to this facility on 10/04/2018 under the care of Dr. Jose Manuel Connolly for schizoaffective disorder. Laboratory testing done in the emergency room included a normal CBC, normal urinalysis, comprehensive metabolic panel with a slight elevation of creatinine 1.35 mg/dL with the remainder normal, low valproic acid level 36 mcg/mL, alcohol level 26 mg/dL, urine drug screen positive for cocaine and cannabis, and a negative rapid COVID test.    HOSPITAL COURSE:  The patient was admitted to the locked adult unit where he was afforded individual, group and milieu therapies. While in the hospital, he was treated with the medications of Abilify 20 mg daily, benztropine 0.5 mg b.i.d., Depakote ER 1000 mg at bedtime, Colace 100 mg daily, p.r.n. Haldol - receiving two doses, p.r.n.  Vistaril for anxiety - receiving one dose, Nicorette gum p.r.n., trazodone 300 mg at bedtime. Mood improved, and he was able to contract for safety. By discharge, he was denying homicidal or suicidal ideas, hallucinations or delusions. He did continue to have a strange affect, paucity of thought content. He felt that he was back in his baseline state and ready for discharge. He could not return back to [facility] as he had not been seen for a prolonged time and they were no longer receiving his particular insurance. CONDITION ON DISCHARGE:  Fair. PROGNOSIS:  Fair. ASSESSMENT:  AXIS I:  Schizoaffective disorder, bipolar type. Cocaine use disorder, moderate. Nicotine use disorder, severe. AXIS II:  None. AXIS III:  Gastroesophageal reflux disease. Chronic cervical pain secondary to trauma. DISPOSITION:  Discharged to self. Follow up with private practice through his insurance. Follow up with own primary care provider. MEDICATIONS:  1. Abilify 20 mg tablet one daily, #30.  2.  Benztropine 0.5 mg b.i.d., #60.  3.  Divalproex  mg tablet two at bedtime, #60.  4.  Docusate sodium 100 mg capsule one daily, #30, no refill. 5.  Trazodone 300 mg tablet one at bedtime, #30, no refill.       Sophie Iniguez MD      [id number]  D:  11/09/2020 11:20  T:  11/09/2020 11:54  JOB #:  [id number]

## 2020-11-09 NOTE — BSMART NOTE
SOCIAL WORK GROUP THERAPY PROGRESS NOTE Group Time:  10:30am 
 
Group Topic:  Coping Skills    C D Issues Group Participation:   
 
Pt moderately involved during group discussion, remained attentive and volunteered to read parts of handouts. Although denied anger issues he was open to listening about process. Explored \"my body's\" anger warning signs as well as common triggers. Differences between Assertive, Aggressive & Non-Assertive Behaviors. In both case above only shook head in agreement, with no personal self disclosure.

## 2020-11-09 NOTE — BSMART NOTE
Pt. Is a 59-year-old male with history of Schizoaffective disorder, bipolar type.  Cocaine use disorder, moderate and Nicotine use disorder, severe.  
 
Pt.s' case was discussed this a.m . Pt will be dc today. SW met with pt to discuss dc plan. Pt expressed he feels better. Pt. denies ideations and hallucinations. SW discussed safety plan and relapse prevention plan and aftercare. Pt. Has a new patient appointment scheduled for 11/17/20 @ 10:30 a.m with Dr. [facility] [address] [place] [address], [address] [place] [address]  Phone: [phone]. Pt. Requested a bus pass home.   
 
 
Polly Thomas MA, LMHP-R

## 2020-11-09 NOTE — SUICIDE SAFETY PLAN
SAFETY PLAN    A suicide Safety Plan is a document that supports someone when they are having thoughts of suicide. Warning Signs that indicate a suicidal crisis may be developing: What (situations, thoughts, feelings, body sensations, behaviors, etc.) do you experience that lets you know you are beginning to think about suicide? 1. Stopping my medications  2. Increased heartrate  3. Feeling overwhelmed    Internal Coping Strategies:  What things can I do (relaxation techniques, hobbies, physical activities, etc.) to take my mind off my problems without contacting another person? 1. Read  2. Take a nap  3. Go to work    [employer] and social settings that provide distraction: Who can I call or where can I go to distract me? 1. Name: MOM  Phone: [phone]  2. Name: Work  Phone: [phone]       People whom I can ask for help: Who can I call when I need help - for example, friends, family, clergy, someone else? 1. Name: MOM                Phone: [phone]  2. Name: Leonie Barragan     Phone: [phone]      Professionals or [address] [place] [address] Blvd I can contact during a crisis: Who can I call for help - for example, my doctor, my psychiatrist, my psychologist, a mental health provider, a suicide hotline? 1. Clinician Name: Dr Michael England   Phone: [phone]    2. Clinician Name: Dr Petrona Nieves  Phone: [phone]        3. Suicide Prevention Lifeline: 6-675-662-TALK (3490)    4. [facility]: 912      Making the environment safe: How can I make my environment (house/apartment/living space) safer? For example, can I remove guns, medications, and other items? 1. Remove sharp items   2.  Remove drug items and paraphernalia

## 2020-11-09 NOTE — GROUP NOTE
DOTTIE  GROUP DOCUMENTATION INDIVIDUAL Group Therapy Note Date: 11/9/2020 Group Start Time: 0830 Group End Time: 0900 Group Topic: Nursing [facility] ADULT CHEM DEP Jose A SINCLAIR  GROUP DOCUMENTATION GROUP Group Therapy Note Attendees: 15 Attendance: Attended Patient's Goal:  Unit Guidelines Interventions/techniques: Informed Follows Directions: Followed directions Interactions: Interacted appropriately Mental Status: Calm Behavior/appearance: Cooperative Goals Achieved: Able to give feedback to another Nelda Carrizalesic
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
## 2020-12-09 ENCOUNTER — HOSPITAL ENCOUNTER (EMERGENCY)
Age: 50
Discharge: HOME | End: 2020-12-09
Attending: EMERGENCY MEDICINE
Payer: MEDICAID

## 2020-12-09 ENCOUNTER — APPOINTMENT (OUTPATIENT)
Dept: CT IMAGING | Age: 50
End: 2020-12-09
Attending: EMERGENCY MEDICINE
Payer: MEDICAID

## 2020-12-09 VITALS
RESPIRATION RATE: 16 BRPM | DIASTOLIC BLOOD PRESSURE: 72 MMHG | SYSTOLIC BLOOD PRESSURE: 116 MMHG | WEIGHT: 226 LBS | OXYGEN SATURATION: 100 % | HEART RATE: 62 BPM | BODY MASS INDEX: 30.61 KG/M2 | HEIGHT: 72 IN | TEMPERATURE: 97.8 F

## 2020-12-09 DIAGNOSIS — R10.84 ABDOMINAL PAIN, GENERALIZED: Primary | ICD-10-CM

## 2020-12-09 LAB
ALBUMIN SERPL-MCNC: 3.2 G/DL (ref 3.4–5)
ALBUMIN/GLOB SERPL: 0.8 {RATIO} (ref 0.8–1.7)
ALP SERPL-CCNC: 102 U/L (ref 45–117)
ALT SERPL-CCNC: 39 U/L (ref 16–61)
ANION GAP SERPL CALC-SCNC: 2 MMOL/L (ref 3–18)
APPEARANCE UR: CLEAR
AST SERPL-CCNC: 24 U/L (ref 10–38)
ATRIAL RATE: 66 BPM
BASOPHILS # BLD: 0 K/UL (ref 0–0.1)
BASOPHILS NFR BLD: 0 % (ref 0–2)
BILIRUB SERPL-MCNC: 0.3 MG/DL (ref 0.2–1)
BILIRUB UR QL: NEGATIVE
BUN SERPL-MCNC: 18 MG/DL (ref 7–18)
BUN/CREAT SERPL: 14 (ref 12–20)
CALCIUM SERPL-MCNC: 8.9 MG/DL (ref 8.5–10.1)
CALCULATED P AXIS, ECG09: 37 DEGREES
CALCULATED R AXIS, ECG10: 8 DEGREES
CALCULATED T AXIS, ECG11: 13 DEGREES
CHLORIDE SERPL-SCNC: 108 MMOL/L (ref 100–111)
CO2 SERPL-SCNC: 29 MMOL/L (ref 21–32)
COLOR UR: YELLOW
CREAT SERPL-MCNC: 1.3 MG/DL (ref 0.6–1.3)
DIAGNOSIS, 93000: NORMAL
DIFFERENTIAL METHOD BLD: ABNORMAL
EOSINOPHIL # BLD: 0.5 K/UL (ref 0–0.4)
EOSINOPHIL NFR BLD: 6 % (ref 0–5)
ERYTHROCYTE [DISTWIDTH] IN BLOOD BY AUTOMATED COUNT: 13.8 % (ref 11.6–14.5)
GLOBULIN SER CALC-MCNC: 3.8 G/DL (ref 2–4)
GLUCOSE SERPL-MCNC: 94 MG/DL (ref 74–99)
GLUCOSE UR STRIP.AUTO-MCNC: NEGATIVE MG/DL
HCT VFR BLD AUTO: 36.3 % (ref 36–48)
HGB BLD-MCNC: 12.4 G/DL (ref 13–16)
HGB UR QL STRIP: NEGATIVE
KETONES UR QL STRIP.AUTO: NEGATIVE MG/DL
LEUKOCYTE ESTERASE UR QL STRIP.AUTO: NEGATIVE
LIPASE SERPL-CCNC: 58 U/L (ref 73–393)
LYMPHOCYTES # BLD: 2.2 K/UL (ref 0.9–3.6)
LYMPHOCYTES NFR BLD: 27 % (ref 21–52)
MAGNESIUM SERPL-MCNC: 1.9 MG/DL (ref 1.6–2.6)
MCH RBC QN AUTO: 28.7 PG (ref 24–34)
MCHC RBC AUTO-ENTMCNC: 34.2 G/DL (ref 31–37)
MCV RBC AUTO: 84 FL (ref 74–97)
MONOCYTES # BLD: 0.6 K/UL (ref 0.05–1.2)
MONOCYTES NFR BLD: 7 % (ref 3–10)
NEUTS SEG # BLD: 4.8 K/UL (ref 1.8–8)
NEUTS SEG NFR BLD: 60 % (ref 40–73)
NITRITE UR QL STRIP.AUTO: NEGATIVE
P-R INTERVAL, ECG05: 166 MS
PH UR STRIP: 5.5 [PH] (ref 5–8)
PLATELET # BLD AUTO: 215 K/UL (ref 135–420)
PMV BLD AUTO: 10.7 FL (ref 9.2–11.8)
POTASSIUM SERPL-SCNC: 4.1 MMOL/L (ref 3.5–5.5)
PROT SERPL-MCNC: 7 G/DL (ref 6.4–8.2)
PROT UR STRIP-MCNC: NEGATIVE MG/DL
Q-T INTERVAL, ECG07: 392 MS
QRS DURATION, ECG06: 86 MS
QTC CALCULATION (BEZET), ECG08: 410 MS
RBC # BLD AUTO: 4.32 M/UL (ref 4.7–5.5)
SODIUM SERPL-SCNC: 139 MMOL/L (ref 136–145)
SP GR UR REFRACTOMETRY: 1.01 (ref 1–1.03)
UROBILINOGEN UR QL STRIP.AUTO: 0.2 EU/DL (ref 0.2–1)
VENTRICULAR RATE, ECG03: 66 BPM
WBC # BLD AUTO: 8 K/UL (ref 4.6–13.2)

## 2020-12-09 PROCEDURE — 96375 TX/PRO/DX INJ NEW DRUG ADDON: CPT

## 2020-12-09 PROCEDURE — 74011000636 HC RX REV CODE- 636: Performed by: EMERGENCY MEDICINE

## 2020-12-09 PROCEDURE — 80053 COMPREHEN METABOLIC PANEL: CPT

## 2020-12-09 PROCEDURE — 74011250636 HC RX REV CODE- 250/636: Performed by: EMERGENCY MEDICINE

## 2020-12-09 PROCEDURE — 83690 ASSAY OF LIPASE: CPT

## 2020-12-09 PROCEDURE — 81003 URINALYSIS AUTO W/O SCOPE: CPT

## 2020-12-09 PROCEDURE — 74177 CT ABD & PELVIS W/CONTRAST: CPT

## 2020-12-09 PROCEDURE — 83735 ASSAY OF MAGNESIUM: CPT

## 2020-12-09 PROCEDURE — 96374 THER/PROPH/DIAG INJ IV PUSH: CPT

## 2020-12-09 PROCEDURE — 99285 EMERGENCY DEPT VISIT HI MDM: CPT

## 2020-12-09 PROCEDURE — 93005 ELECTROCARDIOGRAM TRACING: CPT

## 2020-12-09 PROCEDURE — 85025 COMPLETE CBC W/AUTO DIFF WBC: CPT

## 2020-12-09 RX ORDER — KETOROLAC TROMETHAMINE 15 MG/ML
15 INJECTION, SOLUTION INTRAMUSCULAR; INTRAVENOUS ONCE
Status: COMPLETED | OUTPATIENT
Start: 2020-12-09 | End: 2020-12-09

## 2020-12-09 RX ORDER — MORPHINE SULFATE 4 MG/ML
4 INJECTION, SOLUTION INTRAMUSCULAR; INTRAVENOUS ONCE
Status: DISCONTINUED | OUTPATIENT
Start: 2020-12-09 | End: 2020-12-09 | Stop reason: HOSPADM

## 2020-12-09 RX ORDER — ONDANSETRON 2 MG/ML
4 INJECTION INTRAMUSCULAR; INTRAVENOUS ONCE
Status: COMPLETED | OUTPATIENT
Start: 2020-12-09 | End: 2020-12-09

## 2020-12-09 RX ADMIN — IOPAMIDOL 100 ML: 612 INJECTION, SOLUTION INTRAVENOUS at 09:01

## 2020-12-09 RX ADMIN — SODIUM CHLORIDE 1000 ML: 900 INJECTION, SOLUTION INTRAVENOUS at 07:53

## 2020-12-09 RX ADMIN — KETOROLAC TROMETHAMINE 15 MG: 15 INJECTION, SOLUTION INTRAMUSCULAR; INTRAVENOUS at 08:04

## 2020-12-09 RX ADMIN — ONDANSETRON 4 MG: 2 INJECTION INTRAMUSCULAR; INTRAVENOUS at 07:53
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
## 2020-12-09 NOTE — DISCHARGE INSTRUCTIONS

## 2020-12-09 NOTE — ED NOTES
Patient refused morphine. Pt states his sister said percocet will work better for pain. as per pt, resides in an APT along, [address] with rail, 10 stairs walk up with rail, PTA, pt (I) with amb, (I) with ADLs

## 2020-12-11 ENCOUNTER — VIRTUAL VISIT (OUTPATIENT)
Dept: FAMILY MEDICINE CLINIC | Age: 50
End: 2020-12-11

## 2020-12-15 ENCOUNTER — VIRTUAL VISIT (OUTPATIENT)
Dept: FAMILY MEDICINE CLINIC | Age: 50
End: 2020-12-15
Payer: MEDICAID

## 2020-12-15 DIAGNOSIS — S31.119A STAB WOUND OF ABDOMEN, INITIAL ENCOUNTER: Primary | ICD-10-CM

## 2020-12-15 PROCEDURE — 99213 OFFICE O/P EST LOW 20 MIN: CPT | Performed by: NURSE PRACTITIONER
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
## 2021-04-07 ENCOUNTER — HOSPITAL ENCOUNTER (EMERGENCY)
Age: 51
Discharge: HOME | End: 2021-04-07
Attending: EMERGENCY MEDICINE
Payer: MEDICAID

## 2021-04-07 VITALS
OXYGEN SATURATION: 100 % | BODY MASS INDEX: 26.57 KG/M2 | SYSTOLIC BLOOD PRESSURE: 138 MMHG | DIASTOLIC BLOOD PRESSURE: 85 MMHG | RESPIRATION RATE: 18 BRPM | WEIGHT: 196.2 LBS | HEIGHT: 72 IN | TEMPERATURE: 97.7 F | HEART RATE: 92 BPM

## 2021-04-07 DIAGNOSIS — R45.851 SUICIDAL IDEATION: Primary | ICD-10-CM

## 2021-04-07 DIAGNOSIS — F19.20 POLYSUBSTANCE (EXCLUDING OPIOIDS) DEPENDENCE (HCC): ICD-10-CM

## 2021-04-07 LAB
ALBUMIN SERPL-MCNC: 3.9 G/DL (ref 3.4–5)
ALBUMIN/GLOB SERPL: 1.1 {RATIO} (ref 0.8–1.7)
ALP SERPL-CCNC: 93 U/L (ref 45–117)
ALT SERPL-CCNC: 23 U/L (ref 16–61)
AMPHET UR QL SCN: NEGATIVE
ANION GAP SERPL CALC-SCNC: 8 MMOL/L (ref 3–18)
APPEARANCE UR: CLEAR
AST SERPL-CCNC: 18 U/L (ref 10–38)
BARBITURATES UR QL SCN: NEGATIVE
BASOPHILS # BLD: 0 K/UL (ref 0–0.1)
BASOPHILS NFR BLD: 0 % (ref 0–2)
BENZODIAZ UR QL: NEGATIVE
BILIRUB SERPL-MCNC: 1.1 MG/DL (ref 0.2–1)
BILIRUB UR QL: NEGATIVE
BUN SERPL-MCNC: 10 MG/DL (ref 7–18)
BUN/CREAT SERPL: 9 (ref 12–20)
CALCIUM SERPL-MCNC: 9.5 MG/DL (ref 8.5–10.1)
CANNABINOIDS UR QL SCN: NEGATIVE
CHLORIDE SERPL-SCNC: 107 MMOL/L (ref 100–111)
CO2 SERPL-SCNC: 25 MMOL/L (ref 21–32)
COCAINE UR QL SCN: POSITIVE
COLOR UR: YELLOW
CREAT SERPL-MCNC: 1.06 MG/DL (ref 0.6–1.3)
DIFFERENTIAL METHOD BLD: ABNORMAL
EOSINOPHIL # BLD: 0.1 K/UL (ref 0–0.4)
EOSINOPHIL NFR BLD: 1 % (ref 0–5)
ERYTHROCYTE [DISTWIDTH] IN BLOOD BY AUTOMATED COUNT: 14 % (ref 11.6–14.5)
ETHANOL SERPL-MCNC: <3 MG/DL (ref 0–3)
GLOBULIN SER CALC-MCNC: 3.4 G/DL (ref 2–4)
GLUCOSE SERPL-MCNC: 107 MG/DL (ref 74–99)
GLUCOSE UR STRIP.AUTO-MCNC: NEGATIVE MG/DL
HCT VFR BLD AUTO: 37.4 % (ref 36–48)
HDSCOM,HDSCOM: ABNORMAL
HGB BLD-MCNC: 13.4 G/DL (ref 13–16)
HGB UR QL STRIP: NEGATIVE
KETONES UR QL STRIP.AUTO: NEGATIVE MG/DL
LEUKOCYTE ESTERASE UR QL STRIP.AUTO: NEGATIVE
LYMPHOCYTES # BLD: 1.5 K/UL (ref 0.9–3.6)
LYMPHOCYTES NFR BLD: 14 % (ref 21–52)
MCH RBC QN AUTO: 29.4 PG (ref 24–34)
MCHC RBC AUTO-ENTMCNC: 35.8 G/DL (ref 31–37)
MCV RBC AUTO: 82 FL (ref 74–97)
METHADONE UR QL: NEGATIVE
MONOCYTES # BLD: 0.7 K/UL (ref 0.05–1.2)
MONOCYTES NFR BLD: 6 % (ref 3–10)
NEUTS SEG # BLD: 8.3 K/UL (ref 1.8–8)
NEUTS SEG NFR BLD: 78 % (ref 40–73)
NITRITE UR QL STRIP.AUTO: NEGATIVE
OPIATES UR QL: NEGATIVE
PCP UR QL: NEGATIVE
PH UR STRIP: 6 [PH] (ref 5–8)
PLATELET # BLD AUTO: 230 K/UL (ref 135–420)
PMV BLD AUTO: 10.3 FL (ref 9.2–11.8)
POTASSIUM SERPL-SCNC: 3.1 MMOL/L (ref 3.5–5.5)
PROT SERPL-MCNC: 7.3 G/DL (ref 6.4–8.2)
PROT UR STRIP-MCNC: NEGATIVE MG/DL
RBC # BLD AUTO: 4.56 M/UL (ref 4.35–5.65)
SODIUM SERPL-SCNC: 140 MMOL/L (ref 136–145)
SP GR UR REFRACTOMETRY: 1.01 (ref 1–1.03)
UROBILINOGEN UR QL STRIP.AUTO: 1 EU/DL (ref 0.2–1)
WBC # BLD AUTO: 10.6 K/UL (ref 4.6–13.2)

## 2021-04-07 PROCEDURE — 80053 COMPREHEN METABOLIC PANEL: CPT

## 2021-04-07 PROCEDURE — 99282 EMERGENCY DEPT VISIT SF MDM: CPT

## 2021-04-07 PROCEDURE — 80307 DRUG TEST PRSMV CHEM ANLYZR: CPT

## 2021-04-07 PROCEDURE — 82077 ASSAY SPEC XCP UR&BREATH IA: CPT

## 2021-04-07 PROCEDURE — 85025 COMPLETE CBC W/AUTO DIFF WBC: CPT

## 2021-04-07 PROCEDURE — 81003 URINALYSIS AUTO W/O SCOPE: CPT

## 2021-04-07 NOTE — ED TRIAGE NOTES
Pt arrives with C/O SI. States that he could use anything around him to kill himself. Pt states that he is compliant with his meds but Police and mother states that he has not taken them in months. Pt states that he has abused drugs and ETOH in the last few hours. Pt voluntarily here but wants to be admitted to [facility] instead of [facility] facility.

## 2021-04-07 NOTE — ED PROVIDER NOTES
EMERGENCY DEPARTMENT HISTORY AND PHYSICAL EXAM      Date: 4/7/2021  Patient Name: Carlito Meehan    History of Presenting Illness     Chief Complaint   Patient presents with   [address] Problem     Suicidal        History (Context): Carlito Meehan is a 48 y.o. gentleman with psychiatric conditions as noted below presents with subacute onset, severe, focal suicidal ideation without exacerbating/relieving features or other associated symptoms. The patient does not have a plan. Is stressors are that he smokes crack cocaine and drinks alcohol. The patient does have access to guns. The patient does not have a history of suicide attempts. On review of systems, the patient denies fever, chills, rashes, hallucinations, homicidal ideation, staying up all night, recent changes to meds. PCP: Jori Chacon NP    Current Outpatient Medications   Medication Sig Dispense Refill    ARIPiprazole (ABILIFY) 20 mg tablet Take 1 Tab by mouth daily. Indications: additional medications to treat depression 30 Tab 0    divalproex ER (DEPAKOTE ER) 500 mg ER tablet Take 2 Tabs by mouth nightly. Indications: bipolar depression 60 Tab 0    traZODone (DESYREL) 300 mg tablet Take 1 Tab by mouth nightly. Indications: major depressive disorder 30 Tab 0    ibuprofen (MOTRIN) 800 mg tablet Take 1 Tab by mouth every eight (8) hours as needed for Pain (take with food, not to exceed 3 tabs in 24hrs). 45 Tab 1    omeprazole (PRILOSEC) 20 mg capsule Take 1 Cap by mouth Daily (before breakfast). 30 Cap 2       Past History     Past Medical History:  Past Medical History:   Diagnosis Date    Bipolar disorder ([facility]     GERD (gastroesophageal reflux disease)     Schizophrenia ([facility]     Suicidal behavior        Past Surgical History:  No past surgical history on file.     Family History:  Family History   Problem Relation Age of Onset    Diabetes Mother     Hypertension Father     Prostate Cancer Father     Heart Disease Father Social History:  Social History     Tobacco Use    Smoking status: Current Every Day Smoker     Packs/day: 1.00    Smokeless tobacco: Never Used   Substance Use Topics    Alcohol use: Yes     Comment: \"drunk everyday\"    Drug use: Yes     Types: Cocaine     Comment: smokes crack       Allergies: Allergies   Allergen Reactions    Onion Rash       PMH, PSH, family history, social history, allergies reviewed with the patient with significant items noted above. Review of Systems   As per HPI, otherwise reviewed and negative. Physical Exam     Vitals:    04/07/21 0742   BP: 138/85   Pulse: 92   Resp: 18   Temp: 97.7 °F (36.5 °C)   SpO2: 100%   Weight: 89 kg (196 lb 3.2 oz)   Height: 6' (1.829 m)       Gen: Well-appearing, in no acute distress  HEENT: Normocephalic, sclera anicteric  Cardiovascular: Normal rate, regular rhythm, no murmurs, rubs, gallops. Pulses intact and equal distally. Pulmonary: No respiratory distress. No stridor. Clear lungs. ABD: Soft, nontender, nondistended. Neuro: Alert. Oriented. Normal speech. Normal mentation. Psych: Appearance: Normal, Speech: Normal, Thought content: Suicidal, Thought process: Normal, Mood: Will not answer, Affect: Somewhat annoyed  : No CVA tenderness  EXT: Moves all extremities well. No cyanosis or clubbing. Skin: Warm and well-perfused. Diagnostic Study Results     Labs -     Recent Results (from the past 12 hour(s))   CBC WITH AUTOMATED DIFF    Collection Time: 04/07/21  7:55 AM   Result Value Ref Range    WBC 10.6 4.6 - 13.2 K/uL    RBC 4.56 4.35 - 5.65 M/uL    HGB 13.4 13.0 - 16.0 g/dL    HCT 37.4 36.0 - 48.0 %    MCV 82.0 74.0 - 97.0 FL    MCH 29.4 24.0 - 34.0 PG    MCHC 35.8 31.0 - 37.0 g/dL    RDW 14.0 11.6 - 14.5 %    PLATELET 218 412 - 145 K/uL    MPV 10.3 9.2 - 11.8 FL    NEUTROPHILS 78 (H) 40 - 73 %    LYMPHOCYTES 14 (L) 21 - 52 %    MONOCYTES 6 3 - 10 %    EOSINOPHILS 1 0 - 5 %    BASOPHILS 0 0 - 2 %    ABS.  NEUTROPHILS 8.3 (H) 1.8 - 8.0 K/UL    ABS. LYMPHOCYTES 1.5 0.9 - 3.6 K/UL    ABS. MONOCYTES 0.7 0.05 - 1.2 K/UL    ABS. EOSINOPHILS 0.1 0.0 - 0.4 K/UL    ABS. BASOPHILS 0.0 0.0 - 0.1 K/UL    DF AUTOMATED     METABOLIC PANEL, COMPREHENSIVE    Collection Time: 04/07/21  7:55 AM   Result Value Ref Range    Sodium 140 136 - 145 mmol/L    Potassium 3.1 (L) 3.5 - 5.5 mmol/L    Chloride 107 100 - 111 mmol/L    CO2 25 21 - 32 mmol/L    Anion gap 8 3.0 - 18 mmol/L    Glucose 107 (H) 74 - 99 mg/dL    BUN 10 7.0 - 18 MG/DL    Creatinine 1.06 0.6 - 1.3 MG/DL    BUN/Creatinine ratio 9 (L) 12 - 20      GFR est AA >60 >60 ml/min/1.73m2    GFR est non-AA >60 >60 ml/min/1.73m2    Calcium 9.5 8.5 - 10.1 MG/DL    Bilirubin, total PENDING MG/DL    ALT (SGPT) PENDING U/L    AST (SGOT) PENDING U/L    Alk. phosphatase PENDING U/L    Protein, total PENDING g/dL    Albumin 3.9 3.4 - 5.0 g/dL    Globulin PENDING g/dL    A-G Ratio PENDING         Radiologic Studies -   No orders to display     CT Results  (Last 48 hours)    None        CXR Results  (Last 48 hours)    None            Medical Decision Making   I am the first provider for this patient. I reviewed the vital signs, available nursing notes, past medical history, past surgical history, family history and social history. Vital Signs-Reviewed the patient's vital signs. Records Reviewed: Personally, on initial evaluation    MDM:   Patient presents with suicidal ideation. The patient does have other medical complaints. The patient would like substance abuse treatment. exam significant for normal appearance, normal speech, normal thought processes, mood stable, affect congruent. Patient is moderate risk for suicide.    DDX considered: Suicidal ideation, malingering, secondary gain, exacerbation of psychiatric dysfunction      Plan:   Physician hold  Medical clearance with basic labs  Psychiatric consultation    Orders as below:  Orders Placed This Encounter    CBC WITH AUTOMATED DIFF    COMPREHENSIVE METABOLIC PANEL    URINALYSIS W/ RFLX MICROSCOPIC    Urine Drug Screen    ETHYL ALCOHOL    EKG, 12 LEAD, INITIAL        ED Course:   ED Course as of Apr 07 0832 Wed Apr 07, 2021   0817 I informed the patient that he cannot go to [facility] in a voluntary status. I am informed that the patient eloped. The patient had 2 police officers with him. As I was not present when he left, I am not sure if he was in police custody. [DT]      ED Course User Index  [DT] Anand Dinero MD             Current Discharge Medication List          Procedures:  Procedures      Critical Care Time:       Diagnosis     Clinical Impression:   1. Suicidal ideation    2. Polysubstance (excluding opioids) dependence ([facility]        Signed,  Edouard Hernandez MD  Emergency Physician  [facility]  [facility]    As a voice dictation software was utilized to dictate this note, minor word transpositions can occur. I apologize for confusing wording and typographic errors. Please feel free to contact me for clarification.

## 2021-11-04 ENCOUNTER — APPOINTMENT (OUTPATIENT)
Dept: GENERAL RADIOLOGY | Age: 51
End: 2021-11-04
Attending: STUDENT IN AN ORGANIZED HEALTH CARE EDUCATION/TRAINING PROGRAM
Payer: MEDICAID

## 2021-11-04 ENCOUNTER — HOSPITAL ENCOUNTER (EMERGENCY)
Age: 51
Discharge: HOME | End: 2021-11-04
Attending: STUDENT IN AN ORGANIZED HEALTH CARE EDUCATION/TRAINING PROGRAM
Payer: MEDICAID

## 2021-11-04 VITALS
DIASTOLIC BLOOD PRESSURE: 89 MMHG | OXYGEN SATURATION: 98 % | SYSTOLIC BLOOD PRESSURE: 140 MMHG | TEMPERATURE: 98.5 F | BODY MASS INDEX: 28.58 KG/M2 | HEART RATE: 68 BPM | WEIGHT: 211 LBS | HEIGHT: 72 IN | RESPIRATION RATE: 18 BRPM

## 2021-11-04 DIAGNOSIS — M79.672 PAIN IN BOTH FEET: Primary | ICD-10-CM

## 2021-11-04 DIAGNOSIS — M79.671 PAIN IN BOTH FEET: Primary | ICD-10-CM

## 2021-11-04 PROCEDURE — 96372 THER/PROPH/DIAG INJ SC/IM: CPT

## 2021-11-04 PROCEDURE — 74011250636 HC RX REV CODE- 250/636: Performed by: STUDENT IN AN ORGANIZED HEALTH CARE EDUCATION/TRAINING PROGRAM

## 2021-11-04 PROCEDURE — 99282 EMERGENCY DEPT VISIT SF MDM: CPT

## 2021-11-04 PROCEDURE — 73630 X-RAY EXAM OF FOOT: CPT

## 2021-11-04 RX ORDER — DIVALPROEX SODIUM 250 MG/1
250 TABLET, EXTENDED RELEASE ORAL DAILY
COMMUNITY

## 2021-11-04 RX ORDER — KETOROLAC TROMETHAMINE 15 MG/ML
15 INJECTION, SOLUTION INTRAMUSCULAR; INTRAVENOUS ONCE
Status: COMPLETED | OUTPATIENT
Start: 2021-11-04 | End: 2021-11-04

## 2021-11-04 RX ORDER — HALOPERIDOL 2 MG/1
2 TABLET ORAL DAILY
COMMUNITY

## 2021-11-04 RX ORDER — BENZTROPINE MESYLATE 0.5 MG/1
0.5 TABLET ORAL 2 TIMES DAILY
COMMUNITY

## 2021-11-04 RX ADMIN — KETOROLAC TROMETHAMINE 15 MG: 15 INJECTION, SOLUTION INTRAMUSCULAR; INTRAVENOUS at 09:12

## 2021-11-04 NOTE — ED PROVIDER NOTES
EMERGENCY DEPARTMENT HISTORY AND PHYSICAL EXAM    I have evaluated the patient at 8:45 AM      Date: 11/4/2021  Patient Name: Alexis Stafford    History of Presenting Illness     Chief Complaint   Patient presents with    Foot Pain    Foot Swelling         History Provided By: Patient  Location/Duration/Severity/Modifying factors   68-year-old male with history of psychiatric disorder presenting to the emergency department with complaints of bilateral foot pain. He reports that the pain has been ongoing for the past year. His been seeing a podiatrist.  He reports that he was told that he has bone spurs on his feet that are causing him his issues. On October 26, patient received a steroid injection sent home with steroid Dosepak with instructions to continue with Tylenol and Motrin at home. Patient states that he feels like the pain is worse. Denies any injuries. He has been ambulatory on his feet. He reports that the pain is located in the metatarsal area of his dorsal feet bilaterally. Does not radiate. No ankle pain          PCP: Howard Mera NP    Current Outpatient Medications   Medication Sig Dispense Refill    divalproex ER (Depakote ER) 250 mg ER tablet Take 250 mg by mouth daily.  benztropine (COGENTIN) 0.5 mg tablet Take 0.5 mg by mouth two (2) times a day.  haloperidoL (HALDOL) 2 mg tablet Take 2 mg by mouth daily.  ARIPiprazole (ABILIFY) 20 mg tablet Take 1 Tab by mouth daily. Indications: additional medications to treat depression 30 Tab 0    divalproex ER (DEPAKOTE ER) 500 mg ER tablet Take 2 Tabs by mouth nightly. Indications: bipolar depression (Patient taking differently: Take 500 mg by mouth nightly. Indications: bipolar depression) 60 Tab 0    traZODone (DESYREL) 300 mg tablet Take 1 Tab by mouth nightly. Indications: major depressive disorder (Patient taking differently: Take 100 mg by mouth nightly.  Indications: major depressive disorder) 30 Tab 0    ibuprofen (MOTRIN) 800 mg tablet Take 1 Tab by mouth every eight (8) hours as needed for Pain (take with food, not to exceed 3 tabs in 24hrs). 39 Tab 1       Past History     Past Medical History:  Past Medical History:   Diagnosis Date    Acute psychosis ([facility] [address] [facility]     Bipolar disorder ([facility]     GERD (gastroesophageal reflux disease)     [facility]     Schizophrenia ([facility]     Suicidal behavior        Past Surgical History:  History reviewed. No pertinent surgical history. Family History:  Family History   Problem Relation Age of Onset    Diabetes Mother     Hypertension Father     Prostate Cancer Father     Heart Disease Father        Social History:  Social History     Tobacco Use    Smoking status: Current Every Day Smoker     Packs/day: 1.00    Smokeless tobacco: Never Used   Substance Use Topics    Alcohol use: Yes     Comment: rare    Drug use: Not Currently     Types: Cocaine     Comment: smokes crack       Allergies: Allergies   Allergen Reactions    Onion Rash         Review of Systems       Review of Systems   Constitutional: Negative for activity change, chills, diaphoresis, fatigue and fever. Respiratory: Negative for cough, chest tightness, shortness of breath, wheezing and stridor. Cardiovascular: Negative for chest pain and palpitations. Gastrointestinal: Negative for abdominal distention, abdominal pain, constipation, diarrhea, nausea and vomiting. Musculoskeletal: Positive for arthralgias. Negative for back pain, joint swelling and myalgias. Bilateral foot pain   Skin: Negative for rash and wound. Neurological: Negative for dizziness, weakness and headaches. Psychiatric/Behavioral: Negative for agitation. The patient is not nervous/anxious.           Physical Exam     Visit Vitals  BP (!) 140/89 (BP 1 Location: Left upper arm, BP Patient Position: At rest)   Pulse 68   Temp 98.5 °F (36.9 °C)   Resp 18   Ht 6' (1.829 m)   Wt 95.7 kg (211 lb)   SpO2 98%   BMI 28.62 kg/m²         Physical Exam  Constitutional:       General: He is not in acute distress. Appearance: He is not toxic-appearing. HENT:      Head: Normocephalic and atraumatic. Mouth/Throat:      Mouth: Mucous membranes are moist.   Eyes:      Extraocular Movements: Extraocular movements intact. Pupils: Pupils are equal, round, and reactive to light. Cardiovascular:      Rate and Rhythm: Normal rate and regular rhythm. Heart sounds: Normal heart sounds. No murmur heard. No friction rub. No gallop. Pulmonary:      Effort: Pulmonary effort is normal.      Breath sounds: Normal breath sounds. Abdominal:      General: There is no distension. Palpations: Abdomen is soft. There is no mass. Tenderness: There is no abdominal tenderness. There is no guarding. Hernia: No hernia is present. Musculoskeletal:         General: Signs of injury present. No swelling, tenderness or deformity. Cervical back: Normal range of motion and neck supple. Comments: Tenderness palpation over the distal metatarsals of the dorsal feet bilaterally   Skin:     General: Skin is warm and dry. Capillary Refill: Capillary refill takes less than 2 seconds. Findings: No rash. Neurological:      General: No focal deficit present. Mental Status: He is alert and oriented to person, place, and time. Sensory: No sensory deficit. Motor: No weakness. Psychiatric:         Mood and Affect: Mood normal.           Diagnostic Study Results     Labs -  No results found for this or any previous visit (from the past 12 hour(s)). Radiologic Studies -   XR FOOT RT MIN 3 V   Final Result      Right foot:   -No clearly acute osseous findings. Degenerative/chronic changes as above. Small   chronic appearing ossific fragment along the outer calcaneocuboid joint may be   posttraumatic. Left foot:   -No acute osseous findings. Dorsal midfoot soft tissue swelling. Degenerative/chronic bony changes as above. XR FOOT LT MIN 3 V   Final Result      Right foot:   -No clearly acute osseous findings. Degenerative/chronic changes as above. Small   chronic appearing ossific fragment along the outer calcaneocuboid joint may be   posttraumatic. Left foot:   -No acute osseous findings. Dorsal midfoot soft tissue swelling. Degenerative/chronic bony changes as above. Medical Decision Making   I am the first provider for this patient. I reviewed the vital signs, available nursing notes, past medical history, past surgical history, family history and social history. Vital Signs-Reviewed the patient's vital signs. Records Reviewed: Nursing Notes and Previous Radiology Studies (Time of Review: 8:45 AM)    ED Course: Progress Notes, Reevaluation, and Consults:         Provider Notes (Medical Decision Making):   MDM  Number of Diagnoses or Management Options  Pain in both feet  Diagnosis management comments: 40-year-old male presenting for evaluation of bilateral foot pain. No obvious deformities or swelling appreciated. Will obtain x-rays to rule out any worsening injury or undiagnosed fractures I do not suspect at this time. We'll give him a shot of IM Toradol. Ultimately, patient will be discharged to follow-up with his chest and continue his pain regiment. 0919:  No evidence of any new acute fracture. Stable chronic degenerative findings visualized. Patient has been updated. He has been reassured. Instructed to continue pain regimen and to follow-up with podiatrist.  Patient advised on follow-up with primary care doctor and ED return precautions. All patient's questions and concerns were addressed. Patient verbalizes understanding and agreement with the discharge plan. Diagnosis     Clinical Impression:   1.  Pain in both feet        Disposition: home    Follow-up Information     Follow up With Specialties Details Why Contact Info [id number] [facility] EMERGENCY DEPT Emergency Medicine  If symptoms worsen [address] [facility] [place]    Alexandre Yuen, YECENIA Nurse Practitioner Call   [address] [id number] [phone]      your podiatrist  Call              Patient's Medications   Start Taking    No medications on file   Continue Taking    ARIPIPRAZOLE (ABILIFY) 20 MG TABLET    Take 1 Tab by mouth daily. Indications: additional medications to treat depression    BENZTROPINE (COGENTIN) 0.5 MG TABLET    Take 0.5 mg by mouth two (2) times a day. DIVALPROEX ER (DEPAKOTE ER) 250 MG ER TABLET    Take 250 mg by mouth daily. DIVALPROEX ER (DEPAKOTE ER) 500 MG ER TABLET    Take 2 Tabs by mouth nightly. Indications: bipolar depression    HALOPERIDOL (HALDOL) 2 MG TABLET    Take 2 mg by mouth daily. IBUPROFEN (MOTRIN) 800 MG TABLET    Take 1 Tab by mouth every eight (8) hours as needed for Pain (take with food, not to exceed 3 tabs in 24hrs). TRAZODONE (DESYREL) 300 MG TABLET    Take 1 Tab by mouth nightly. Indications: major depressive disorder   These Medications have changed    No medications on file   Stop Taking    OMEPRAZOLE (PRILOSEC) 20 MG CAPSULE    Take 1 Cap by mouth Daily (before breakfast). Disclaimer: Sections of this note are dictated using utilizing voice recognition software. Minor typographical errors may be present. If questions arise, please do not hesitate to contact me or call our department.

## 2021-11-04 NOTE — ED TRIAGE NOTES
Patient c/o bilateral foot swelling and pain x one year. He states being prescribed Medrol dose vincent 4mg on October 26 related to complaint. Advises no relief with medrol dose vincent. He states known heel spurs to bilateral fee.

## 2022-02-03 ENCOUNTER — HOSPITAL ENCOUNTER (EMERGENCY)
Age: 52
Discharge: HOME | End: 2022-02-04
Attending: STUDENT IN AN ORGANIZED HEALTH CARE EDUCATION/TRAINING PROGRAM
Payer: MEDICAID

## 2022-02-03 ENCOUNTER — APPOINTMENT (OUTPATIENT)
Dept: CT IMAGING | Age: 52
End: 2022-02-03
Attending: STUDENT IN AN ORGANIZED HEALTH CARE EDUCATION/TRAINING PROGRAM
Payer: MEDICAID

## 2022-02-03 VITALS
SYSTOLIC BLOOD PRESSURE: 144 MMHG | OXYGEN SATURATION: 97 % | WEIGHT: 190 LBS | HEIGHT: 72 IN | RESPIRATION RATE: 20 BRPM | DIASTOLIC BLOOD PRESSURE: 98 MMHG | TEMPERATURE: 98 F | HEART RATE: 82 BPM | BODY MASS INDEX: 25.73 KG/M2

## 2022-02-03 DIAGNOSIS — S02.641A CLOSED FRACTURE OF RIGHT RAMUS OF MANDIBLE, INITIAL ENCOUNTER (HCC): Primary | ICD-10-CM
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
## 2022-02-03 PROCEDURE — 74011250637 HC RX REV CODE- 250/637: Performed by: STUDENT IN AN ORGANIZED HEALTH CARE EDUCATION/TRAINING PROGRAM

## 2022-02-03 PROCEDURE — 70486 CT MAXILLOFACIAL W/O DYE: CPT

## 2022-02-03 PROCEDURE — 70450 CT HEAD/BRAIN W/O DYE: CPT

## 2022-02-03 PROCEDURE — 99282 EMERGENCY DEPT VISIT SF MDM: CPT

## 2022-02-03 RX ORDER — OXYCODONE AND ACETAMINOPHEN 5; 325 MG/1; MG/1
1 TABLET ORAL
Status: COMPLETED | OUTPATIENT
Start: 2022-02-03 | End: 2022-02-03

## 2022-02-03 RX ORDER — OXYCODONE AND ACETAMINOPHEN 5; 325 MG/1; MG/1
1 TABLET ORAL
Qty: 12 TABLET | Refills: 0 | Status: SHIPPED | OUTPATIENT
Start: 2022-02-03 | End: 2022-02-06

## 2022-02-03 RX ADMIN — OXYCODONE HYDROCHLORIDE AND ACETAMINOPHEN 1 TABLET: 5; 325 TABLET ORAL at 20:50
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
## 2022-02-15 ENCOUNTER — VIRTUAL VISIT (OUTPATIENT)
Dept: FAMILY MEDICINE CLINIC | Age: 52
End: 2022-02-15
Payer: MEDICAID

## 2022-02-15 DIAGNOSIS — S02.602D CLOSED FRACTURE OF LEFT SIDE OF MANDIBULAR BODY WITH ROUTINE HEALING, SUBSEQUENT ENCOUNTER: Primary | ICD-10-CM

## 2022-02-15 PROCEDURE — 99213 OFFICE O/P EST LOW 20 MIN: CPT | Performed by: NURSE PRACTITIONER

## 2022-02-15 RX ORDER — IBUPROFEN 800 MG/1
800 TABLET ORAL
Qty: 45 TABLET | Refills: 1 | Status: SHIPPED | OUTPATIENT
Start: 2022-02-15 | End: 2022-03-16 | Stop reason: ALTCHOICE

## 2022-02-15 RX ORDER — OXYCODONE AND ACETAMINOPHEN 7.5; 325 MG/1; MG/1
1 TABLET ORAL
Qty: 20 TABLET | Refills: 0 | Status: SHIPPED | OUTPATIENT
Start: 2022-02-15 | End: 2022-02-20

## 2022-02-15 NOTE — PROGRESS NOTES
Ralph Gonzalez is a 46 y.o. male who was seen by synchronous (real-time) audio-video technology on 2/15/2022 for No chief complaint on file. Assessment & Plan:   Diagnoses and all orders for this visit:    1. Closed fracture of left side of mandibular body with routine healing, subsequent encounter  -     oxyCODONE-acetaminophen (PERCOCET 7.5) 7.5-325 mg per tablet; Take 1 Tablet by mouth every six (6) hours as needed for Pain for up to 5 days. Max Daily Amount: 4 Tablets. -     ibuprofen (MOTRIN) 800 mg tablet; Take 1 Tablet by mouth every eight (8) hours as needed for Pain (take with food, not to exceed 3 tabs in 24hrs). Indications: pain      Follow-up and Dispositions    · Return if symptoms worsen or fail to improve. I spent at least 30 minutes on this visit with this established patient. 712  Subjective:   Patient states he had surgery on his jaw for a fracture on 2/4/2022. Comments he did have a follow up visit with surgeon on Friday and advised to take otc tylenol/ibuprofen for pain. Patient states he has been taking otc medication without improvement, taking BC powder, tylenol, ibuprofen. Patient was prescribed percocet #12 after surgery, states medication didn't help with pain. Comments pain is worse to chin region. States he is scheduled to have sutures removed 3/1/2022. Prior to Admission medications    Medication Sig Start Date End Date Taking? Authorizing Provider   divalproex ER (Depakote ER) 250 mg ER tablet Take 250 mg by mouth daily. OtherMick MD   benztropine (COGENTIN) 0.5 mg tablet Take 0.5 mg by mouth two (2) times a day. Mick Estrada MD   haloperidoL (HALDOL) 2 mg tablet Take 2 mg by mouth daily. Mick Estrada MD   ARIPiprazole (ABILIFY) 20 mg tablet Take 1 Tab by mouth daily. Indications: additional medications to treat depression 11/10/20   Jorge Flores MD   divalproex ER (DEPAKOTE ER) 500 mg ER tablet Take 2 Tabs by mouth nightly.  Indications: bipolar depression  Patient taking differently: Take 500 mg by mouth nightly. Indications: bipolar depression 11/9/20   Armaan Henning MD   traZODone (DESYREL) 300 mg tablet Take 1 Tab by mouth nightly. Indications: major depressive disorder  Patient taking differently: Take 100 mg by mouth nightly. Indications: major depressive disorder 11/9/20   rAmaan Henning MD   ibuprofen (MOTRIN) 800 mg tablet Take 1 Tab by mouth every eight (8) hours as needed for Pain (take with food, not to exceed 3 tabs in 24hrs). 5/15/20   Nallely Rob NP     Patient Active Problem List   Diagnosis Code    Debility R53.81    Schizoaffective disorder, bipolar type ([address] F25.0    SOB (shortness of breath) on exertion R06.02    Bronchitis due to tobacco use J40, Z72.0    Abnormal CT scan of lung R91.8    Left inguinal hernia K40.90    Gynecomastia, male N58    Cocaine use disorder, severe, dependence ([address] F14.20    Nicotine use disorder F17.200    Cervical pain (neck) M54.2     Patient Active Problem List    Diagnosis Date Noted    Cocaine use disorder, severe, dependence ([address] 11/04/2020    Nicotine use disorder 11/04/2020    Cervical pain (neck) 11/04/2020    Gynecomastia, male 05/23/2018    Left inguinal hernia 01/12/2018    SOB (shortness of breath) on exertion 11/01/2017    Bronchitis due to tobacco use 11/01/2017    Abnormal CT scan of lung 11/01/2017    Schizoaffective disorder, bipolar type ([address] 12/26/2016    Debility 12/25/2016     Allergies   Allergen Reactions    Onion Rash     Past Medical History:   Diagnosis Date    Acute psychosis ([address]     Bipolar disorder ([address]     GERD (gastroesophageal reflux disease)     Polysubstance abuse ([address]     Schizophrenia ([address]     Suicidal behavior      History reviewed. No pertinent surgical history.   Family History   Problem Relation Age of Onset    Diabetes Mother     Hypertension Father     Prostate Cancer Father     Heart Disease Father      Social History Tobacco Use    Smoking status: Current Every Day Smoker     Packs/day: 1.00    Smokeless tobacco: Never Used   Substance Use Topics    Alcohol use: Yes     Comment: rare       ROS    Objective:   No flowsheet data found. General: alert, cooperative, no distress   Mental  status: normal mood, behavior, speech, dress, motor activity, and thought processes, able to follow commands   HENT: NCAT   Neck: no visualized mass   Resp: no respiratory distress   Neuro: no gross deficits   Skin: no discoloration or lesions of concern on visible areas   Psychiatric: normal affect, consistent with stated mood, no evidence of hallucinations     Additional exam findings: We discussed the expected course, resolution and complications of the diagnosis(es) in detail. Medication risks, benefits, costs, interactions, and alternatives were discussed as indicated. I advised him to contact the office if his condition worsens, changes or fails to improve as anticipated. He expressed understanding with the diagnosis(es) and plan. Miri Kumari, was evaluated through a synchronous (real-time) audio-video encounter. The patient (or guardian if applicable) is aware that this is a billable service, which includes applicable co-pays. Verbal consent to proceed has been obtained. The visit was conducted pursuant to the emergency declaration under the [facility], [address] [place] [address] [facility] authority and the [employer] and [employer] General Act. Patient identification was verified, and a caregiver was present when appropriate. The patient was located at home in a state where the provider was licensed to provide care.     Xochitl Lyman NP

## 2022-02-15 NOTE — PROGRESS NOTES
No chief complaint on file. 1. \"Have you been to the ER, urgent care clinic since your last visit? Hospitalized since your last visit? \" No    2. \"Have you seen or consulted any other health care providers outside of the [address] [place] [address] since your last visit? \" No     3. For patients aged 39-70: Has the patient had a colonoscopy? No     If the patient is female:    4. For patients aged 41-77: Has the patient had a mammogram within the past 2 years? n/a    5. For patients aged 21-65: Has the patient had a pap smear? NA - based on age    Learning Assessment 6/21/2018   PRIMARY LEARNER Patient   HIGHEST LEVEL OF EDUCATION - PRIMARY LEARNER  DID NOT GRADUATE HIGH SCHOOL   BARRIERS PRIMARY LEARNER OTHER   PRIMARY LANGUAGE ENGLISH   LEARNER PREFERENCE PRIMARY DEMONSTRATION   ANSWERED BY self   RELATIONSHIP SELF     3 most recent PHQ Screens 2/15/2022   Little interest or pleasure in doing things Not at all   Feeling down, depressed, irritable, or hopeless Not at all   Total Score PHQ 2 0   Trouble falling or staying asleep, or sleeping too much -   Feeling tired or having little energy -   Poor appetite, weight loss, or overeating -   Feeling bad about yourself - or that you are a failure or have let yourself or your family down -   Trouble concentrating on things such as school, work, reading, or watching TV -   Moving or speaking so slowly that other people could have noticed; or the opposite being so fidgety that others notice -   Thoughts of being better off dead, or hurting yourself in some way -   PHQ 9 Score -   How difficult have these problems made it for you to do your work, take care of your home and get along with others -     No flowsheet data found. Abuse Screening Questionnaire 6/21/2018   Do you ever feel afraid of your partner? N   Are you in a relationship with someone who physically or mentally threatens you? N   Is it safe for you to go home?  Etelvina Wynn

## 2022-03-08 ENCOUNTER — NURSE TRIAGE (OUTPATIENT)
Dept: OTHER | Facility: CLINIC | Age: 52
End: 2022-03-08

## 2022-03-08 NOTE — TELEPHONE ENCOUNTER
Received call from [address] [place] [address] at [facility] with Red Flag Complaint. Subjective: Caller states \"face is swollen, jaw wired shut\"     Current Symptoms: had surgery recently, pain is not controlled     Onset: 1 week ago; gradual    Associated Symptoms: NA    Pain Severity: 10/10; aching and stabbing; constant    Temperature: denies temp   What has been tried: took percocet, but does not have any more     LMP: NA Pregnant: NA    Recommended disposition: See in Office Today    Care advice provided, patient verbalizes understanding; denies any other questions or concerns; instructed to call back for any new or worsening symptoms. Patient/Caller agrees with recommended disposition; writer provided warm transfer to Fina at [facility] for appointment scheduling    Attention Provider: Thank you for allowing me to participate in the care of your patient. The patient was connected to triage in response to information provided to the ECC. Please do not respond through this encounter as the response is not directed to a shared pool.       Reason for Disposition   SEVERE pain (e.g., excruciating, unable to do any normal activities)    Protocols used: FACE PAIN-ADULT-[place]

## 2022-03-09 ENCOUNTER — VIRTUAL VISIT (OUTPATIENT)
Dept: FAMILY MEDICINE CLINIC | Age: 52
End: 2022-03-09

## 2022-03-09 ENCOUNTER — TELEPHONE (OUTPATIENT)
Dept: FAMILY MEDICINE CLINIC | Age: 52
End: 2022-03-09

## 2022-03-09 NOTE — PROGRESS NOTES
Ralph Gonzalez is a 46 y.o. male who was seen by synchronous (real-time) audio-video technology on 3/9/2022 for No chief complaint on file. Assessment & Plan:   Diagnoses and all orders for this visit:    1. ERRONEOUS ENCOUNTER--DISREGARD        1365 Lost connection with patient. Attempted to call patient without response. 12  Subjective:   Patient states he mandible came 'out of place' again and he had to have surgery on 3/2/2022 with Dr. Merly Calderon. Per patient his surgeon advised him he will need to receive pain medication from his PCP. States his next follow up isn't until the end of the month. Prior to Admission medications    Medication Sig Start Date End Date Taking? Authorizing Provider   ibuprofen (MOTRIN) 800 mg tablet Take 1 Tablet by mouth every eight (8) hours as needed for Pain (take with food, not to exceed 3 tabs in 24hrs). Indications: pain 2/15/22   Ananya ARNOLD NP   divalproex ER (Depakote ER) 250 mg ER tablet Take 250 mg by mouth daily. Other, MD Mick   benztropine (COGENTIN) 0.5 mg tablet Take 0.5 mg by mouth two (2) times a day. OtherMick MD   haloperidoL (HALDOL) 2 mg tablet Take 2 mg by mouth daily. Mick Estrada MD   ARIPiprazole (ABILIFY) 20 mg tablet Take 1 Tab by mouth daily. Indications: additional medications to treat depression 11/10/20   Jorge Flores MD   divalproex ER (DEPAKOTE ER) 500 mg ER tablet Take 2 Tabs by mouth nightly. Indications: bipolar depression  Patient taking differently: Take 500 mg by mouth nightly. Indications: bipolar depression 11/9/20   Jorge Flores MD   traZODone (DESYREL) 300 mg tablet Take 1 Tab by mouth nightly. Indications: major depressive disorder  Patient taking differently: Take 100 mg by mouth nightly. Indications: major depressive disorder 11/9/20   Jorge Flores MD         ROS    Objective:   No flowsheet data found.    General: alert, cooperative, no distress   Mental  status: normal mood, behavior, speech, dress, motor activity, and thought processes, able to follow commands   HENT: NCAT   Neck: no visualized mass   Resp: no respiratory distress   Neuro: no gross deficits   Skin: no discoloration or lesions of concern on visible areas   Psychiatric: normal affect, consistent with stated mood, no evidence of hallucinations     Additional exam findings: We discussed the expected course, resolution and complications of the diagnosis(es) in detail. Medication risks, benefits, costs, interactions, and alternatives were discussed as indicated. I advised him to contact the office if his condition worsens, changes or fails to improve as anticipated. He expressed understanding with the diagnosis(es) and plan. Kailee Mejia, was evaluated through a synchronous (real-time) audio-video encounter. The patient (or guardian if applicable) is aware that this is a billable service, which includes applicable co-pays. Verbal consent to proceed has been obtained. The visit was conducted pursuant to the emergency declaration under the [facility], [address] [place] [address] authority and the [employer] General Act. Patient identification was verified, and a caregiver was present when appropriate. The patient was located at home in a state where the provider was licensed to provide care.     Rocio Marquez NP

## 2022-03-09 NOTE — TELEPHONE ENCOUNTER
Pt states his phone  during virtual visit;   Requesting call back from nurse to provide remaining information     [phone]

## 2022-03-16 ENCOUNTER — VIRTUAL VISIT (OUTPATIENT)
Dept: FAMILY MEDICINE CLINIC | Age: 52
End: 2022-03-16
Payer: MEDICAID

## 2022-03-16 DIAGNOSIS — S02.602D CLOSED FRACTURE OF LEFT SIDE OF MANDIBULAR BODY WITH ROUTINE HEALING, SUBSEQUENT ENCOUNTER: Primary | ICD-10-CM

## 2022-03-16 PROCEDURE — 99213 OFFICE O/P EST LOW 20 MIN: CPT | Performed by: NURSE PRACTITIONER

## 2022-03-16 RX ORDER — TRIPROLIDINE/PSEUDOEPHEDRINE 2.5MG-60MG
600 TABLET ORAL
Qty: 473 ML | Refills: 0 | Status: SHIPPED | OUTPATIENT
Start: 2022-03-16

## 2022-03-16 RX ORDER — HYDROCODONE BITARTRATE AND ACETAMINOPHEN 7.5; 325 MG/15ML; MG/15ML
15 SOLUTION ORAL
Qty: 420 ML | Refills: 0 | Status: SHIPPED | OUTPATIENT
Start: 2022-03-16 | End: 2022-03-23

## 2022-03-16 NOTE — PROGRESS NOTES
Dayan Chapa is a 46 y.o. male who was seen by synchronous (real-time) audio-video technology on 3/16/2022 for Surgical Follow-up    Assessment & Plan:   Diagnoses and all orders for this visit:    1. Closed fracture of left side of mandibular body with routine healing, subsequent encounter  -     HYDROcodone-acetaminophen (HYCET) 0.5-21.7 mg/mL oral solution; Take 15 mL by mouth four (4) times daily as needed for Pain for up to 7 days. Max Daily Amount: 30 mg.  -     ibuprofen (ADVIL;MOTRIN) 100 mg/5 mL suspension; Take 30 mL by mouth three (3) times daily as needed (pain, take with food). Follow-up and Dispositions    · Return if symptoms worsen or fail to improve. I spent at least 20 minutes on this visit with this established patient. 712  Subjective:   Patient states he mandible came 'out of place' again and he had to have surgery on 3/2/2022 with Dr. Carson Chapman. Per patient his surgeon advised him he will need to receive pain medication from his PCP. States his next follow up isn't until 3/28/2022. Reports he was advised to take ibuprofen 3-800mg (3-4 times per day) for pain which hasn't been effective. Reports he has been experiencing headaches from the pain. Comments the left side of face is swollen and also reports his mouth is wired shut. Prior to Admission medications    Medication Sig Start Date End Date Taking? Authorizing Provider   ibuprofen (MOTRIN) 800 mg tablet Take 1 Tablet by mouth every eight (8) hours as needed for Pain (take with food, not to exceed 3 tabs in 24hrs). Indications: pain 2/15/22   Danii Ellis T, NP   divalproex ER (Depakote ER) 250 mg ER tablet Take 250 mg by mouth daily. Other, MD Mick   benztropine (COGENTIN) 0.5 mg tablet Take 0.5 mg by mouth two (2) times a day. Other, MD Mick   haloperidoL (HALDOL) 2 mg tablet Take 2 mg by mouth daily. Other, Phys, MD   ARIPiprazole (ABILIFY) 20 mg tablet Take 1 Tab by mouth daily.  Indications: additional medications to treat depression 11/10/20   Charity Davenport MD   divalproex ER (DEPAKOTE ER) 500 mg ER tablet Take 2 Tabs by mouth nightly. Indications: bipolar depression  Patient taking differently: Take 500 mg by mouth nightly. Indications: bipolar depression 11/9/20   Charity Davenport MD   traZODone (DESYREL) 300 mg tablet Take 1 Tab by mouth nightly. Indications: major depressive disorder  Patient taking differently: Take 100 mg by mouth nightly. Indications: major depressive disorder 11/9/20   Charity Davenport MD     Patient Active Problem List   Diagnosis Code    Debility R53.81    Schizoaffective disorder, bipolar type ([address] F25.0    SOB (shortness of breath) on exertion R06.02    Bronchitis due to tobacco use J40, Z72.0    Abnormal CT scan of lung R91.8    Left inguinal hernia K40.90    Gynecomastia, male N58    Cocaine use disorder, severe, dependence ([address] F14.20    Nicotine use disorder F17.200    Cervical pain (neck) M54.2     Patient Active Problem List    Diagnosis Date Noted    Cocaine use disorder, severe, dependence ([address] 11/04/2020    Nicotine use disorder 11/04/2020    Cervical pain (neck) 11/04/2020    Gynecomastia, male 05/23/2018    Left inguinal hernia 01/12/2018    SOB (shortness of breath) on exertion 11/01/2017    Bronchitis due to tobacco use 11/01/2017    Abnormal CT scan of lung 11/01/2017    Schizoaffective disorder, bipolar type ([address] 12/26/2016    Debility 12/25/2016     Allergies   Allergen Reactions    Onion Rash     Past Medical History:   Diagnosis Date    Acute psychosis ([address]     Bipolar disorder ([address]     GERD (gastroesophageal reflux disease)     Polysubstance abuse ([address]     Schizophrenia ([address]     Suicidal behavior      No past surgical history on file.   Family History   Problem Relation Age of Onset    Diabetes Mother     Hypertension Father     Prostate Cancer Father     Heart Disease Father      Social History     Tobacco Use    Smoking status: Current Every Day Smoker     Packs/day: 1.00    Smokeless tobacco: Never Used   Substance Use Topics    Alcohol use: Yes     Comment: rare       ROS    Objective:   No flowsheet data found. General: alert, cooperative, no distress   Mental  status: normal mood, behavior, speech, dress, motor activity, and thought processes, able to follow commands   HENT: NCAT   Neck: no visualized mass   Resp: no respiratory distress   Neuro: no gross deficits   Skin: no discoloration or lesions of concern on visible areas   Psychiatric: normal affect, consistent with stated mood, no evidence of hallucinations     Additional exam findings: We discussed the expected course, resolution and complications of the diagnosis(es) in detail. Medication risks, benefits, costs, interactions, and alternatives were discussed as indicated. I advised him to contact the office if his condition worsens, changes or fails to improve as anticipated. He expressed understanding with the diagnosis(es) and plan. Boris Fletcher, was evaluated through a synchronous (real-time) audio-video encounter. The patient (or guardian if applicable) is aware that this is a billable service, which includes applicable co-pays. Verbal consent to proceed has been obtained. The visit was conducted pursuant to the emergency declaration under the [facility], [address] [place] [address] [facility] authority and the [employer] and [employer] Act. Patient identification was verified, and a caregiver was present when appropriate. The patient was located at home in a state where the provider was licensed to provide care.     Joana Casey NP

## 2022-03-16 NOTE — PROGRESS NOTES
Chief Complaint   Patient presents with    Surgical Follow-up       1. \"Have you been to the ER, urgent care clinic since your last visit? Hospitalized since your last visit? \" No    2. \"Have you seen or consulted any other health care providers outside of the [address] [place] [address] since your last visit? \" No     3. For patients aged 39-70: Has the patient had a colonoscopy? No     If the patient is female:    4. For patients aged 41-77: Has the patient had a mammogram within the past 2 years? NA - based on age    11. For patients aged 21-65: Has the patient had a pap smear? NA - based on age    Learning Assessment 6/21/2018   PRIMARY LEARNER Patient   HIGHEST LEVEL OF EDUCATION - PRIMARY LEARNER  DID NOT GRADUATE HIGH SCHOOL   BARRIERS PRIMARY LEARNER OTHER   PRIMARY LANGUAGE ENGLISH   LEARNER PREFERENCE PRIMARY DEMONSTRATION   ANSWERED BY self   RELATIONSHIP SELF     3 most recent PHQ Screens 3/9/2022   Little interest or pleasure in doing things Not at all   Feeling down, depressed, irritable, or hopeless Not at all   Total Score PHQ 2 0   Trouble falling or staying asleep, or sleeping too much -   Feeling tired or having little energy -   Poor appetite, weight loss, or overeating -   Feeling bad about yourself - or that you are a failure or have let yourself or your family down -   Trouble concentrating on things such as school, work, reading, or watching TV -   Moving or speaking so slowly that other people could have noticed; or the opposite being so fidgety that others notice -   Thoughts of being better off dead, or hurting yourself in some way -   PHQ 9 Score -   How difficult have these problems made it for you to do your work, take care of your home and get along with others -     No flowsheet data found. Abuse Screening Questionnaire 6/21/2018   Do you ever feel afraid of your partner? N   Are you in a relationship with someone who physically or mentally threatens you?  N   Is it safe for you to go home? Y

## 2022-03-18 PROBLEM — J40 BRONCHITIS DUE TO TOBACCO USE: Status: ACTIVE | Noted: 2017-11-01

## 2022-03-18 PROBLEM — Z72.0 BRONCHITIS DUE TO TOBACCO USE: Status: ACTIVE | Noted: 2017-11-01

## 2022-03-18 PROBLEM — R06.02 SOB (SHORTNESS OF BREATH) ON EXERTION: Status: ACTIVE | Noted: 2017-11-01

## 2022-03-19 PROBLEM — M54.2 CERVICAL PAIN (NECK): Status: ACTIVE | Noted: 2020-11-04

## 2022-03-19 PROBLEM — F14.20 COCAINE USE DISORDER, SEVERE, DEPENDENCE (HCC): Status: ACTIVE | Noted: 2020-11-04

## 2022-03-19 PROBLEM — K40.90 LEFT INGUINAL HERNIA: Status: ACTIVE | Noted: 2018-01-12

## 2022-03-19 PROBLEM — R91.8 ABNORMAL CT SCAN OF LUNG: Status: ACTIVE | Noted: 2017-11-01

## 2022-03-19 PROBLEM — N62 GYNECOMASTIA, MALE: Status: ACTIVE | Noted: 2018-05-23

## 2022-03-20 PROBLEM — F17.200 NICOTINE USE DISORDER: Status: ACTIVE | Noted: 2020-11-04

## 2022-04-02 ASSESSMENT — TONOMETRY
OS_IOP_MMHG: 15
OD_IOP_MMHG: 16

## 2022-04-02 ASSESSMENT — VISUAL ACUITY
OS_CC: 20/25-2
OD_CC: 20/25-2

## 2022-09-16 NOTE — PROGRESS NOTES
Pt is here for follow up on abdominal pain. 1. Have you been to the ER, urgent care clinic since your last visit? Hospitalized since your last visit? No    2. Have you seen or consulted any other health care providers outside of the [address] [place] [address] since your last visit? Include any pap smears or colon screening.  No No

## 2022-11-30 ENCOUNTER — APPOINTMENT (OUTPATIENT)
Dept: GENERAL RADIOLOGY | Age: 52
End: 2022-11-30
Attending: EMERGENCY MEDICINE
Payer: MEDICAID

## 2022-11-30 ENCOUNTER — HOSPITAL ENCOUNTER (EMERGENCY)
Age: 52
Discharge: LEFT BEFORE DISCHARGE | End: 2022-11-30
Attending: EMERGENCY MEDICINE
Payer: MEDICAID

## 2022-11-30 VITALS
HEIGHT: 72 IN | TEMPERATURE: 97.9 F | RESPIRATION RATE: 18 BRPM | DIASTOLIC BLOOD PRESSURE: 88 MMHG | BODY MASS INDEX: 25.06 KG/M2 | OXYGEN SATURATION: 99 % | WEIGHT: 185 LBS | HEART RATE: 100 BPM | SYSTOLIC BLOOD PRESSURE: 165 MMHG

## 2022-11-30 PROCEDURE — 73130 X-RAY EXAM OF HAND: CPT

## 2022-11-30 PROCEDURE — 75810000275 HC EMERGENCY DEPT VISIT NO LEVEL OF CARE

## 2022-11-30 NOTE — ED TRIAGE NOTES
Patient A/O x 4, presented to the ED with complaint injury to right hand after falling. Swelling noted to right 3rd and fourth digit of hand. Patient able to move fingers, pain on palpation.

## 2023-05-03 ENCOUNTER — HOSPITAL ENCOUNTER (EMERGENCY)
Facility: HOSPITAL | Age: 53
Discharge: HOME | End: 2023-05-03
Attending: EMERGENCY MEDICINE
Payer: MEDICAID

## 2023-05-03 ENCOUNTER — APPOINTMENT (OUTPATIENT)
Facility: HOSPITAL | Age: 53
End: 2023-05-03
Payer: MEDICAID

## 2023-05-03 VITALS
HEIGHT: 73 IN | RESPIRATION RATE: 18 BRPM | BODY MASS INDEX: 24.52 KG/M2 | DIASTOLIC BLOOD PRESSURE: 67 MMHG | OXYGEN SATURATION: 100 % | TEMPERATURE: 98 F | WEIGHT: 185 LBS | HEART RATE: 73 BPM | SYSTOLIC BLOOD PRESSURE: 125 MMHG
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
## 2023-05-03 DIAGNOSIS — S09.22XA TYMPANIC MEMBRANE RUPTURE, TRAUMATIC, LEFT, INITIAL ENCOUNTER: Primary | ICD-10-CM

## 2023-05-03 PROCEDURE — 70486 CT MAXILLOFACIAL W/O DYE: CPT

## 2023-05-03 PROCEDURE — 99284 EMERGENCY DEPT VISIT MOD MDM: CPT

## 2023-05-03 PROCEDURE — 72125 CT NECK SPINE W/O DYE: CPT

## 2023-05-03 PROCEDURE — 70450 CT HEAD/BRAIN W/O DYE: CPT

## 2023-05-03 RX ORDER — OFLOXACIN 3 MG/ML
5 SOLUTION AURICULAR (OTIC) 2 TIMES DAILY
Qty: 5 ML | Refills: 0 | Status: SHIPPED | OUTPATIENT
Start: 2023-05-03 | End: 2023-05-13

## 2023-05-03 ASSESSMENT — PAIN - FUNCTIONAL ASSESSMENT: PAIN_FUNCTIONAL_ASSESSMENT: 0-10

## 2023-05-03 ASSESSMENT — ENCOUNTER SYMPTOMS
VOMITING: 0
SHORTNESS OF BREATH: 0
COUGH: 0
NAUSEA: 0

## 2023-05-03 ASSESSMENT — PAIN SCALES - GENERAL: PAINLEVEL_OUTOF10: 10

## 2023-05-03 ASSESSMENT — PAIN DESCRIPTION - LOCATION: LOCATION: HEAD
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
## 2025-05-10 ENCOUNTER — HOSPITAL ENCOUNTER (EMERGENCY)
Age: 55
Discharge: ANOTHER HOSPITAL | End: 2025-05-10
Attending: EMERGENCY MEDICINE
Payer: MEDICAID

## 2025-05-10 ENCOUNTER — APPOINTMENT (OUTPATIENT)
Age: 55
End: 2025-05-10
Payer: MEDICAID

## 2025-05-10 VITALS
WEIGHT: 280 LBS | HEART RATE: 89 BPM | TEMPERATURE: 97.5 F | BODY MASS INDEX: 37.93 KG/M2 | SYSTOLIC BLOOD PRESSURE: 142 MMHG | HEIGHT: 72 IN | OXYGEN SATURATION: 94 % | RESPIRATION RATE: 18 BRPM | DIASTOLIC BLOOD PRESSURE: 92 MMHG

## 2025-05-10 DIAGNOSIS — R22.0 FACIAL SWELLING: Primary | ICD-10-CM

## 2025-05-10 DIAGNOSIS — M27.2 ABSCESS OF MANDIBLE: ICD-10-CM

## 2025-05-10 LAB
ALBUMIN SERPL-MCNC: 3.8 G/DL (ref 3.4–5)
ALBUMIN/GLOB SERPL: 1 (ref 1–1.9)
ALP SERPL-CCNC: 137 U/L (ref 45–117)
ALT SERPL-CCNC: 22 U/L (ref 10–50)
ANION GAP SERPL CALC-SCNC: 15 MMOL/L (ref 7–16)
APPEARANCE UR: CLEAR
AST SERPL-CCNC: 17 U/L (ref 10–38)
BACTERIA URNS QL MICRO: NEGATIVE /HPF
BASOPHILS # BLD: 0.03 K/UL (ref 0–0.1)
BASOPHILS NFR BLD: 0.3 % (ref 0–2)
BILIRUB SERPL-MCNC: 1.3 MG/DL (ref 0.2–1)
BILIRUB UR QL: NEGATIVE
BUN SERPL-MCNC: 13 MG/DL (ref 6–23)
BUN/CREAT SERPL: 10
CALCIUM SERPL-MCNC: 9.3 MG/DL (ref 8.5–10.1)
CHLORIDE SERPL-SCNC: 102 MMOL/L (ref 98–107)
CO2 SERPL-SCNC: 22 MMOL/L (ref 21–32)
COLOR UR: YELLOW
CREAT SERPL-MCNC: 1.34 MG/DL (ref 0.6–1.3)
DIFFERENTIAL METHOD BLD: ABNORMAL
EKG ATRIAL RATE: 93 BPM
EKG DIAGNOSIS: NORMAL
EKG P AXIS: 34 DEGREES
EKG P-R INTERVAL: 156 MS
EKG Q-T INTERVAL: 356 MS
EKG QRS DURATION: 82 MS
EKG QTC CALCULATION (BAZETT): 442 MS
EKG R AXIS: 4 DEGREES
EKG T AXIS: 3 DEGREES
EKG VENTRICULAR RATE: 93 BPM
EOSINOPHIL # BLD: 0.03 K/UL (ref 0–0.4)
EOSINOPHIL NFR BLD: 0.3 % (ref 0–5)
ERYTHROCYTE [DISTWIDTH] IN BLOOD BY AUTOMATED COUNT: 14.8 % (ref 11.6–14.5)
GLOBULIN SER CALC-MCNC: 3.7 G/DL (ref 2.3–3.5)
GLUCOSE BLD STRIP.AUTO-MCNC: 147 MG/DL (ref 70–110)
GLUCOSE SERPL-MCNC: 122 MG/DL (ref 74–108)
GLUCOSE UR STRIP.AUTO-MCNC: NEGATIVE MG/DL
HCT VFR BLD AUTO: 37.4 % (ref 36–48)
HGB BLD-MCNC: 13 G/DL (ref 13–16)
HGB UR QL STRIP: NEGATIVE
IMM GRANULOCYTES # BLD AUTO: 0.04 K/UL (ref 0–0.04)
IMM GRANULOCYTES NFR BLD AUTO: 0.3 % (ref 0–0.5)
KETONES UR QL STRIP.AUTO: NEGATIVE MG/DL
LACTATE BLD-SCNC: 1.11 MMOL/L (ref 0.4–2)
LEUKOCYTE ESTERASE UR QL STRIP.AUTO: NEGATIVE
LYMPHOCYTES # BLD: 0.44 K/UL (ref 0.9–3.6)
LYMPHOCYTES NFR BLD: 3.8 % (ref 21–52)
MCH RBC QN AUTO: 28.3 PG (ref 24–34)
MCHC RBC AUTO-ENTMCNC: 34.8 G/DL (ref 31–37)
MCV RBC AUTO: 81.5 FL (ref 78–100)
MONOCYTES # BLD: 0.56 K/UL (ref 0.05–1.2)
MONOCYTES NFR BLD: 4.8 % (ref 3–10)
NEUTS SEG # BLD: 10.55 K/UL (ref 1.8–8)
NEUTS SEG NFR BLD: 90.5 % (ref 40–73)
NITRITE UR QL STRIP.AUTO: NEGATIVE
NRBC # BLD: 0 K/UL (ref 0–0.01)
NRBC BLD-RTO: 0 PER 100 WBC
PH UR STRIP: 6 (ref 5–8)
PLATELET # BLD AUTO: 196 K/UL (ref 135–420)
PMV BLD AUTO: 10.3 FL (ref 9.2–11.8)
POTASSIUM SERPL-SCNC: 3.8 MMOL/L (ref 3.5–5.5)
PROT SERPL-MCNC: 7.5 G/DL (ref 6.4–8.2)
PROT UR STRIP-MCNC: ABNORMAL MG/DL
RBC # BLD AUTO: 4.59 M/UL (ref 4.35–5.65)
RBC #/AREA URNS HPF: NEGATIVE /HPF (ref 0–5)
SODIUM SERPL-SCNC: 139 MMOL/L (ref 136–145)
SP GR UR REFRACTOMETRY: >1.03 (ref 1–1.03)
TROPONIN T SERPL HS-MCNC: 17.5 NG/L (ref 0–22)
UROBILINOGEN UR QL STRIP.AUTO: 2 EU/DL (ref 0.2–1)
WBC # BLD AUTO: 11.7 K/UL (ref 4.6–13.2)
WBC URNS QL MICRO: NORMAL /HPF (ref 0–4)

## 2025-05-10 PROCEDURE — 87086 URINE CULTURE/COLONY COUNT: CPT

## 2025-05-10 PROCEDURE — 96374 THER/PROPH/DIAG INJ IV PUSH: CPT

## 2025-05-10 PROCEDURE — 84145 PROCALCITONIN (PCT): CPT

## 2025-05-10 PROCEDURE — 80053 COMPREHEN METABOLIC PANEL: CPT

## 2025-05-10 PROCEDURE — 81003 URINALYSIS AUTO W/O SCOPE: CPT

## 2025-05-10 PROCEDURE — 84484 ASSAY OF TROPONIN QUANT: CPT

## 2025-05-10 PROCEDURE — 70487 CT MAXILLOFACIAL W/DYE: CPT

## 2025-05-10 PROCEDURE — 96375 TX/PRO/DX INJ NEW DRUG ADDON: CPT

## 2025-05-10 PROCEDURE — 83605 ASSAY OF LACTIC ACID: CPT

## 2025-05-10 PROCEDURE — 99285 EMERGENCY DEPT VISIT HI MDM: CPT

## 2025-05-10 PROCEDURE — 6360000004 HC RX CONTRAST MEDICATION

## 2025-05-10 PROCEDURE — 81001 URINALYSIS AUTO W/SCOPE: CPT

## 2025-05-10 PROCEDURE — 85025 COMPLETE CBC W/AUTO DIFF WBC: CPT

## 2025-05-10 PROCEDURE — 6360000002 HC RX W HCPCS

## 2025-05-10 PROCEDURE — 87040 BLOOD CULTURE FOR BACTERIA: CPT

## 2025-05-10 PROCEDURE — 82962 GLUCOSE BLOOD TEST: CPT

## 2025-05-10 RX ORDER — KETOROLAC TROMETHAMINE 15 MG/ML
15 INJECTION, SOLUTION INTRAMUSCULAR; INTRAVENOUS
Status: COMPLETED | OUTPATIENT
Start: 2025-05-10 | End: 2025-05-10

## 2025-05-10 RX ORDER — KETOROLAC TROMETHAMINE 15 MG/ML
15 INJECTION, SOLUTION INTRAMUSCULAR; INTRAVENOUS
Status: DISCONTINUED | OUTPATIENT
Start: 2025-05-10 | End: 2025-05-10

## 2025-05-10 RX ORDER — MORPHINE SULFATE 4 MG/ML
4 INJECTION, SOLUTION INTRAMUSCULAR; INTRAVENOUS
Status: COMPLETED | OUTPATIENT
Start: 2025-05-10 | End: 2025-05-10

## 2025-05-10 RX ORDER — CLINDAMYCIN PHOSPHATE 600 MG/50ML
600 INJECTION, SOLUTION INTRAVENOUS
Status: COMPLETED | OUTPATIENT
Start: 2025-05-10 | End: 2025-05-10

## 2025-05-10 RX ORDER — IOPAMIDOL 612 MG/ML
100 INJECTION, SOLUTION INTRAVASCULAR
Status: COMPLETED | OUTPATIENT
Start: 2025-05-10 | End: 2025-05-10

## 2025-05-10 RX ORDER — DEXAMETHASONE SODIUM PHOSPHATE 4 MG/ML
10 INJECTION, SOLUTION INTRA-ARTICULAR; INTRALESIONAL; INTRAMUSCULAR; INTRAVENOUS; SOFT TISSUE
Status: COMPLETED | OUTPATIENT
Start: 2025-05-10 | End: 2025-05-10

## 2025-05-10 RX ADMIN — IOPAMIDOL 100 ML: 612 INJECTION, SOLUTION INTRAVENOUS at 16:07

## 2025-05-10 RX ADMIN — CLINDAMYCIN PHOSPHATE 600 MG: 600 INJECTION, SOLUTION INTRAVENOUS at 15:42

## 2025-05-10 RX ADMIN — DEXAMETHASONE SODIUM PHOSPHATE 10 MG: 4 INJECTION, SOLUTION INTRAMUSCULAR; INTRAVENOUS at 15:27

## 2025-05-10 RX ADMIN — MORPHINE SULFATE 4 MG: 4 INJECTION, SOLUTION INTRAMUSCULAR; INTRAVENOUS at 16:33

## 2025-05-10 RX ADMIN — KETOROLAC TROMETHAMINE 15 MG: 15 INJECTION, SOLUTION INTRAMUSCULAR; INTRAVENOUS at 15:22

## 2025-05-10 ASSESSMENT — PAIN DESCRIPTION - LOCATION
LOCATION: FACE
LOCATION: FACE

## 2025-05-10 ASSESSMENT — PAIN DESCRIPTION - DESCRIPTORS
DESCRIPTORS: PRESSURE
DESCRIPTORS: ACHING;PRESSURE

## 2025-05-10 ASSESSMENT — PAIN SCALES - GENERAL
PAINLEVEL_OUTOF10: 8
PAINLEVEL_OUTOF10: 10
PAINLEVEL_OUTOF10: 3

## 2025-05-10 ASSESSMENT — PAIN - FUNCTIONAL ASSESSMENT: PAIN_FUNCTIONAL_ASSESSMENT: 0-10

## 2025-05-10 NOTE — ED PROVIDER NOTES
EMERGENCY DEPARTMENT HISTORY AND PHYSICAL EXAM      Patient Name: Joe Solorio  MRN: [id number]  YOB: 1970  Provider: Iraida Del Rio PA-C  PCP: Luna Barnes MD   Time/Date of evaluation: 3:17 PM EDT on 5/10/25    History of Presenting Illness     Chief Complaint   Patient presents with    Dental Pain    Facial Swelling       History Provided By: Patient     History (Narative):   Joe Solorio is a 55 y.o. male with a PMHX of of bipolar disorder, GERD, polysubstance abuse,  who presents to the emergency department  by POV C/O right dental pain ongoing for the past week.  And has progressive worsening of the dental pain/facial swelling.  Patient endorses that the swelling has gotten progressively worse over the past day.  And endorses dysphagia, and drooling.  He has a history of an impacted wisdom tooth and has not been to the dentist in some time.  Denies any fever, chills, difficulty breathing    Past History     Past Medical History:  Past Medical History:   Diagnosis Date    Acute psychosis (HCC)     Bipolar disorder (HCC)     GERD (gastroesophageal reflux disease)     Polysubstance abuse (HCC)     Schizophrenia (HCC)     Suicidal behavior        Past Surgical History:  History reviewed. No pertinent surgical history.    Family History:  Family History   Problem Relation Age of Onset    Diabetes Mother     Heart Disease Father     Prostate Cancer Father     Hypertension Father        Social History:  Social History     Tobacco Use    Smoking status: Every Day     Current packs/day: 1.00     Types: Cigarettes    Smokeless tobacco: Never   Substance Use Topics    Alcohol use: Yes    Drug use: Not Currently     Types: Cocaine       Medications:  No current facility-administered medications for this encounter.     Current Outpatient Medications   Medication Sig Dispense Refill    ARIPiprazole (ABILIFY) 20 MG tablet Take 1 tablet by mouth daily      benztropine (COGENTIN) 0.5 MG tablet  coverage at this time recommended transfer to [facility].  Spoke with patient and he is okay with going to [facility] [CD]   3083 Spoke with Dr. Hughes, who will accept patient for transfer to [facility] for [facility]MS services.   [CD]   1759 Spoke with ED attending, Dr. RAZO  [CD]   1322 Reevaluated patient he is doing well at this time, airway still open and patent he denies any difficulty managing secretions or difficulty breathing.  Pain is under control.  Patient is watching TV comfortable [CD]      ED Course User Index  [CD] Iraida Sandoval, KO       Medical Decision Making   DDX: Dental caries, ludwigs, mandibular abscess, Parotid infection/inflammation.   55-year-old male with history of bipolar disorder, GERD, presented ambulatory to the ED with 1 day of right-sided facial swelling that is worsening over the past day.  On physical exam he was nontoxic, no acute distress, did have significant right-sided facial swelling that extended past the jaw, associated with trismus, patient was managing secretions and airway without any difficulty.  CT of the maxillofacial was significant for 2 point centimeter abscess and an additional 1 cm abscess all associated with the right mandible and likely coming from the right lower molar.  [facility] was consulted at [facility], ED to ED transfer and was initiated. Dr. Roman, supervising attending evaluated patient, and agrees with plan and  dispo .        8:27 PM :Pt care assumed Dr. Smith , ED provider. Pt complaint(s), current treatment plan, progression and available diagnostic results have been discussed thoroughly. The patient was seen and evaluated on my shift.   Rounding occurred: no   Intended Disposition: transfer   Pending diagnostic reports and/or labs (please list): transport   Disposition Considerations : transfer       Critical Care Time:   Critical Care Time:  The services I provided to this patient were to treat and/or prevent clinically significant deterioration that could

## 2025-05-10 NOTE — ED NOTES
Assumed care of patient at this time. Patient sitting comfortably on bed, no signs of acute distress. Remains on cardiac, BP and pulse oc monitors. Awaiting transfer to [facility]. Denies any needs at this time.

## 2025-05-10 NOTE — ED TRIAGE NOTES
Right facial swelling related to wisdom tooth issues. Patient states face started swelling on Friday.

## 2025-05-10 NOTE — ED NOTES
17:50  [facility]  CALLING WITH  DR SANCHEZ FROM [facility]     18:50  ALENA FROM [facility]  ETA  [facility] 23:30  TO [facility] ED   DR CITLALLI HEAD   ACCEPTING     18:56 [facility] CALLED SPOKE WITH (WILL)  PUSHING ETA BACK TO 12:30

## 2025-05-11 NOTE — ED NOTES
Patient transported by [facility] to [facility]. EMTALA and medical records given to transport personnel. Patient has all his belongings.

## 2025-05-12 LAB
BACTERIA SPEC CULT: NORMAL
SERVICE CMNT-IMP: NORMAL

## 2025-05-15 LAB
BACTERIA SPEC CULT: NORMAL
BACTERIA SPEC CULT: NORMAL
PROCALCITONIN SERPL-MCNC: 13.5 NG/ML
SERVICE CMNT-IMP: NORMAL
SERVICE CMNT-IMP: NORMAL

## 2025-06-16 ENCOUNTER — TRANSCRIBE ORDERS (OUTPATIENT)
Facility: HOSPITAL | Age: 55
End: 2025-06-16

## 2025-06-16 DIAGNOSIS — N28.1 RENAL CYST, ACQUIRED, LEFT: Primary | ICD-10-CM

## 2025-06-25 ENCOUNTER — HOSPITAL ENCOUNTER (OUTPATIENT)
Facility: HOSPITAL | Age: 55
Discharge: HOME | End: 2025-06-28
Payer: MEDICAID

## 2025-06-25 DIAGNOSIS — N28.1 RENAL CYST, ACQUIRED, LEFT: ICD-10-CM

## 2025-06-25 PROCEDURE — 76770 US EXAM ABDO BACK WALL COMP: CPT
